# Patient Record
Sex: MALE | Race: WHITE | NOT HISPANIC OR LATINO | Employment: OTHER | URBAN - METROPOLITAN AREA
[De-identification: names, ages, dates, MRNs, and addresses within clinical notes are randomized per-mention and may not be internally consistent; named-entity substitution may affect disease eponyms.]

---

## 2017-01-04 ENCOUNTER — APPOINTMENT (OUTPATIENT)
Dept: PREADMISSION TESTING | Facility: HOSPITAL | Age: 77
End: 2017-01-04

## 2017-01-04 DIAGNOSIS — R00.1 BRADYCARDIA: ICD-10-CM

## 2017-01-04 LAB
ANION GAP SERPL CALCULATED.3IONS-SCNC: 10 MMOL/L (ref 3–11)
BASOPHILS # BLD AUTO: 0.02 10*3/MM3 (ref 0–0.2)
BASOPHILS NFR BLD AUTO: 0.3 % (ref 0–1)
BUN BLD-MCNC: 19 MG/DL (ref 9–23)
BUN/CREAT SERPL: 11.9 (ref 7–25)
CALCIUM SPEC-SCNC: 11.2 MG/DL (ref 8.7–10.4)
CHLORIDE SERPL-SCNC: 102 MMOL/L (ref 99–109)
CO2 SERPL-SCNC: 26 MMOL/L (ref 20–31)
CREAT BLD-MCNC: 1.6 MG/DL (ref 0.6–1.3)
DEPRECATED RDW RBC AUTO: 42.3 FL (ref 37–54)
EOSINOPHIL # BLD AUTO: 0.21 10*3/MM3 (ref 0.1–0.3)
EOSINOPHIL NFR BLD AUTO: 2.9 % (ref 0–3)
ERYTHROCYTE [DISTWIDTH] IN BLOOD BY AUTOMATED COUNT: 13 % (ref 11.3–14.5)
GFR SERPL CREATININE-BSD FRML MDRD: 42 ML/MIN/1.73
GLUCOSE BLD-MCNC: 180 MG/DL (ref 70–100)
HCT VFR BLD AUTO: 41.6 % (ref 38.9–50.9)
HGB BLD-MCNC: 14.7 G/DL (ref 13.1–17.5)
IMM GRANULOCYTES # BLD: 0.02 10*3/MM3 (ref 0–0.03)
IMM GRANULOCYTES NFR BLD: 0.3 % (ref 0–0.6)
INR PPP: 1.04
LYMPHOCYTES # BLD AUTO: 1.58 10*3/MM3 (ref 0.6–4.8)
LYMPHOCYTES NFR BLD AUTO: 21.8 % (ref 24–44)
MCH RBC QN AUTO: 31.7 PG (ref 27–31)
MCHC RBC AUTO-ENTMCNC: 35.3 G/DL (ref 32–36)
MCV RBC AUTO: 89.8 FL (ref 80–99)
MONOCYTES # BLD AUTO: 0.5 10*3/MM3 (ref 0–1)
MONOCYTES NFR BLD AUTO: 6.9 % (ref 0–12)
NEUTROPHILS # BLD AUTO: 4.91 10*3/MM3 (ref 1.5–8.3)
NEUTROPHILS NFR BLD AUTO: 67.8 % (ref 41–71)
PLATELET # BLD AUTO: 277 10*3/MM3 (ref 150–450)
PMV BLD AUTO: 11 FL (ref 6–12)
POTASSIUM BLD-SCNC: 4 MMOL/L (ref 3.5–5.5)
PROTHROMBIN TIME: 11.3 SECONDS (ref 9.6–11.5)
RBC # BLD AUTO: 4.63 10*6/MM3 (ref 4.2–5.76)
SODIUM BLD-SCNC: 138 MMOL/L (ref 132–146)
WBC NRBC COR # BLD: 7.24 10*3/MM3 (ref 3.5–10.8)

## 2017-01-04 PROCEDURE — 80048 BASIC METABOLIC PNL TOTAL CA: CPT | Performed by: PHYSICIAN ASSISTANT

## 2017-01-04 PROCEDURE — 85610 PROTHROMBIN TIME: CPT | Performed by: PHYSICIAN ASSISTANT

## 2017-01-04 PROCEDURE — 85025 COMPLETE CBC W/AUTO DIFF WBC: CPT | Performed by: PHYSICIAN ASSISTANT

## 2017-01-04 PROCEDURE — 36415 COLL VENOUS BLD VENIPUNCTURE: CPT

## 2017-01-04 RX ORDER — NITROGLYCERIN 0.4 MG/1
0.4 TABLET SUBLINGUAL
COMMUNITY
End: 2021-06-23 | Stop reason: SDUPTHER

## 2017-01-04 RX ORDER — MECLIZINE HCL 25MG 25 MG/1
25 TABLET, CHEWABLE ORAL 3 TIMES DAILY PRN
COMMUNITY
End: 2017-10-11

## 2017-01-04 NOTE — DISCHARGE INSTRUCTIONS
The following instructions given during Pre Admission Testing visit:    Do not eat or drink anything after MN except for sips of water with your a.m. Prescription meds unless otherwise instructed by your physician.    Glasses and jewelry may be worn, but dentures must be removed prior to cath/procedure.    Leave any items you consider valuable at home.    Family members may wait in [facility] waiting area during procedure.    Bring all medications in their original containers the day of procedure.    Bring photo ID and insurance cards on the day of procedure.    Need to make arrangements for transportation prior to discharge.    The following handouts were given:     Heart Cath pathway (if applicable)   Cardiac Cath booklet published by [facility]    OR appropriate [facility] procedure booklet    If applicable, pt instructed to bring CPAP mask and tubing the day of procedure. WIPES GIVEN.

## 2017-01-05 PROBLEM — R00.1 SYMPTOMATIC SINUS BRADYCARDIA: Status: ACTIVE | Noted: 2017-01-05

## 2017-01-05 PROBLEM — I45.89 CHRONOTROPIC INCOMPETENCE: Status: ACTIVE | Noted: 2017-01-05

## 2017-01-06 ENCOUNTER — APPOINTMENT (OUTPATIENT)
Dept: GENERAL RADIOLOGY | Facility: HOSPITAL | Age: 77
End: 2017-01-06

## 2017-01-06 PROCEDURE — 71020 HC CHEST PA AND LATERAL: CPT

## 2017-01-16 ENCOUNTER — OFFICE VISIT (OUTPATIENT)
Dept: CARDIOLOGY | Facility: CLINIC | Age: 77
End: 2017-01-16

## 2017-01-16 DIAGNOSIS — R00.1 SYMPTOMATIC BRADYCARDIA: Primary | ICD-10-CM

## 2017-01-16 PROCEDURE — 99024 POSTOP FOLLOW-UP VISIT: CPT | Performed by: INTERNAL MEDICINE

## 2017-01-16 NOTE — PROGRESS NOTES
WOUND CHECK    2017    Javier Lerner, : 1940    WOUND CHECK    B/P:  (Sitting)   164/82  LEFT ARM  (Standing)     Pulse: 79    Patient has fever: [] Temperature if indicated: 97.4    Wound Location: LEFT INFRACLAVICULAR    Dressing Removed [x]        Old Dressing Appearance:  Clean, dry []                 Old, bloody drainage [x]   MINIMAL                          Moist, serous drainage []                Moist, thick yellow/green drainage []       Wound Appearance: Redness []                  Drainage []                  Culture obtained []        Color: N/A     Consistency: N/A     Amount: none         Gloves used, wound cleansed with sterile 4x4 and peroxide [x]       MD notified [] MD orders:   Antibiotic started []  If checked, type   Other:     Appointment for follow-up scheduled for 3 months post procedure [x]    Future Appointments  Date Time Provider Department Center   2017 1:00 PM MD AMANUEL Rodriguez [facility] None   2017 11:45 AM MD AMANUEL Tamayo [facility] None           Afshan Walden MA, 17      MD Signature:______________________________ Completed By/Date:

## 2017-01-25 ENCOUNTER — OFFICE VISIT (OUTPATIENT)
Dept: CARDIOLOGY | Facility: CLINIC | Age: 77
End: 2017-01-25

## 2017-01-25 VITALS
BODY MASS INDEX: 37.1 KG/M2 | WEIGHT: 265 LBS | DIASTOLIC BLOOD PRESSURE: 68 MMHG | SYSTOLIC BLOOD PRESSURE: 120 MMHG | HEART RATE: 75 BPM | HEIGHT: 71 IN

## 2017-01-25 DIAGNOSIS — E11.9 TYPE 2 DIABETES MELLITUS WITHOUT COMPLICATION, WITH LONG-TERM CURRENT USE OF INSULIN (HCC): ICD-10-CM

## 2017-01-25 DIAGNOSIS — I25.10 CORONARY ARTERY DISEASE INVOLVING NATIVE CORONARY ARTERY OF NATIVE HEART WITHOUT ANGINA PECTORIS: Primary | ICD-10-CM

## 2017-01-25 DIAGNOSIS — Z79.4 TYPE 2 DIABETES MELLITUS WITHOUT COMPLICATION, WITH LONG-TERM CURRENT USE OF INSULIN (HCC): ICD-10-CM

## 2017-01-25 DIAGNOSIS — E78.2 MIXED HYPERLIPIDEMIA: ICD-10-CM

## 2017-01-25 PROCEDURE — 99213 OFFICE O/P EST LOW 20 MIN: CPT | Performed by: INTERNAL MEDICINE

## 2017-01-25 NOTE — PROGRESS NOTES
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    Chief Complaint   Patient presents with   • Follow-up     HTN       PROBLEM LIST:  1. Symptomatic bradycardia  A. Carolyn: holter avg rate mid 40's and planned PPM in January  B. Echo 10/16: LVEF >70, Anatomically and functionally normal valves  C. MPS 10/16: normal perfusion with no evidence of ischemia, EF 67%.  D. BTK ppm 1/17- abh lower rate 70  2. Hypertension  3. Chronic fatigue  4. CKD, stage 3, GFR 30-59 1/17 creat 1.7  5. Obesity  6. DMT2  7. OMAIRA with CPAP  8. Tobacco use - cessation 3 years ago       Allergies  Allergies   Allergen Reactions   • Penicillins Other (See Comments)     WELTS ON HANDS AND TURN PURPLE   • Tetracyclines & Related      Blisters on skin       Current Medications    Current Outpatient Prescriptions:   •  amLODIPine (NORVASC) 10 MG tablet, Take 10 mg by mouth Every Morning., Disp: , Rfl:   •  aspirin 81 MG EC tablet, Take 81 mg by mouth Every Night., Disp: , Rfl:   •  doxazosin (CARDURA) 4 MG tablet, Take 4 mg by mouth Every Night., Disp: , Rfl:   •  fenofibrate 160 MG tablet, Take 160 mg by mouth Every Morning., Disp: , Rfl:   •  Insulin Glargine (TOUJEO SOLOSTAR) 300 UNIT/ML solution pen-injector, Inject 55 Units under the skin 2 (Two) Times a Day., Disp: , Rfl:   •  JANUVIA 50 MG tablet, Take 50 mg by mouth Every Morning., Disp: , Rfl:   •  lisinopril (PRINIVIL,ZESTRIL) 30 MG tablet, Take 30 mg by mouth every night., Disp: , Rfl:   •  lisinopril-hydrochlorothiazide (PRINZIDE,ZESTORETIC) 20-12.5 MG per tablet, Take 1 tablet by mouth every morning., Disp: , Rfl:   •  meclizine 25 MG chewable tablet chewable tablet, Chew 25 mg 3 (Three) Times a Day As Needed., Disp: , Rfl:   •  metFORMIN  "(GLUCOPHAGE) 1000 MG tablet, Take 1,000 mg by mouth 2 (Two) Times a Day With Meals., Disp: , Rfl:   •  nitroglycerin (NITROSTAT) 0.4 MG SL tablet, Place 0.4 mg under the tongue Every 5 (Five) Minutes As Needed for chest pain. Take no more than 3 doses in 15 minutes., Disp: , Rfl:   •  simvastatin (ZOCOR) 20 MG tablet, Take 20 mg by mouth Every Night., Disp: , Rfl:       History of Present Illness     Pt denies any chest pain, dyspnea, dyspnea on exertion, orthopnea, PND, palpitations, lower extremity edema.    ROS:  All systems have been reviewed and are negative with the exception of those mentioned in the HPI.    OBJECTIVE:  Vitals:    01/25/17 1316   BP: 120/68   BP Location: Left arm   Patient Position: Sitting   Pulse: 75   Weight: 265 lb (120 kg)   Height: 71\" (180.3 cm)     Physical Exam   Constitutional: He appears well-developed and well-nourished.   Neck: Normal range of motion. Neck supple. No hepatojugular reflux and no JVD present. Carotid bruit is not present. No tracheal deviation present. No thyromegaly present.   Cardiovascular: Normal rate, regular rhythm, S1 normal, S2 normal, intact distal pulses and normal pulses.  PMI is not displaced.  Exam reveals no gallop, no distant heart sounds, no friction rub, no midsystolic click and no opening snap.    No murmur heard.  Pulses:       Radial pulses are 2+ on the right side, and 2+ on the left side.        Dorsalis pedis pulses are 2+ on the right side, and 2+ on the left side.        Posterior tibial pulses are 2+ on the right side, and 2+ on the left side.   Pulmonary/Chest: Effort normal and breath sounds normal. He has no wheezes. He has no rales.   Abdominal: Soft. Bowel sounds are normal. He exhibits no mass. There is no tenderness. There is no guarding.       Diagnostic Data:  Procedures      ASSESSMENT:   Diagnosis Plan   1. Coronary artery disease involving native coronary artery of native heart without angina pectoris     2. Type 2 diabetes " mellitus without complication, with long-term current use of insulin     3. Mixed hyperlipidemia         PLAN:  PLAN:  1. CAD No anginal equivalent and just had a normal stress test 2 months ago. Continue to monitor and risk factor reduction with weight loss with carbohydrate restriction and continued therapy with statin and aspirin.  2. Mildly Elevated pressures on home recordings, however, appropriately controlled pressures in office today. Continue to monitor   3. Hl on statin  4. DM poor dietary compliance  5. OMAIRA on niv    Shala Chaparro MD, thank you for referring Mr. Lerner for evaluation.  I have forwarded my electronically generated recommendations to you for review.  Please do not hesitate to call with any questions.      Prashant King MD, FACC

## 2017-05-16 ENCOUNTER — OFFICE VISIT (OUTPATIENT)
Dept: CARDIOLOGY | Facility: CLINIC | Age: 77
End: 2017-05-16

## 2017-05-16 VITALS
WEIGHT: 259.2 LBS | HEART RATE: 72 BPM | HEIGHT: 71 IN | SYSTOLIC BLOOD PRESSURE: 132 MMHG | BODY MASS INDEX: 36.29 KG/M2 | DIASTOLIC BLOOD PRESSURE: 60 MMHG

## 2017-05-16 DIAGNOSIS — Z95.0 PACEMAKER: ICD-10-CM

## 2017-05-16 PROCEDURE — 93280 PM DEVICE PROGR EVAL DUAL: CPT | Performed by: INTERNAL MEDICINE

## 2017-10-11 ENCOUNTER — OFFICE VISIT (OUTPATIENT)
Dept: SLEEP MEDICINE | Facility: HOSPITAL | Age: 77
End: 2017-10-11

## 2017-10-11 VITALS
HEIGHT: 71 IN | BODY MASS INDEX: 36.68 KG/M2 | DIASTOLIC BLOOD PRESSURE: 70 MMHG | SYSTOLIC BLOOD PRESSURE: 146 MMHG | OXYGEN SATURATION: 97 % | HEART RATE: 70 BPM | WEIGHT: 262 LBS

## 2017-10-11 DIAGNOSIS — G47.33 OBSTRUCTIVE SLEEP APNEA, ADULT: Primary | ICD-10-CM

## 2017-10-11 PROCEDURE — 99213 OFFICE O/P EST LOW 20 MIN: CPT | Performed by: INTERNAL MEDICINE

## 2017-10-11 NOTE — PATIENT INSTRUCTIONS
Sleep Apnea  Sleep apnea is a condition in which breathing pauses or becomes shallow during sleep. Episodes of sleep apnea usually last 10 seconds or longer, and they may occur as many as 20 times an hour. Sleep apnea disrupts your sleep and keeps your body from getting the rest that it needs. This condition can increase your risk of certain health problems, including:  · Heart attack.  · Stroke.  · Obesity.  · Diabetes.  · Heart failure.  · Irregular heartbeat.  There are three kinds of sleep apnea:  · Obstructive sleep apnea. This kind is caused by a blocked or collapsed airway.  · Central sleep apnea. This kind happens when the part of the brain that controls breathing does not send the correct signals to the muscles that control breathing.  · Mixed sleep apnea. This is a combination of obstructive and central sleep apnea.  CAUSES  The most common cause of this condition is a collapsed or blocked airway. An airway can collapse or become blocked if:  · Your throat muscles are abnormally relaxed.  · Your tongue and tonsils are larger than normal.  · You are overweight.  · Your airway is smaller than normal.  RISK FACTORS  This condition is more likely to develop in people who:  · Are overweight.  · Smoke.  · Have a smaller than normal airway.  · Are elderly.  · Are male.  · Drink alcohol.  · Take sedatives or tranquilizers.  · Have a family history of sleep apnea.  SYMPTOMS  Symptoms of this condition include:  · Trouble staying asleep.  · Daytime sleepiness and tiredness.  · Irritability.  · Loud snoring.  · Morning headaches.  · Trouble concentrating.  · Forgetfulness.  · Decreased interest in sex.  · Unexplained sleepiness.  · Mood swings.  · Personality changes.  · Feelings of depression.  · Waking up often during the night to urinate.  · Dry mouth.  · Sore throat.  DIAGNOSIS  This condition may be diagnosed with:  · A medical history.  · A physical exam.  · A series of tests that are done while you are  sleeping (sleep study). These tests are usually done in a sleep lab, but they may also be done at home.  TREATMENT  Treatment for this condition aims to restore normal breathing and to ease symptoms during sleep. It may involve managing health issues that can affect breathing, such as high blood pressure or obesity. Treatment may include:  · Sleeping on your side.  · Using a decongestant if you have nasal congestion.  · Avoiding the use of depressants, including alcohol, sedatives, and narcotics.  · Losing weight if you are overweight.  · Making changes to your diet.  · Quitting smoking.  · Using a device to open your airway while you sleep, such as:    An oral appliance. This is a custom-made mouthpiece that shifts your lower jaw forward.    A continuous positive airway pressure (CPAP) device. This device delivers oxygen to your airway through a mask.    A nasal expiratory positive airway pressure (EPAP) device. This device has valves that you put into each nostril.    A bi-level positive airway pressure (BPAP) device. This device delivers oxygen to your airway through a mask.  · Surgery if other treatments do not work. During surgery, excess tissue is removed to create a wider airway.  It is important to get treatment for sleep apnea. Without treatment, this condition can lead to:  · High blood pressure.  · Coronary artery disease.  · (Men) An inability to achieve or maintain an erection (impotence).  · Reduced thinking abilities.  HOME CARE INSTRUCTIONS  · Make any lifestyle changes that your health care provider recommends.  · Eat a healthy, well-balanced diet.  · Take over-the-counter and prescription medicines only as told by your health care provider.  · Avoid using depressants, including alcohol, sedatives, and narcotics.  · Take steps to lose weight if you are overweight.  · If you were given a device to open your airway while you sleep, use it only as told by your health care provider.  · Do not use any  tobacco products, such as cigarettes, chewing tobacco, and e-cigarettes. If you need help quitting, ask your health care provider.  · Keep all follow-up visits as told by your health care provider. This is important.  SEEK MEDICAL CARE IF:  · The device that you received to open your airway during sleep is uncomfortable or does not seem to be working.  · Your symptoms do not improve.  · Your symptoms get worse.  SEEK IMMEDIATE MEDICAL CARE IF:  · You develop chest pain.  · You develop shortness of breath.  · You develop discomfort in your back, arms, or stomach.  · You have trouble speaking.  · You have weakness on one side of your body.  · You have drooping in your face.  These symptoms may represent a serious problem that is an emergency. Do not wait to see if the symptoms will go away. Get medical help right away. Call your local emergency services (911 in the [place] Do not drive yourself to the hospital.     This information is not intended to replace advice given to you by your health care provider. Make sure you discuss any questions you have with your health care provider.     Document Released: 12/08/2003 Document Revised: 04/10/2017 Document Reviewed: 09/26/2016  [employer] Interactive Patient Education ©2017 [employer]

## 2017-10-11 NOTE — PROGRESS NOTES
Subjective   Javier Lerner is a 76 y.o. male is here today for follow-up.  He is followed here with obstructive sleep apnea.  His primary care physician is Dr. Chaparro.    History of Present Illness  Patient was last seen September 20, 2016.  He has a history obstructive sleep apnea and is been on BiPAP.  He also has history of arthritis hypertension diabetes and obesity.  He denies any problems with his BiPAP machine or his mask.  He is feeling much better with his current mask.  He was having problems with bradycardia and got an pacemaker in January of this year.  He denies any other new complaints.  Past Medical History:   Diagnosis Date   • Arthritis    • Bradycardia    • Chronic fatigue 9/22/2016   • CKD (chronic kidney disease) stage 3, GFR 30-59 ml/min    • Diabetes mellitus     DX. 1997, FSBS 1 X DAY   • Enlarged prostate    • Heartburn    • Hyperlipidemia    • Hypertension    • Obesity, Class II, BMI 35-39.9, with comorbidity    • OMAIRA (obstructive sleep apnea)    • Pancreatitis     HISTORY OF   • Wears glasses        Past Surgical History:   Procedure Laterality Date   • ANKLE SURGERY Right    • APPENDECTOMY     • CARDIAC ELECTROPHYSIOLOGY PROCEDURE N/A 1/5/2017    Procedure: Pacemaker DC new;  Surgeon: Bryson Leon MD;  Location: [facility] INVASIVE LOCATION;  Service:    • CHOLECYSTECTOMY     • COLONOSCOPY     • HERNIA REPAIR      UMBILICAL    • PACEMAKER IMPLANTATION  01/05/2017    PER DR. LEON           Current Outpatient Prescriptions:   •  amLODIPine (NORVASC) 10 MG tablet, Take 10 mg by mouth Every Morning., Disp: , Rfl:   •  aspirin 81 MG EC tablet, Take 81 mg by mouth Every Night., Disp: , Rfl:   •  doxazosin (CARDURA) 4 MG tablet, Take 4 mg by mouth Every Night., Disp: , Rfl:   •  fenofibrate 160 MG tablet, Take 160 mg by mouth Every Morning., Disp: , Rfl:   •  Insulin Glargine (TOUJEO SOLOSTAR) 300 UNIT/ML solution pen-injector, Inject 55 Units under the skin 2 (Two) Times a Day.,  "Disp: , Rfl:   •  JANUVIA 50 MG tablet, Take 50 mg by mouth Every Morning., Disp: , Rfl:   •  lisinopril (PRINIVIL,ZESTRIL) 30 MG tablet, Take 30 mg by mouth every night., Disp: , Rfl:   •  lisinopril-hydrochlorothiazide (PRINZIDE,ZESTORETIC) 20-12.5 MG per tablet, Take 1 tablet by mouth every morning., Disp: , Rfl:   •  metFORMIN (GLUCOPHAGE) 1000 MG tablet, Take 1,000 mg by mouth 2 (Two) Times a Day With Meals., Disp: , Rfl:   •  simvastatin (ZOCOR) 20 MG tablet, Take 20 mg by mouth Every Night., Disp: , Rfl:   •  nitroglycerin (NITROSTAT) 0.4 MG SL tablet, Place 0.4 mg under the tongue Every 5 (Five) Minutes As Needed for chest pain. Take no more than 3 doses in 15 minutes., Disp: , Rfl:     Allergies   Allergen Reactions   • Penicillins Other (See Comments)     WELTS ON HANDS AND TURN PURPLE   • Tetracyclines & Related      Blisters on skin       The following portions of the patient's history were reviewed and updated as appropriate: allergies, current medications and problem list.    Review of Systems   Constitutional: Positive for diaphoresis.   HENT: Positive for congestion, postnasal drip and sinus pressure.    Eyes: Positive for visual disturbance.   Respiratory: Positive for shortness of breath.    Cardiovascular: Negative.    Gastrointestinal: Negative.    Endocrine: Positive for polydipsia and polyuria.   Genitourinary: Negative.    Musculoskeletal: Positive for arthralgias and joint swelling.   Skin: Negative.    Allergic/Immunologic: Positive for environmental allergies.   Neurological: Negative.    Psychiatric/Behavioral: The patient is nervous/anxious.    [place] scores 3/24    Objective     /70  Pulse 70  Ht 71\" (180.3 cm)  Wt 262 lb (119 kg)  SpO2 97%  BMI 36.54 kg/m2    Physical Exam   Constitutional: He is oriented to person, place, and time. He appears well-developed and well-nourished.   He is obese.   HENT:   Head: Normocephalic and atraumatic.   He has Mallampati class II anatomy. "   Eyes: EOM are normal. Pupils are equal, round, and reactive to light.   Neck: Normal range of motion. Neck supple.   Cardiovascular: Normal rate, regular rhythm and normal heart sounds.    Pulmonary/Chest: Effort normal and breath sounds normal.   Abdominal: Soft. Bowel sounds are normal.   Musculoskeletal: Normal range of motion. He exhibits no edema.   Neurological: He is alert and oriented to person, place, and time.   Skin: Skin is warm and dry.   Psychiatric: He has a normal mood and affect. His behavior is normal.    download from his machine shows excellent compliance.  Over the past 6 months he's used it every day.  He using 8 hours 11 minutes per day.  His AHI is normal at 0.6.  He has an inspiratory pressure of 15 expiratory pressure of 10      Assessment/Plan   Javier was seen today for follow-up.    Diagnoses and all orders for this visit:    Obstructive sleep apnea, adult  -     CPAP Therapy      Patient seems doing very well with his BiPAP machine.  He has good control of his respiratory events on his current pressures.  We will continue on these pressures.  We will renew his supplies.  We'll plan to see him back in 1 year.  He is encouraged to lose weight.  He is encouraged to avoid alcohol and sedatives close to bedtime.  He is encouraged practice lateral position sleep.  He is contact us earlier symptoms worsen.           Dash Tony MD [facility]  Sleep Medicine  Pulmonary and Critical Care Medicine      10/11/17  1:52 PM

## 2017-10-25 ENCOUNTER — OFFICE VISIT (OUTPATIENT)
Dept: CARDIOLOGY | Facility: CLINIC | Age: 77
End: 2017-10-25

## 2017-10-25 VITALS
DIASTOLIC BLOOD PRESSURE: 70 MMHG | HEART RATE: 70 BPM | WEIGHT: 265.2 LBS | BODY MASS INDEX: 37.13 KG/M2 | SYSTOLIC BLOOD PRESSURE: 146 MMHG | HEIGHT: 71 IN

## 2017-10-25 DIAGNOSIS — I25.10 CORONARY ARTERY DISEASE INVOLVING NATIVE CORONARY ARTERY OF NATIVE HEART WITHOUT ANGINA PECTORIS: Primary | ICD-10-CM

## 2017-10-25 DIAGNOSIS — Z79.4 TYPE 2 DIABETES MELLITUS WITH HYPERGLYCEMIA, WITH LONG-TERM CURRENT USE OF INSULIN (HCC): ICD-10-CM

## 2017-10-25 DIAGNOSIS — E78.2 MIXED HYPERLIPIDEMIA: ICD-10-CM

## 2017-10-25 DIAGNOSIS — E11.65 TYPE 2 DIABETES MELLITUS WITH HYPERGLYCEMIA, WITH LONG-TERM CURRENT USE OF INSULIN (HCC): ICD-10-CM

## 2017-10-25 DIAGNOSIS — I10 ESSENTIAL HYPERTENSION: ICD-10-CM

## 2017-10-25 PROCEDURE — 99214 OFFICE O/P EST MOD 30 MIN: CPT | Performed by: INTERNAL MEDICINE

## 2017-12-01 ENCOUNTER — OFFICE VISIT (OUTPATIENT)
Dept: CARDIOLOGY | Facility: CLINIC | Age: 77
End: 2017-12-01

## 2017-12-01 VITALS
BODY MASS INDEX: 36.68 KG/M2 | SYSTOLIC BLOOD PRESSURE: 158 MMHG | HEART RATE: 80 BPM | DIASTOLIC BLOOD PRESSURE: 64 MMHG | OXYGEN SATURATION: 97 % | HEIGHT: 71 IN | WEIGHT: 262 LBS

## 2017-12-01 DIAGNOSIS — R00.1 BRADYCARDIA: ICD-10-CM

## 2017-12-01 DIAGNOSIS — R00.1 SEVERE SINUS BRADYCARDIA: Primary | ICD-10-CM

## 2017-12-01 DIAGNOSIS — I45.89 CHRONOTROPIC INCOMPETENCE: ICD-10-CM

## 2017-12-01 PROCEDURE — 99214 OFFICE O/P EST MOD 30 MIN: CPT | Performed by: CLINICAL NURSE SPECIALIST

## 2017-12-01 PROCEDURE — 93288 INTERROG EVL PM/LDLS PM IP: CPT | Performed by: CLINICAL NURSE SPECIALIST

## 2017-12-01 NOTE — PROGRESS NOTES
[facility]  Electrophysiology Office Visit    Javier Lerner  1940  [phone]      12/01/2017    Location:    Shala Chaparro MD  [address] [place] [id number]    Chief Complaint/ Reason For Visit:   Chief Complaint   Patient presents with   • Slow Heart Rate       Problem List:  1. Symptomatic bradycardia   A. Carolyn: holter avg rate mid 40's and planned PPM in January   B. Echocardiogram 10/16: LVEF >70, Anatomically and functionally normal valves   C. MPS 10/16: normal perfusion with no evidence of ischemia, EF 67%.   D. BTK PPM 01/17- lower rate 70  2. Hypertension  3. Chronic fatigue  4. CKD, stage 3, GFR 30-59 1/17 creat 1.7  5. Obesity  6. DMT2  7. OMAIRA with CPAP  8. Tobacco use - cessation 3 years ago     Allergies  Allergies   Allergen Reactions   • Penicillins Other (See Comments)     WELTS ON HANDS AND TURN PURPLE   • Tetracyclines & Related      Blisters on skin       Current Medications    Current Outpatient Prescriptions:   •  amLODIPine (NORVASC) 10 MG tablet, Take 10 mg by mouth Every Morning., Disp: , Rfl:   •  aspirin 81 MG EC tablet, Take 81 mg by mouth Every Night., Disp: , Rfl:   •  doxazosin (CARDURA) 4 MG tablet, Take 4 mg by mouth Every Night., Disp: , Rfl:   •  fenofibrate 160 MG tablet, Take 160 mg by mouth Every Morning., Disp: , Rfl:   •  Insulin Glargine (TOUJEO SOLOSTAR) 300 UNIT/ML solution pen-injector, Inject  under the skin 2 (Two) Times a Day., Disp: , Rfl:   •  insulin lispro (humaLOG) 100 UNIT/ML injection, Inject  under the skin Daily., Disp: , Rfl:   •  lisinopril (PRINIVIL,ZESTRIL) 30 MG tablet, Take 30 mg by mouth every night., Disp: , Rfl:   •  lisinopril-hydrochlorothiazide (PRINZIDE,ZESTORETIC) 20-12.5 MG per tablet, Take 1 tablet by mouth every morning., Disp: , Rfl:   •  metFORMIN (GLUCOPHAGE) 1000 MG tablet, Take 1,000 mg by mouth 2 (Two) Times a Day With Meals., Disp: , Rfl:   •  nitroglycerin  "(NITROSTAT) 0.4 MG SL tablet, Place 0.4 mg under the tongue Every 5 (Five) Minutes As Needed for chest pain. Take no more than 3 doses in 15 minutes., Disp: , Rfl:   •  simvastatin (ZOCOR) 20 MG tablet, Take 20 mg by mouth Every Night., Disp: , Rfl:   •  SITagliptin (JANUVIA) 50 MG tablet, Take 50 mg by mouth Daily., Disp: , Rfl:     History of Present Illness   HPI    Patient presents for follow up of symptomatic sinus bradycardia s/p PPM implant.  Since the last visit, the patient denies any SOB, chest pain, near syncope or syncope. He has occasional palpitations, occasional dizziness and chronic fatigue. He has dizziness primarily upon 1st standing. Denies any hospitalizations, ER visits, bleeding, or TIA/CVA symptoms. Overall feels well.  He is looking forward to his upcoming one-[place] vacation to [place].    ROS:  General:  Positive for fatigue.  Denies weight gain or loss  Cardiovascular:  Positive for dizziness, palpitations.  Denies CP, PND, syncope, near syncope, edema.  Pulmonary:  Denies HERNÁNDEZ, cough, or wheezing  All other review of systems have been completed and are negative    Physical Exam:  Vitals:    12/01/17 1131   Weight: 262 lb (119 kg)   Height: 71\" (180.3 cm)     General: NAD  Neck: no JVD, no carotid bruits, no TM  Heart RRR, NL S1, S2, S4 present, no rubs, murmurs  Lungs: CTA, no wheezes, rhonchi, or rales  Abd: soft, non-tender, NL BS  Ext: No musculoskeletal deformities, 1+ bilateral lower extremity edema, no cyanosis or clubbing  Psych: normal mood and affect    Diagnostic Data:  Procedures  Device interrogation: Biotronik dual-chamber pacemaker with a lower rate interval of 70 bpm and upper tracking rate of 130 bpm.  Pacing in the right atrium is 92% in the right ventricle is 1%.  P waves are 2.9 mV and R waves are 9.0 mV.  Threshold in the right atrium is 1.2 V at 0.4 ms and in the right ventricle is 1.1 V at 0.4 ms.  Impedance in the right atrium is 448 ohms and the right ventricle " is 565 ohms.  Battery voltage is 90% which is an estimated 7 years and 7 months.  There are multiple high rate episodes since last visit including an episode of nonsustained ventricular tachycardia and multiple episodes of a regular atrial arrhythmia which primarily appears to be atrial tachycardia. Episodes last 8-26 seconds.  No changes made this interrogation.    1. Severe sinus bradycardia    2. Chronotropic incompetence    3. Bradycardia        Assessment:   1. Severe sinus bradycardia s/p Implantation of BTK DUAL-CHAMBER PPM January 2017: Device is functioning as programmed.  We're monitoring the patient remotely.  2. Atrial arrhythmia- EGM's mostly appear to demonstrate short bursts A Tach lasting 8-26 seconds.  Patient reports minimal palpitations.  3. HTN- systolic 168/ 78  4. DM II- follows with Endocrinology  5. CKD stage III- GFR 30- 59  6. OMAIRA with Bipap use  7. HLD- on Statin      Plan:   1. Continue current medical regimen. Consider addition of Metoprolol or switching Norvasc to Cardizem if tachycardia worsens. Patient is profoundly fatigued and has s/s of orthostasis, so I have not changed his regimen at this time given his short duration of tachycardia episodes and low burden.  2. ASA 81 mg daily  3. Follow up in 6 months with electrophysiology and Biotronik dual-chamber pacemaker device check    BOB Pedroza, MSN, ANP-C  Adult Nurse Practitioner  Cardiac Electrophysiology  12/1/2017  11:34 AM

## 2018-04-10 ENCOUNTER — CLINICAL SUPPORT NO REQUIREMENTS (OUTPATIENT)
Dept: CARDIOLOGY | Facility: CLINIC | Age: 78
End: 2018-04-10

## 2018-04-10 DIAGNOSIS — R00.1 BRADYCARDIA: ICD-10-CM

## 2018-04-10 PROCEDURE — 93294 REM INTERROG EVL PM/LDLS PM: CPT | Performed by: INTERNAL MEDICINE

## 2018-04-10 PROCEDURE — 93296 REM INTERROG EVL PM/IDS: CPT | Performed by: INTERNAL MEDICINE

## 2018-04-24 NOTE — PROGRESS NOTES
[facility] at [facility]  Office Progress Note  Javier Lerner  1940  [phone]      Visit Date: 4/25/2018     PCP: Shala Chaparro MD  [address] [facility] [address] [address]  [facility] [address]    IDENTIFICATION: A 77 y.o. 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  Motor homes to [facility]    Chief Complaint   Patient presents with   • Chest Pain       PROBLEM LIST:  1. Symptomatic bradycardia  A. Carolyn: holter avg rate mid 40's and planned PPM in January  B. Echo 10/16: LVEF >70, Anatomically and functionally normal valves  C. MPS 10/16: normal perfusion with no evidence of ischemia, EF 67%.  D. BTK ppm 1/17- abh lower rate 70  2. Hypertension  3. Chronic fatigue  4. CKD, stage 3, GFR 30-59 1/17 creat 1.7  5. Obesity  6. DMT2  A1c 9-7.2 2017  7. OMAIRA with CPAP  8. Tobacco use - cessation 3 years ago       Allergies  Allergies   Allergen Reactions   • Penicillins Other (See Comments)     WELTS ON HANDS AND TURN PURPLE   • Tetracyclines & Related      Blisters on skin       Current Medications    Current Outpatient Prescriptions:   •  amLODIPine (NORVASC) 10 MG tablet, Take 10 mg by mouth Every Morning., Disp: , Rfl:   •  aspirin 81 MG EC tablet, Take 81 mg by mouth Every Night., Disp: , Rfl:   •  doxazosin (CARDURA) 4 MG tablet, Take 4 mg by mouth Every Night., Disp: , Rfl:   •  fenofibrate 160 MG tablet, Take 160 mg by mouth Every Morning., Disp: , Rfl:   •  Insulin Glargine (TOUJEO SOLOSTAR) 300 UNIT/ML solution pen-injector, Inject  under the skin 2 (Two) Times a Day., Disp: , Rfl:   •  insulin lispro (humaLOG) 100 UNIT/ML injection, Inject 12 Units under the skin Daily., Disp: , Rfl:   •  linagliptin (TRADJENTA) 5 MG tablet tablet, Take 5 mg by mouth Daily., Disp: , Rfl:   •  lisinopril (PRINIVIL,ZESTRIL) 30 MG tablet, Take 30 mg by mouth every night., Disp: , Rfl:   •  lisinopril-hydrochlorothiazide (PRINZIDE,ZESTORETIC) 20-12.5 MG per tablet, Take 1 tablet by mouth  "every morning., Disp: , Rfl:   •  Magnesium 250 MG tablet, Take  by mouth., Disp: , Rfl:   •  metFORMIN (GLUCOPHAGE) 1000 MG tablet, Take 1,000 mg by mouth 2 (Two) Times a Day With Meals., Disp: , Rfl:   •  nitroglycerin (NITROSTAT) 0.4 MG SL tablet, Place 0.4 mg under the tongue Every 5 (Five) Minutes As Needed for chest pain. Take no more than 3 doses in 15 minutes., Disp: , Rfl:   •  simvastatin (ZOCOR) 20 MG tablet, Take 20 mg by mouth Every Night., Disp: , Rfl:       History of Present Illness     Pt denies n, dyspnea, dyspnea on exertion, orthopnea, PND, palpitations, milld lower extremity edema.  Patient w increased GERD and occasional chest fullness.    ROS:  All systems have been reviewed and are negative with the exception of those mentioned in the HPI.    OBJECTIVE:  Vitals:    04/25/18 1347   BP: 142/76   BP Location: Left arm   Patient Position: Sitting   Pulse: 76   Weight: 119 kg (262 lb)   Height: 180.3 cm (71\")     Physical Exam   Constitutional: He appears well-developed and well-nourished.   Neck: Normal range of motion. Neck supple. No hepatojugular reflux and no JVD present. Carotid bruit is not present. No tracheal deviation present. No thyromegaly present.   Cardiovascular: Normal rate, regular rhythm, S1 normal, S2 normal, intact distal pulses and normal pulses.  PMI is not displaced.  Exam reveals no gallop, no distant heart sounds, no friction rub, no midsystolic click and no opening snap.    No murmur heard.  Pulses:       Radial pulses are 2+ on the right side, and 2+ on the left side.        Dorsalis pedis pulses are 2+ on the right side, and 2+ on the left side.        Posterior tibial pulses are 2+ on the right side, and 2+ on the left side.   Pulmonary/Chest: Effort normal and breath sounds normal. He has no wheezes. He has no rales.   Abdominal: Soft. Bowel sounds are normal. He exhibits distension. He exhibits no mass. There is no tenderness. There is no guarding.   Musculoskeletal: " He exhibits edema.       Diagnostic Data:  Procedures      ASSESSMENT:  No diagnosis found.    PLAN:  1. CAD No anginal equivalent and just had a normal stress test 15 months ago. Continue to monitor and risk factor reduction with weight loss with carbohydrate restriction and continued therapy with statin and aspirin.  Low threshold for ischemic evaluation with any new symptoms  2. HTN Mildly Elevated pressures on home recordings Continue to monitor   3. Hl on statin  4. DM poor dietary compliance-escalation of RX recently  5. OMAIRA on niv  6.  AV block and sinus bradycardia  for his pacer to be checked at follow-up visits    Shala Chaparro MD, thank you for referring Mr. Lerner for evaluation.  I have forwarded my electronically generated recommendations to you for review.  Please do not hesitate to call with any questions.    Scribed for Prashant King MD by Yamilka Williamson PA-C. 4/25/2018  2:35 PM  I, Prashant King MD, personally performed the services described in this documentation as scribed by the above named individual in my presence, and it is both accurate and complete.  4/25/2018  2:37 PM    Prashant King MD, FACC

## 2018-04-25 ENCOUNTER — OFFICE VISIT (OUTPATIENT)
Dept: CARDIOLOGY | Facility: CLINIC | Age: 78
End: 2018-04-25

## 2018-04-25 VITALS
BODY MASS INDEX: 36.68 KG/M2 | DIASTOLIC BLOOD PRESSURE: 76 MMHG | WEIGHT: 262 LBS | SYSTOLIC BLOOD PRESSURE: 142 MMHG | HEART RATE: 76 BPM | HEIGHT: 71 IN

## 2018-04-25 DIAGNOSIS — I25.10 CORONARY ARTERY DISEASE INVOLVING NATIVE CORONARY ARTERY OF NATIVE HEART WITHOUT ANGINA PECTORIS: Primary | ICD-10-CM

## 2018-04-25 PROCEDURE — 99214 OFFICE O/P EST MOD 30 MIN: CPT | Performed by: INTERNAL MEDICINE

## 2018-04-25 RX ORDER — MULTIVITAMIN WITH IRON
1 TABLET ORAL NIGHTLY
COMMUNITY
End: 2020-09-14

## 2018-06-26 NOTE — PROGRESS NOTES
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 Symptomatic bradycardia  A. Carolyn: holter avg rate mid 40's and planned PPM in January  B. Echo 10/16: LVEF >70, Anatomically and functionally normal valves  C. MPS 10/16: normal perfusion with no evidence of ischemia, EF 67%.  D. BTK ppm 1/17- abh lower rate 70  2. Hypertension  3. Chronic fatigue  4. CKD, stage 3, GFR 30-59 1/17 creat 1.7  5. Obesity  6. DMT2  A1c 9-7.2 2017  7. OMAIRA with CPAP  8. Tobacco use - cessation 3 years ago       Allergies  Allergies   Allergen Reactions   • Penicillins Other (See Comments)     WELTS ON HANDS AND TURN PURPLE   • Tetracyclines & Related      Blisters on skin       Current Medications    Current Outpatient Prescriptions:   •  amLODIPine (NORVASC) 10 MG tablet, Take 10 mg by mouth Every Morning., Disp: , Rfl:   •  aspirin 81 MG EC tablet, Take 81 mg by mouth Every Night., Disp: , Rfl:   •  doxazosin (CARDURA) 4 MG tablet, Take 4 mg by mouth Every Night., Disp: , Rfl:   •  fenofibrate 160 MG tablet, Take 160 mg by mouth Every Morning., Disp: , Rfl:   •  Insulin Glargine (TOUJEO SOLOSTAR) 300 UNIT/ML solution pen-injector, Inject 55 Units under the skin 2 (Two) Times a Day., Disp: , Rfl:   •  JANUVIA 50 MG tablet, Take 50 mg by mouth Every Morning., Disp: , Rfl:   •  lisinopril (PRINIVIL,ZESTRIL) 30 MG tablet, Take 30 mg by mouth every night., Disp: , Rfl:   •  lisinopril-hydrochlorothiazide (PRINZIDE,ZESTORETIC) 20-12.5 MG per tablet, Take 1 tablet by mouth every morning., Disp: , Rfl:   •  metFORMIN  Note Text (......Xxx Chief Complaint.): This diagnosis correlates with the "(GLUCOPHAGE) 1000 MG tablet, Take 1,000 mg by mouth 2 (Two) Times a Day With Meals., Disp: , Rfl:   •  nitroglycerin (NITROSTAT) 0.4 MG SL tablet, Place 0.4 mg under the tongue Every 5 (Five) Minutes As Needed for chest pain. Take no more than 3 doses in 15 minutes., Disp: , Rfl:   •  simvastatin (ZOCOR) 20 MG tablet, Take 20 mg by mouth Every Night., Disp: , Rfl:       History of Present Illness     Pt denies any new  chest pain, dyspnea, dyspnea on exertion, orthopnea, PND, palpitations, milld lower extremity edema.  Patient is open to that he has had a poor diet on occasions.  He had a significant amount of watermelon intake through the summer and had A1c greater than 9.  He has cut back his starch intake and states his last A1c was in the 7 range.  He attest to no cardiac symptoms at current his residual lower extremity edema remains mild and he has occasional cramping interestingly correlates to when he takes his wife's omeprazole over-the-counter.    ROS:  All systems have been reviewed and are negative with the exception of those mentioned in the HPI.    OBJECTIVE:  Vitals:    10/25/17 1327   BP: 146/70   BP Location: Right arm   Patient Position: Sitting   Pulse: 70   Weight: 265 lb 3.2 oz (120 kg)   Height: 71\" (180.3 cm)     Physical Exam   Constitutional: He appears well-developed and well-nourished.   Neck: Normal range of motion. Neck supple. No hepatojugular reflux and no JVD present. Carotid bruit is not present. No tracheal deviation present. No thyromegaly present.   Cardiovascular: Normal rate, regular rhythm, S1 normal, S2 normal, intact distal pulses and normal pulses.  PMI is not displaced.  Exam reveals no gallop, no distant heart sounds, no friction rub, no midsystolic click and no opening snap.    No murmur heard.  Pulses:       Radial pulses are 2+ on the right side, and 2+ on the left side.        Dorsalis pedis pulses are 2+ on the right side, and 2+ on the left side.        Posterior " Detail Level: Simple Other (Free Text): used 0.3cc of her remaining juvaderm ultra for oral commissure areas. tibial pulses are 2+ on the right side, and 2+ on the left side.   Pulmonary/Chest: Effort normal and breath sounds normal. He has no wheezes. He has no rales.   Abdominal: Soft. Bowel sounds are normal. He exhibits distension. He exhibits no mass. There is no tenderness. There is no guarding.   Musculoskeletal: He exhibits edema.       Diagnostic Data:  Procedures      ASSESSMENT:   Diagnosis Plan   1. Coronary artery disease involving native coronary artery of native heart without angina pectoris     2. Type 2 diabetes mellitus with hyperglycemia, with long-term current use of insulin     3. Mixed hyperlipidemia     4. Essential hypertension         PLAN:  PLAN:  1. CAD No anginal equivalent and just had a normal stress test 2 months ago. Continue to monitor and risk factor reduction with weight loss with carbohydrate restriction and continued therapy with statin and aspirin.  Low threshold for ischemic evaluation with any new symptoms  2. I pretensionMildly Elevated pressures on home recordings, however, appropriately controlled pressures in office today. Continue to monitor   3. Hl on statin  4. DM poor dietary compliance  5. OMAIRA on niv  6.  AV block and sinus bradycardia we will arrange for his pacer to be checked at follow-up visits    Shala Chaparro MD, thank you for referring Mr. Lerner for evaluation.  I have forwarded my electronically generated recommendations to you for review.  Please do not hesitate to call with any questions.      Prashant King MD, FACC

## 2018-07-09 ENCOUNTER — OFFICE VISIT (OUTPATIENT)
Dept: CARDIOLOGY | Facility: CLINIC | Age: 78
End: 2018-07-09

## 2018-07-09 VITALS
BODY MASS INDEX: 36.54 KG/M2 | HEART RATE: 70 BPM | DIASTOLIC BLOOD PRESSURE: 74 MMHG | WEIGHT: 261 LBS | SYSTOLIC BLOOD PRESSURE: 132 MMHG | HEIGHT: 71 IN

## 2018-07-09 DIAGNOSIS — R00.1 SEVERE SINUS BRADYCARDIA: ICD-10-CM

## 2018-07-09 DIAGNOSIS — G47.33 OSA (OBSTRUCTIVE SLEEP APNEA): ICD-10-CM

## 2018-07-09 DIAGNOSIS — I10 ESSENTIAL HYPERTENSION: Primary | ICD-10-CM

## 2018-07-09 PROCEDURE — 99213 OFFICE O/P EST LOW 20 MIN: CPT | Performed by: NURSE PRACTITIONER

## 2018-07-09 PROCEDURE — 93280 PM DEVICE PROGR EVAL DUAL: CPT | Performed by: NURSE PRACTITIONER

## 2018-07-09 NOTE — PROGRESS NOTES
[facility]  Electrophysiology Office Visit    Javier Lerner  1940  [phone]    12/01/2017    Shala Chaparro MD  [address] [address]    Chief Complaint/ Reason For Visit:   Chief Complaint   Patient presents with   • Coronary Artery Disease       Problem List:  1. Symptomatic bradycardia   A. Carolyn: holter avg rate mid 40's and planned PPM in January   B. Echocardiogram 10/16: LVEF >70, Anatomically and functionally normal valves   C. MPS 10/16: normal perfusion with no evidence of ischemia, EF 67%.   D. BTK PPM 01/17- lower rate 70  2. Hypertension  3. Chronic fatigue  4. CKD, stage 3, GFR 30-59 1/17 creat 1.7  5. Obesity  6. DMT2  7. OMAIRA with CPAP  8. Tobacco use - cessation 3 years ago     Allergies  Allergies   Allergen Reactions   • Penicillins Other (See Comments)     WELTS ON HANDS AND TURN PURPLE   • Tetracyclines & Related      Blisters on skin       Current Medications    Current Outpatient Prescriptions:   •  amLODIPine (NORVASC) 10 MG tablet, Take 5 mg by mouth 2 (Two) Times a Day., Disp: , Rfl:   •  aspirin 81 MG EC tablet, Take 81 mg by mouth Every Night., Disp: , Rfl:   •  doxazosin (CARDURA) 4 MG tablet, Take 4 mg by mouth Every Night., Disp: , Rfl:   •  fenofibrate 160 MG tablet, Take 160 mg by mouth Every Morning., Disp: , Rfl:   •  Insulin Glargine (TOUJEO SOLOSTAR) 300 UNIT/ML solution pen-injector, Inject  under the skin 2 (Two) Times a Day. 62 units in the am, 66 in the pm., Disp: , Rfl:   •  insulin lispro (humaLOG) 100 UNIT/ML injection, Inject 12 Units under the skin Daily., Disp: , Rfl:   •  linagliptin (TRADJENTA) 5 MG tablet tablet, Take 5 mg by mouth Daily., Disp: , Rfl:   •  lisinopril (PRINIVIL,ZESTRIL) 30 MG tablet, Take 5 mg by mouth 2 (Two) Times a Day., Disp: , Rfl:   •  Magnesium 250 MG tablet, Take  by mouth Every Night., Disp: , Rfl:   •  metFORMIN (GLUCOPHAGE) 1000 MG tablet, Take 1,000 mg by  "mouth 2 (Two) Times a Day With Meals., Disp: , Rfl:   •  nitroglycerin (NITROSTAT) 0.4 MG SL tablet, Place 0.4 mg under the tongue Every 5 (Five) Minutes As Needed for chest pain. Take no more than 3 doses in 15 minutes., Disp: , Rfl:   •  simvastatin (ZOCOR) 20 MG tablet, Take 20 mg by mouth Every Night., Disp: , Rfl:     History of Present Illness   HPI: Mr. Lerner is a 76 y/o male with the above noted past medical history who presents for follow up visit for symptomatic sinus bradycardia, s/p PPM implant. Since the last visit, the patient denies any SOB, chest pain, near syncope or syncope. He has occasional palpitations, occasional dizziness and chronic fatigue- all of which remain unchanged. He denies any recent ED visits or hospital stays. Overall doing well. BP well controlled at home.     ROS:  General:  + for fatigue.  Denies weight gain or loss  Cardiovascular:  + for dizziness, palpitations.  Denies CP, PND, syncope, near syncope, edema.  Pulmonary:  Denies HERNÁNDEZ, cough, or wheezing  All other review of systems have been completed and are negative    Physical Exam:  Vitals:    07/09/18 1135   BP: 132/74   BP Location: Right arm   Patient Position: Sitting   Pulse: 70   Weight: 118 kg (261 lb)   Height: 180.3 cm (71\")     General: NAD  Neck: no JVD, no carotid bruits, no TM  Heart RRR, NL S1, S2, S4 present, no rubs, murmurs  Lungs: CTA, no wheezes, rhonchi, or rales  Abd: soft, non-tender, NL BS  Ext: No musculoskeletal deformities, 1+ bilateral lower extremity edema, no cyanosis or clubbing  Psych: normal mood and affect    Diagnostic Data:  Procedures  Device interrogation 7/9/18: RA 93%, RV 1%, normal threshold and impedance, 8 years left on battery, 20 mode switches (longest 54 seconds)     No diagnosis found.    Assessment:   1. Severe sinus bradycardia, s/p implantation of BTK DUAL-CHAMBER PPM January 2017: Device is functioning normally.   2. Atrial arrhythmia- EGM's mostly appear to demonstrate " short bursts A Tach lasting pu to 54 seconds.  Patient reports minimal palpitations.  3. HTN- controlled  4. OMAIRA with Bipap use      Plan:   1. Continue current medical regimen. Consider addition of Metoprolol or switching Norvasc to Cardizem if tachycardia worsens. Patient is profoundly fatigued and has s/s of orthostasis, so we have not changed his regimen in the past given this and short duration of tachycardia. Will continue ASA for now. If longer or appear to be Aflutter/Fib, will initiate full A/C.   2. Encouraged continued use of BiPAP  3. Follow up in 6 months with device check.   4. Continue to monitor BP at [facility].       [facility]  7/9/2018  11:56 AM

## 2018-09-27 ENCOUNTER — CLINICAL SUPPORT NO REQUIREMENTS (OUTPATIENT)
Dept: CARDIOLOGY | Facility: CLINIC | Age: 78
End: 2018-09-27

## 2018-09-27 DIAGNOSIS — R00.1 BRADYCARDIA: ICD-10-CM

## 2018-09-27 DIAGNOSIS — R00.1 SEVERE SINUS BRADYCARDIA: ICD-10-CM

## 2018-09-27 PROCEDURE — 93296 REM INTERROG EVL PM/IDS: CPT | Performed by: INTERNAL MEDICINE

## 2018-09-27 PROCEDURE — 93294 REM INTERROG EVL PM/LDLS PM: CPT | Performed by: INTERNAL MEDICINE

## 2019-01-11 ENCOUNTER — OFFICE VISIT (OUTPATIENT)
Dept: SLEEP MEDICINE | Facility: HOSPITAL | Age: 79
End: 2019-01-11

## 2019-01-11 VITALS
HEART RATE: 88 BPM | HEIGHT: 71 IN | BODY MASS INDEX: 37.38 KG/M2 | DIASTOLIC BLOOD PRESSURE: 68 MMHG | WEIGHT: 267 LBS | OXYGEN SATURATION: 97 % | SYSTOLIC BLOOD PRESSURE: 136 MMHG

## 2019-01-11 DIAGNOSIS — E66.01 CLASS 2 SEVERE OBESITY DUE TO EXCESS CALORIES WITH SERIOUS COMORBIDITY AND BODY MASS INDEX (BMI) OF 37.0 TO 37.9 IN ADULT (HCC): ICD-10-CM

## 2019-01-11 DIAGNOSIS — G47.33 OSA (OBSTRUCTIVE SLEEP APNEA): Primary | ICD-10-CM

## 2019-01-11 PROCEDURE — 99213 OFFICE O/P EST LOW 20 MIN: CPT | Performed by: INTERNAL MEDICINE

## 2019-01-11 NOTE — PROGRESS NOTES
Subjective   Javier Lerner is a 78 y.o. male is here today for follow-up.  He is seen follow-up of obstructive sleep apnea.  His primary care physician is Dr. Chaparro.    History of Present Illness  He states he's been doing fairly well with his machine.  He has gained weight of about 10 pounds.  He had eye surgery and it interfered with his routine.  He is also seeing a dietitian to try to help control his blood sugars in weight.  After his surgery had some problems with mask leak and had to readjust.  He's been was sleeping some in her recliner but still using his machine at times.  Past Medical History:   Diagnosis Date   • Arthritis    • Bradycardia    • Chronic fatigue 9/22/2016   • CKD (chronic kidney disease) stage 3, GFR 30-59 ml/min (CMS/[facility])    • Diabetes mellitus (CMS/[facility])     DX. 1997, FSBS 1 X DAY   • Enlarged prostate    • Heartburn    • Hyperlipidemia    • Hypertension    • Obesity, Class II, BMI 35-39.9, with comorbidity    • OMAIRA (obstructive sleep apnea)    • Pancreatitis     HISTORY OF   • Wears glasses        Past Surgical History:   Procedure Laterality Date   • ANKLE SURGERY Right    • APPENDECTOMY     • CARDIAC ELECTROPHYSIOLOGY PROCEDURE N/A 1/5/2017    Procedure: Pacemaker DC new;  Surgeon: Bryson Leon MD;  Location: [facility] INVASIVE LOCATION;  Service:    • CATARACT EXTRACTION, BILATERAL     • CHOLECYSTECTOMY     • COLONOSCOPY     • HERNIA REPAIR      UMBILICAL    • PACEMAKER IMPLANTATION  01/05/2017    PER DR. LEON           Current Outpatient Medications:   •  amLODIPine (NORVASC) 10 MG tablet, Take 5 mg by mouth 2 (Two) Times a Day., Disp: , Rfl:   •  aspirin 81 MG EC tablet, Take 81 mg by mouth Every Night., Disp: , Rfl:   •  doxazosin (CARDURA) 4 MG tablet, Take 4 mg by mouth Every Night., Disp: , Rfl:   •  fenofibrate 160 MG tablet, Take 160 mg by mouth Every Morning., Disp: , Rfl:   •  Insulin Glargine (TOUJEO SOLOSTAR) 300 UNIT/ML solution pen-injector, Inject   "under the skin 2 (Two) Times a Day. 62 units in the am, 66 in the pm., Disp: , Rfl:   •  insulin lispro (humaLOG) 100 UNIT/ML injection, Inject 12 Units under the skin Daily., Disp: , Rfl:   •  linagliptin (TRADJENTA) 5 MG tablet tablet, Take 5 mg by mouth Daily., Disp: , Rfl:   •  lisinopril (PRINIVIL,ZESTRIL) 30 MG tablet, Take 5 mg by mouth 2 (Two) Times a Day., Disp: , Rfl:   •  Magnesium 250 MG tablet, Take  by mouth Every Night., Disp: , Rfl:   •  metFORMIN (GLUCOPHAGE) 1000 MG tablet, Take 1,000 mg by mouth 2 (Two) Times a Day With Meals., Disp: , Rfl:   •  nitroglycerin (NITROSTAT) 0.4 MG SL tablet, Place 0.4 mg under the tongue Every 5 (Five) Minutes As Needed for chest pain. Take no more than 3 doses in 15 minutes., Disp: , Rfl:   •  simvastatin (ZOCOR) 20 MG tablet, Take 20 mg by mouth Every Night., Disp: , Rfl:     Allergies   Allergen Reactions   • Penicillins Other (See Comments)     WELTS ON HANDS AND TURN PURPLE   • Tetracyclines & Related      Blisters on skin       The following portions of the patient's history were reviewed and updated as appropriate: allergies, current medications and problem list.    Review of Systems   Constitutional: Positive for diaphoresis.   HENT: Positive for congestion and postnasal drip.    Eyes: Positive for visual disturbance.        Had eye surgery   Respiratory: Positive for shortness of breath.    Cardiovascular: Positive for palpitations.   Gastrointestinal: Negative.    Endocrine: Positive for polydipsia and polyuria.   Genitourinary: Negative.    Musculoskeletal: Positive for arthralgias and joint swelling.   Skin: Negative.    Allergic/Immunologic: Negative.    Neurological: Positive for headaches.   Hematological: Negative.    Psychiatric/Behavioral: Positive for dysphoric mood. The patient is nervous/anxious.    [place] score is 3/24    Objective     /68   Pulse 88   Ht 180.3 cm (71\")   Wt 121 kg (267 lb)   SpO2 97%   BMI 37.24 kg/m²     Physical " Exam   Constitutional: He is oriented to person, place, and time. He appears well-developed and well-nourished.   He is obese.   HENT:   Head: Normocephalic and atraumatic.   He has Mallampati class to anatomy.   Eyes: EOM are normal. Pupils are equal, round, and reactive to light.   Neck: Normal range of motion. Neck supple.   Cardiovascular: Normal rate and normal heart sounds.   He has occasional ectopic beats   Pulmonary/Chest: Effort normal and breath sounds normal.   Abdominal: Soft. Bowel sounds are normal.   Musculoskeletal: Normal range of motion. He exhibits edema.   He has trace pedal edema   Neurological: He is alert and oriented to person, place, and time.   Skin: Skin is warm and dry.   Psychiatric: He has a normal mood and affect. His behavior is normal.    Download shows usage 91.1% of the time in the past 6 months.  He's averaging 6 hours 50 minutes per day.  His AHI is 1.3.  He is on an IPAP of 15 EPAP of 10      Assessment/Plan   Javier was seen today for follow-up.    Diagnoses and all orders for this visit:    OMAIRA (obstructive sleep apnea)  -     CPAP Therapy    Class 2 severe obesity due to excess calories with serious comorbidity and body mass index (BMI) of 37.0 to 37.9 in adult (CMS/[facility])    Patient seems be doing very well using his BiPAP machine.  We will continue on his current pressure settings.  We will renew his supplies.  He is to consider getting a different style mask when he gets his new equipment.  He is interested in changing his [employer] company and we will facilitate that.  We'll plan to see him back in 1 year.  He is contact us earlier symptoms worsen.  He is encouraged to lose weight.  He is encouraged to avoid alcohol and sedatives close to bedtime.  He is encouraged practice lateral position sleep.             Dash Tony MD [facility]  Sleep Medicine  Pulmonary and Critical Care Medicine      01/11/19  3:09 PM

## 2019-02-07 ENCOUNTER — OFFICE VISIT (OUTPATIENT)
Dept: CARDIOLOGY | Facility: CLINIC | Age: 79
End: 2019-02-07

## 2019-02-07 VITALS
WEIGHT: 266.2 LBS | HEART RATE: 74 BPM | OXYGEN SATURATION: 95 % | HEIGHT: 71 IN | SYSTOLIC BLOOD PRESSURE: 140 MMHG | DIASTOLIC BLOOD PRESSURE: 62 MMHG | BODY MASS INDEX: 37.27 KG/M2

## 2019-02-07 DIAGNOSIS — R00.1 BRADYCARDIA: Primary | ICD-10-CM

## 2019-02-07 DIAGNOSIS — G47.33 OSA (OBSTRUCTIVE SLEEP APNEA): ICD-10-CM

## 2019-02-07 DIAGNOSIS — I10 ESSENTIAL HYPERTENSION: ICD-10-CM

## 2019-02-07 DIAGNOSIS — I45.89 CHRONOTROPIC INCOMPETENCE: ICD-10-CM

## 2019-02-07 PROCEDURE — 93280 PM DEVICE PROGR EVAL DUAL: CPT | Performed by: NURSE PRACTITIONER

## 2019-02-07 PROCEDURE — 99213 OFFICE O/P EST LOW 20 MIN: CPT | Performed by: NURSE PRACTITIONER

## 2019-02-07 RX ORDER — LISINOPRIL AND HYDROCHLOROTHIAZIDE 20; 12.5 MG/1; MG/1
2 TABLET ORAL DAILY
COMMUNITY
Start: 2018-12-04 | End: 2020-09-14

## 2019-02-07 NOTE — PROGRESS NOTES
[facility] Cardiology Services  Electrophysiology Office Visit    Javier Lerner  1940  [phone]    12/01/2017    Shala Chaparro MD  [address] [address]    Chief Complaint/ Reason For Visit:   Chief Complaint   Patient presents with   • Coronary Artery Disease       Problem List:  1. Symptomatic bradycardia   A. Carolyn: holter avg rate mid 40's and planned PPM in January   B. Echocardiogram 10/16: LVEF >70, Anatomically and functionally normal valves   C. MPS 10/16: normal perfusion with no evidence of ischemia, EF 67%.   D. BTK PPM 01/17- lower rate 70  2. Hypertension  3. Chronic fatigue  4. CKD, stage 3, GFR 30-59 1/17 creat 1.7  5. Obesity  6. DMT2  7. OMAIRA with CPAP  8. Tobacco use - cessation 3 years ago     Allergies  Allergies   Allergen Reactions   • Penicillins Other (See Comments)     WELTS ON HANDS AND TURN PURPLE   • Tetracyclines & Related      Blisters on skin       Current Medications    Current Outpatient Medications:   •  amLODIPine (NORVASC) 10 MG tablet, Take 5 mg by mouth 2 (Two) Times a Day., Disp: , Rfl:   •  aspirin 81 MG EC tablet, Take 81 mg by mouth Every Night., Disp: , Rfl:   •  doxazosin (CARDURA) 4 MG tablet, Take 4 mg by mouth Every Night., Disp: , Rfl:   •  fenofibrate 160 MG tablet, Take 160 mg by mouth Every Morning., Disp: , Rfl:   •  Insulin Glargine (TOUJEO SOLOSTAR) 300 UNIT/ML solution pen-injector, Inject  under the skin into the appropriate area as directed 2 (Two) Times a Day. 62 units in the am, 74 in the pm., Disp: , Rfl:   •  insulin lispro (humaLOG) 100 UNIT/ML injection, Inject 12 Units under the skin Daily., Disp: , Rfl:   •  linagliptin (TRADJENTA) 5 MG tablet tablet, Take 5 mg by mouth Daily., Disp: , Rfl:   •  lisinopril (PRINIVIL,ZESTRIL) 30 MG tablet, Take 30 mg by mouth Daily., Disp: , Rfl:   •  lisinopril-hydrochlorothiazide (PRINZIDE,ZESTORETIC) 20-12.5 MG per tablet, Take 2 tablets by mouth  "Daily., Disp: , Rfl:   •  Magnesium 250 MG tablet, Take  by mouth Every Night., Disp: , Rfl:   •  metFORMIN (GLUCOPHAGE) 1000 MG tablet, Take 1,000 mg by mouth 2 (Two) Times a Day With Meals., Disp: , Rfl:   •  nitroglycerin (NITROSTAT) 0.4 MG SL tablet, Place 0.4 mg under the tongue Every 5 (Five) Minutes As Needed for chest pain. Take no more than 3 doses in 15 minutes., Disp: , Rfl:   •  simvastatin (ZOCOR) 20 MG tablet, Take 20 mg by mouth Every Night., Disp: , Rfl:     History of Present Illness   Coronary Artery Disease     HPI: Mr. Lerner is a 77 y/o male with the above noted past medical history who presents for follow up visit for symptomatic sinus bradycardia, s/p PPM implant. Since the last visit, the patient denies any SOB, chest pain, near syncope or syncope. He denies palpitations and chronic fatigue- all of which remain unchanged. He denies any recent ED visits or hospital stays.  BP well controlled at home. Sugar is poorly controlled in spite of dietary changes.     ROS:  General:  + for fatigue.  Denies weight gain or loss  Cardiovascular:  + for dizziness, palpitations.  Denies CP, PND, syncope, near syncope, edema.  Pulmonary:  Denies HERNÁNDEZ, cough, or wheezing  All other review of systems have been completed and are negative    Physical Exam:  Vitals:    02/07/19 0946   BP: 140/62   BP Location: Left arm   Patient Position: Sitting   Pulse: 74   SpO2: 95%   Weight: 121 kg (266 lb 3.2 oz)   Height: 180.3 cm (71\")     Physical Exam:  General: NAD  Neck: no JVD, no carotid bruits, no TM  Heart RRR, NL S1, S2, S4 present, no rubs, murmurs  Lungs: CTA, no wheezes, rhonchi, or rales  Abd: soft, non-tender, NL BS  Ext: No musculoskeletal deformities, 1+ bilateral lower extremity edema, no cyanosis or clubbing  Psych: normal mood and affect    Diagnostic Data:  Procedures  Device interrogation 2/7/19: RA 91%, 2%, normal threshold and impedance, 6 sec of NSVT (asymptomatic), 8 hours total of afib, longest " of 30 minutes on 1/5.     1. Bradycardia    2. Chronotropic incompetence    3. OMAIRA (obstructive sleep apnea)    4. Essential hypertension        Assessment:   1. Severe sinus bradycardia, s/p DDD PPM implant with normal function today.    2. Atrial arrhythmia- appears to have had several episodes of Afib, longest of 30 minutes, total of 8 hours. CHADSVASC = 4.  3. HTN- controlled  4. OMAIRA with Bipap use  5. CKD. Last creatinine 2017: 1.6    Plan:   1. Start Eliuquis 5 mg bid.   2. Encouraged continued use of BiPAP  3. Follow up in 6 months with device check.   4. Continue to monitor BP at home  5. Continue current meds  6. Add BB at next visit if more Afib with RVR  7. F/u 6 months      Electronically signed by BOB Walsh, 02/07/19, 10:13 AM.

## 2019-02-15 ENCOUNTER — CLINICAL SUPPORT NO REQUIREMENTS (OUTPATIENT)
Dept: CARDIOLOGY | Facility: CLINIC | Age: 79
End: 2019-02-15

## 2019-02-15 DIAGNOSIS — R00.1 BRADYCARDIA: ICD-10-CM

## 2019-02-15 PROCEDURE — 93294 REM INTERROG EVL PM/LDLS PM: CPT | Performed by: INTERNAL MEDICINE

## 2019-02-15 PROCEDURE — 93296 REM INTERROG EVL PM/IDS: CPT | Performed by: INTERNAL MEDICINE

## 2019-02-27 ENCOUNTER — OFFICE VISIT (OUTPATIENT)
Dept: CARDIOLOGY | Facility: CLINIC | Age: 79
End: 2019-02-27

## 2019-02-27 VITALS
WEIGHT: 263 LBS | HEIGHT: 71 IN | BODY MASS INDEX: 36.82 KG/M2 | DIASTOLIC BLOOD PRESSURE: 64 MMHG | SYSTOLIC BLOOD PRESSURE: 144 MMHG | HEART RATE: 73 BPM

## 2019-02-27 DIAGNOSIS — I10 ESSENTIAL HYPERTENSION: ICD-10-CM

## 2019-02-27 DIAGNOSIS — E78.2 MIXED HYPERLIPIDEMIA: ICD-10-CM

## 2019-02-27 DIAGNOSIS — I25.10 CORONARY ARTERY DISEASE INVOLVING NATIVE CORONARY ARTERY OF NATIVE HEART WITHOUT ANGINA PECTORIS: Primary | ICD-10-CM

## 2019-02-27 DIAGNOSIS — I44.30 AV BLOCK: ICD-10-CM

## 2019-02-27 DIAGNOSIS — E11.65 TYPE 2 DIABETES MELLITUS WITH HYPERGLYCEMIA, WITHOUT LONG-TERM CURRENT USE OF INSULIN (HCC): ICD-10-CM

## 2019-02-27 PROCEDURE — 99213 OFFICE O/P EST LOW 20 MIN: CPT | Performed by: INTERNAL MEDICINE

## 2019-02-27 NOTE — PROGRESS NOTES
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  Motor homes to [facility]    Chief Complaint   Patient presents with   • Coronary Artery Disease       PROBLEM LIST:  1. Symptomatic bradycardia  [facility] 40's and planned PPM in January  2. Echo 10/16: LVEF >70, Anatomically and functionally normal valves  3. MPS 10/16: normal perfusion with no evidence of ischemia, EF 67%.  4. BTK ppm 1/17- abh lower rate 70  2. Hypertension  3. Chronic fatigue  4. CKD, stage 3, GFR 30-59 1/17 creat 1.7  5. Obesity  6. DMT2  7. A1c 9-7.2 2017  8. OMAIRA with CPAP  9. Tobacco use - cessation 3 years ago  10. PAF  1. LFPFP4ATSm 4, a/c w Xarelto     Allergies  Allergies   Allergen Reactions   • Penicillins Other (See Comments)     WELTS ON HANDS AND TURN PURPLE   • Tetracyclines & Related      Blisters on skin       Current Medications    Current Outpatient Medications:   •  amLODIPine (NORVASC) 10 MG tablet, Take 5 mg by mouth 2 (Two) Times a Day., Disp: , Rfl:   •  apixaban (ELIQUIS) 5 MG tablet tablet, Take 1 tablet by mouth Every 12 (Twelve) Hours., Disp: 60 tablet, Rfl: 11  •  doxazosin (CARDURA) 4 MG tablet, Take 4 mg by mouth Every Night., Disp: , Rfl:   •  fenofibrate 160 MG tablet, Take 160 mg by mouth Every Morning., Disp: , Rfl:   •  Insulin Glargine (TOUJEO SOLOSTAR) 300 UNIT/ML solution pen-injector, Inject  under the skin into the appropriate area as directed 2 (Two) Times a Day. 62 units in the am, 74 in the pm., Disp: , Rfl:   •  insulin lispro (humaLOG) 100 UNIT/ML injection, Inject 12 Units under the skin Daily., Disp: , Rfl:   •  linagliptin (TRADJENTA) 5 MG tablet tablet, Take 5 mg by mouth Daily., Disp: , Rfl:   •  lisinopril  "(PRINIVIL,ZESTRIL) 30 MG tablet, Take 30 mg by mouth Daily., Disp: , Rfl:   •  lisinopril-hydrochlorothiazide (PRINZIDE,ZESTORETIC) 20-12.5 MG per tablet, Take 2 tablets by mouth Daily., Disp: , Rfl:   •  Magnesium 250 MG tablet, Take  by mouth Every Night., Disp: , Rfl:   •  metFORMIN (GLUCOPHAGE) 1000 MG tablet, Take 1,000 mg by mouth 2 (Two) Times a Day With Meals., Disp: , Rfl:   •  nitroglycerin (NITROSTAT) 0.4 MG SL tablet, Place 0.4 mg under the tongue Every 5 (Five) Minutes As Needed for chest pain. Take no more than 3 doses in 15 minutes., Disp: , Rfl:   •  simvastatin (ZOCOR) 20 MG tablet, Take 20 mg by mouth Every Night., Disp: , Rfl:       History of Present Illness     Pt denies n, dyspnea, dyspnea on exertion, orthopnea, PND, palpitations, milld lower extremity edema.  Emotional stress as his [employer] is financially struggling.  Patient w increased GERD and occasional chest fullness.    ROS:  All systems have been reviewed and are negative with the exception of those mentioned in the HPI.    OBJECTIVE:  Vitals:    02/27/19 1402   BP: 144/64   BP Location: Left arm   Patient Position: Sitting   Pulse: 73   Weight: 119 kg (263 lb)   Height: 180.3 cm (71\")     Physical Exam   Constitutional: He appears well-developed and well-nourished.   Neck: Normal range of motion. Neck supple. No hepatojugular reflux and no JVD present. Carotid bruit is not present. No tracheal deviation present. No thyromegaly present.   Cardiovascular: Normal rate, regular rhythm, S1 normal, S2 normal, intact distal pulses and normal pulses. PMI is not displaced. Exam reveals no gallop, no distant heart sounds, no friction rub, no midsystolic click and no opening snap.   No murmur heard.  Pulses:       Radial pulses are 2+ on the right side, and 2+ on the left side.        Dorsalis pedis pulses are 2+ on the right side, and 2+ on the left side.        Posterior tibial pulses are 2+ on the right side, and 2+ on the left side. "   Pulmonary/Chest: Effort normal and breath sounds normal. He has no wheezes. He has no rales.   Abdominal: Soft. Bowel sounds are normal. He exhibits distension. He exhibits no mass. There is no tenderness. There is no guarding.   Musculoskeletal: He exhibits edema.       Diagnostic Data:  Procedures      ASSESSMENT:   Diagnosis Plan   1. Coronary artery disease involving native coronary artery of native heart without angina pectoris     2. Essential hypertension     3. Mixed hyperlipidemia     4. Type 2 diabetes mellitus with hyperglycemia, without long-term current use of insulin (CMS/[facility])     5. AV block         PLAN:  1. CAD No anginal equivalent and just had a normal stress test 15 months ago. Continue to monitor and risk factor reduction with weight loss with carbohydrate restriction and continued therapy with statin and aspirin.  Low threshold for ischemic evaluation with any new symptoms  2. HTN Mildly Elevated pressures on home recordings Continue to monitor   3. Hl on statin  4. DM poor dietary compliance-escalation of RX recently  5. OMAIRA on niv  6.  AV block and sinus bradycardia  for his pacer to be checked at follow-up visits    Shala Chaparro MD, thank you for referring Mr. Lerner for evaluation.  I have forwarded my electronically generated recommendations to you for review.  Please do not hesitate to call with any questions.    Scribed for Prashant King MD by Yamilka Williamson PA-C. 2/27/2019  3:11 PM   I, Prashant King MD, personally performed the services described in this documentation as scribed by the above named individual in my presence, and it is both accurate and complete.  2/27/2019  3:11 PM    Prashant King MD, [facility]

## 2019-03-01 ENCOUNTER — OFFICE VISIT (OUTPATIENT)
Dept: SLEEP MEDICINE | Facility: HOSPITAL | Age: 79
End: 2019-03-01

## 2019-03-01 VITALS
WEIGHT: 265 LBS | HEIGHT: 71 IN | HEART RATE: 70 BPM | SYSTOLIC BLOOD PRESSURE: 140 MMHG | BODY MASS INDEX: 37.1 KG/M2 | DIASTOLIC BLOOD PRESSURE: 65 MMHG | OXYGEN SATURATION: 97 %

## 2019-03-01 DIAGNOSIS — G47.33 OSA (OBSTRUCTIVE SLEEP APNEA): Primary | ICD-10-CM

## 2019-03-01 PROCEDURE — 99213 OFFICE O/P EST LOW 20 MIN: CPT | Performed by: NURSE PRACTITIONER

## 2019-03-01 NOTE — PROGRESS NOTES
Subjective:   Follow-up      Chief Complaint:   Chief Complaint   Patient presents with   • Follow-up       HPI:    Javier Lerner is a 78 y.o. male here for follow-up of magno.  Patient was last seen 1/11/19 and did require a new machine as he does use BiPAP for obstructive sleep apnea.  Patient continues to do well.  Patient sleeps 7 hours nightly and does feel refreshed upon awakening.  Patient has an [place] score of 5/24.  She has a history of bradycardia, hypertension, chronic kidney disease, severe obesity, and diabetes.  Patient does see Dr. Shala Chaparro for his primary care.  Shell wishes to continue BiPAP as he is doing very well and no complaints today.      Current medications are:   Current Outpatient Medications:   •  amLODIPine (NORVASC) 10 MG tablet, Take 5 mg by mouth 2 (Two) Times a Day., Disp: , Rfl:   •  apixaban (ELIQUIS) 5 MG tablet tablet, Take 1 tablet by mouth Every 12 (Twelve) Hours., Disp: 60 tablet, Rfl: 11  •  doxazosin (CARDURA) 4 MG tablet, Take 4 mg by mouth Every Night., Disp: , Rfl:   •  fenofibrate 160 MG tablet, Take 160 mg by mouth Every Morning., Disp: , Rfl:   •  Insulin Glargine (TOUJEO SOLOSTAR) 300 UNIT/ML solution pen-injector, Inject  under the skin into the appropriate area as directed 2 (Two) Times a Day. 62 units in the am, 74 in the pm., Disp: , Rfl:   •  insulin lispro (humaLOG) 100 UNIT/ML injection, Inject 12 Units under the skin Daily., Disp: , Rfl:   •  linagliptin (TRADJENTA) 5 MG tablet tablet, Take 5 mg by mouth Daily., Disp: , Rfl:   •  lisinopril (PRINIVIL,ZESTRIL) 30 MG tablet, Take 30 mg by mouth Daily., Disp: , Rfl:   •  lisinopril-hydrochlorothiazide (PRINZIDE,ZESTORETIC) 20-12.5 MG per tablet, Take 2 tablets by mouth Daily., Disp: , Rfl:   •  Magnesium 250 MG tablet, Take  by mouth Every Night., Disp: , Rfl:   •  metFORMIN (GLUCOPHAGE) 1000 MG tablet, Take 1,000 mg by mouth 2 (Two) Times a Day With Meals., Disp: , Rfl:   •  nitroglycerin (NITROSTAT)  0.4 MG SL tablet, Place 0.4 mg under the tongue Every 5 (Five) Minutes As Needed for chest pain. Take no more than 3 doses in 15 minutes., Disp: , Rfl:   •  simvastatin (ZOCOR) 20 MG tablet, Take 20 mg by mouth Every Night., Disp: , Rfl: .      The patient's relevant past medical, surgical, family and social history were reviewed and updated in Epic as appropriate.       Review of Systems   Constitutional: Positive for diaphoresis.   HENT: Positive for congestion and postnasal drip.    Eyes: Positive for visual disturbance.   Respiratory: Positive for apnea and shortness of breath.    Cardiovascular: Positive for palpitations.   Endocrine: Positive for polydipsia and polyuria.   Musculoskeletal: Positive for arthralgias and joint swelling.   Neurological: Positive for headaches.   Psychiatric/Behavioral: Positive for dysphoric mood and sleep disturbance. The patient is nervous/anxious.    All other systems reviewed and are negative.        Objective:    Physical Exam   Constitutional: He is oriented to person, place, and time. He appears well-developed and well-nourished.   HENT:   Head: Normocephalic and atraumatic.   Mouth/Throat: Oropharynx is clear and moist.   Mallampati 2 anatomy   Eyes: Conjunctivae are normal.   Neck: Neck supple. No thyromegaly present.   Cardiovascular: Normal rate and regular rhythm.   Pulmonary/Chest: Effort normal and breath sounds normal.   Lymphadenopathy:     He has no cervical adenopathy.   Neurological: He is alert and oriented to person, place, and time.   Skin: Skin is warm and dry.   Psychiatric: He has a normal mood and affect. His behavior is normal. Judgment and thought content normal.   Nursing note and vitals reviewed.  37/37 days of use.  Greater than 4 hour usage 97.3%.  AHI 1.1.  EPAP of 10 IPAP of 15.  Download has been reviewed with patient.      ASSESSMENT/PLAN    Javier was seen today for follow-up.    Diagnoses and all orders for this visit:    OMAIRA (obstructive  sleep apnea)  -     CPAP Therapy            1. Counseled patient regarding multimodal approach with healthy nutrition, healthy sleep, regular physical activity, social activities, counseling, and medications. Encouraged to practice lateral sleep position. Avoid alcohol and sedatives close to bedtime.  2. Refill supplies ×1 year.  Return to clinic one year or sooner symptoms warrant.    I have reviewed the results of my evaluation and impression and discussed my recommendations in detail with the patient.      Signed by  BOB Gomez    March 1, 2019      CC: Shala Chaparro MD          No ref. provider found

## 2019-03-01 NOTE — PATIENT INSTRUCTIONS

## 2019-05-21 ENCOUNTER — CLINICAL SUPPORT NO REQUIREMENTS (OUTPATIENT)
Dept: CARDIOLOGY | Facility: CLINIC | Age: 79
End: 2019-05-21

## 2019-05-21 DIAGNOSIS — R00.1 BRADYCARDIA: ICD-10-CM

## 2019-05-21 PROCEDURE — 93294 REM INTERROG EVL PM/LDLS PM: CPT | Performed by: INTERNAL MEDICINE

## 2019-05-21 PROCEDURE — 93296 REM INTERROG EVL PM/IDS: CPT | Performed by: INTERNAL MEDICINE

## 2019-06-27 ENCOUNTER — TELEPHONE (OUTPATIENT)
Dept: CARDIOLOGY | Facility: CLINIC | Age: 79
End: 2019-06-27

## 2019-08-20 ENCOUNTER — CLINICAL SUPPORT NO REQUIREMENTS (OUTPATIENT)
Dept: CARDIOLOGY | Facility: CLINIC | Age: 79
End: 2019-08-20

## 2019-08-20 DIAGNOSIS — R00.1 BRADYCARDIA: Primary | ICD-10-CM

## 2019-09-03 ENCOUNTER — OFFICE VISIT (OUTPATIENT)
Dept: CARDIOLOGY | Facility: CLINIC | Age: 79
End: 2019-09-03

## 2019-09-03 VITALS
BODY MASS INDEX: 37.15 KG/M2 | WEIGHT: 265.4 LBS | DIASTOLIC BLOOD PRESSURE: 60 MMHG | SYSTOLIC BLOOD PRESSURE: 130 MMHG | HEIGHT: 71 IN | HEART RATE: 70 BPM | OXYGEN SATURATION: 93 %

## 2019-09-03 DIAGNOSIS — R00.1 SEVERE SINUS BRADYCARDIA: Primary | ICD-10-CM

## 2019-09-03 PROCEDURE — 99214 OFFICE O/P EST MOD 30 MIN: CPT | Performed by: PHYSICIAN ASSISTANT

## 2019-09-03 PROCEDURE — 93280 PM DEVICE PROGR EVAL DUAL: CPT | Performed by: PHYSICIAN ASSISTANT

## 2019-09-03 RX ORDER — METOPROLOL SUCCINATE 25 MG/1
25 TABLET, EXTENDED RELEASE ORAL DAILY
Qty: 30 TABLET | Refills: 11 | Status: SHIPPED | OUTPATIENT
Start: 2019-09-03 | End: 2019-09-10 | Stop reason: SDUPTHER

## 2019-09-03 NOTE — PROGRESS NOTES
Javier Lerner  1940  PCP: Shala Chaparro MD    SUBJECTIVE:   Javier Lerner is a 78 y.o. male seen for a follow up visit regarding the following:     Chief Complaint: Follow up for sss, ppm     HPI:    Since last visit the patient's status has been stable. HE has not had any issues with cp, sob, edema, orthopnea, palps, or dizziness. Recently closed his [employer] due to financial issues.     History:   PROBLEM LIST:  1. Symptomatic bradycardia  1. [facility]: holter avg rate mid 40's and planned PPM in January  2. Echo 10/16: LVEF >70, Anatomically and functionally normal valves  3. MPS 10/16: normal perfusion with no evidence of ischemia, EF 67%.  4. BTK ppm 1/17- abh lower rate 70  2. Hypertension  3. Chronic fatigue  4. CKD, stage 3, GFR 30-59 1/17 creat 1.7  5. Obesity  6. DMT2  7. A1c 9-7.2 2017  8. OMAIRA with CPAP  9. Tobacco use - cessation 3 years ago  10. PAF  1. [id number] 4, a/c w Xarelto        Current Outpatient Medications:   •  amLODIPine (NORVASC) 10 MG tablet, Take 5 mg by mouth 2 (Two) Times a Day., Disp: , Rfl:   •  apixaban (ELIQUIS) 5 MG tablet tablet, Take 1 tablet by mouth Every 12 (Twelve) Hours., Disp: 180 tablet, Rfl: 3  •  doxazosin (CARDURA) 4 MG tablet, Take 4 mg by mouth Every Night., Disp: , Rfl:   •  fenofibrate 160 MG tablet, Take 160 mg by mouth Every Morning., Disp: , Rfl:   •  Insulin Glargine (TOUJEO SOLOSTAR) 300 UNIT/ML solution pen-injector, Inject  under the skin into the appropriate area as directed 2 (Two) Times a Day. 62 units in the am, 74 in the pm., Disp: , Rfl:   •  insulin lispro (humaLOG) 100 UNIT/ML injection, Inject 14 Units under the skin into the appropriate area as directed Daily., Disp: , Rfl:   •  linagliptin (TRADJENTA) 5 MG tablet tablet, Take 5 mg by mouth Daily., Disp: , Rfl:   •  lisinopril (PRINIVIL,ZESTRIL) 30 MG tablet, Take 30 mg by mouth Daily., Disp: , Rfl:   •  lisinopril-hydrochlorothiazide (PRINZIDE,ZESTORETIC) 20-12.5 MG per  tablet, Take 2 tablets by mouth Daily., Disp: , Rfl:   •  Magnesium 250 MG tablet, Take 1 tablet by mouth Every Night., Disp: , Rfl:   •  nitroglycerin (NITROSTAT) 0.4 MG SL tablet, Place 0.4 mg under the tongue Every 5 (Five) Minutes As Needed for chest pain. Take no more than 3 doses in 15 minutes., Disp: , Rfl:   •  simvastatin (ZOCOR) 20 MG tablet, Take 20 mg by mouth Every Night., Disp: , Rfl:   •  metoprolol succinate XL (TOPROL-XL) 25 MG 24 hr tablet, Take 1 tablet by mouth Daily., Disp: 30 tablet, Rfl: 11    Past Medical History, Past Surgical History, Family history, Social History, and Medications were all reviewed with the patient today and updated as necessary.       Patient Active Problem List   Diagnosis   • OMAIRA (obstructive sleep apnea)   • Diabetes mellitus with insulin therapy (CMS/[facility])   • Hypertension   • CKD (chronic kidney disease) stage 3, GFR 30-59 ml/min (CMS/[facility])   • Bradycardia   • Dizziness   • Chronic fatigue   • Class 2 severe obesity due to excess calories with serious comorbidity and body mass index (BMI) of 37.0 to 37.9 in adult (CMS/[facility])   • Chronotropic incompetence   • Severe sinus bradycardia     Allergies   Allergen Reactions   • Penicillins Other (See Comments)     WELTS ON HANDS AND TURN PURPLE   • Tetracyclines & Related      Blisters on skin     Past Medical History:   Diagnosis Date   • Arthritis    • Bradycardia    • Chronic fatigue 9/22/2016   • CKD (chronic kidney disease) stage 3, GFR 30-59 ml/min (CMS/HCC)    • Diabetes mellitus (CMS/[facility])     DX. 1997, FSBS 1 X DAY   • Enlarged prostate    • Heartburn    • Hyperlipidemia    • Hypertension    • Obesity, Class II, BMI 35-39.9, with comorbidity    • OMAIRA (obstructive sleep apnea)    • Pancreatitis     HISTORY OF   • Wears glasses      Past Surgical History:   Procedure Laterality Date   • ANKLE SURGERY Right    • APPENDECTOMY     • CARDIAC ELECTROPHYSIOLOGY PROCEDURE N/A 1/5/2017    Procedure: Pacemaker DC new;  Surgeon:  "Bryson Leon MD;  Location: [facility] INVASIVE LOCATION;  Service:    • CATARACT EXTRACTION, BILATERAL     • CHOLECYSTECTOMY     • COLONOSCOPY     • HERNIA REPAIR      UMBILICAL    • PACEMAKER IMPLANTATION  2017    PER DR. LEON     Family History   Problem Relation Age of Onset   • Heart disease Mother    • Diabetes Father    • Hypertension Father    • Heart disease Father    • Sleep apnea Father      Social History     Tobacco Use   • Smoking status: Former Smoker     Packs/day: 0.50     Years: 60.00     Pack years: 30.00     Types: Cigarettes, Pipe     Last attempt to quit: 2014     Years since quittin.6   • Smokeless tobacco: Never Used   Substance Use Topics   • Alcohol use: No         PHYSICAL EXAM:    /60 (BP Location: Right arm, Patient Position: Sitting)   Pulse 70   Ht 180.3 cm (71\")   Wt 120 kg (265 lb 6.4 oz)   SpO2 93%   BMI 37.02 kg/m²        Wt Readings from Last 5 Encounters:   19 120 kg (265 lb 6.4 oz)   19 120 kg (265 lb)   19 119 kg (263 lb)   19 121 kg (266 lb 3.2 oz)   19 121 kg (267 lb)       BP Readings from Last 5 Encounters:   19 130/60   19 140/65   19 144/64   19 140/62   19 136/68       General-Well Nourished, Well developed  Eyes - PERRLA  Neck- supple, No mass  CV- regular rate and rhythm, no MRG, No edema  Lung- clear bilaterally  Abd- soft, +BS  Musc/skel - Norm strength and range of motion  Skin- warm and dry  Neuro - Alert & Oriented x 3, appropriate mood.        Medical problems and test results were reviewed with the patient today.     No results found for this or any previous visit (from the past 672 hour(s)).      EKG: (EKG has been independently visualized by me and summarized below)    Procedures     ASSESSMENT and PLAN    1. Severe sinus bradycardia, s/p DDD PPM implant with normal function today.    2. Atrial Fibrillation-discovered on device interrogation and started on Eliqius at " last visit. 13 mode switches <1 min of afib. Continue Eliquis 5mg BID.   3. HTN- controlled  4. NSVT- 11 beat run detected on device interrogation. Normal stress and echo in 2016 with no anginal symptoms. Will start Toprol 25mg daily. Monitor.         Return in about 6 months (around 3/3/2020).      Ashley Friedman PA-C   Cardiology/Electrophysiology  9/3/2019  3:09 PM

## 2019-09-10 RX ORDER — METOPROLOL SUCCINATE 25 MG/1
25 TABLET, EXTENDED RELEASE ORAL DAILY
Qty: 90 TABLET | Refills: 3 | Status: SHIPPED | OUTPATIENT
Start: 2019-09-10 | End: 2020-09-08

## 2019-11-19 ENCOUNTER — CLINICAL SUPPORT NO REQUIREMENTS (OUTPATIENT)
Dept: CARDIOLOGY | Facility: CLINIC | Age: 79
End: 2019-11-19

## 2019-11-19 DIAGNOSIS — R00.1 BRADYCARDIA: ICD-10-CM

## 2019-11-19 PROCEDURE — 93294 REM INTERROG EVL PM/LDLS PM: CPT | Performed by: INTERNAL MEDICINE

## 2019-11-19 PROCEDURE — 93296 REM INTERROG EVL PM/IDS: CPT | Performed by: INTERNAL MEDICINE

## 2019-12-01 ENCOUNTER — CLINICAL SUPPORT NO REQUIREMENTS (OUTPATIENT)
Dept: CARDIOLOGY | Facility: CLINIC | Age: 79
End: 2019-12-01

## 2019-12-01 DIAGNOSIS — R00.1 SEVERE SINUS BRADYCARDIA: Primary | ICD-10-CM

## 2019-12-11 ENCOUNTER — OFFICE VISIT (OUTPATIENT)
Dept: CARDIOLOGY | Facility: CLINIC | Age: 79
End: 2019-12-11

## 2019-12-11 VITALS
DIASTOLIC BLOOD PRESSURE: 68 MMHG | BODY MASS INDEX: 37.38 KG/M2 | HEART RATE: 80 BPM | SYSTOLIC BLOOD PRESSURE: 130 MMHG | HEIGHT: 71 IN | WEIGHT: 267 LBS

## 2019-12-11 DIAGNOSIS — Z79.4 TYPE 2 DIABETES MELLITUS WITH HYPERGLYCEMIA, WITH LONG-TERM CURRENT USE OF INSULIN (HCC): ICD-10-CM

## 2019-12-11 DIAGNOSIS — E11.65 TYPE 2 DIABETES MELLITUS WITH HYPERGLYCEMIA, WITH LONG-TERM CURRENT USE OF INSULIN (HCC): ICD-10-CM

## 2019-12-11 DIAGNOSIS — I10 ESSENTIAL HYPERTENSION: ICD-10-CM

## 2019-12-11 DIAGNOSIS — I49.5 SSS (SICK SINUS SYNDROME) (HCC): Primary | ICD-10-CM

## 2019-12-11 DIAGNOSIS — E78.2 MIXED HYPERLIPIDEMIA: ICD-10-CM

## 2019-12-11 PROCEDURE — 99214 OFFICE O/P EST MOD 30 MIN: CPT | Performed by: INTERNAL MEDICINE

## 2019-12-11 PROCEDURE — 93280 PM DEVICE PROGR EVAL DUAL: CPT | Performed by: INTERNAL MEDICINE

## 2019-12-11 NOTE — PROGRESS NOTES
[facility] at [facility]  Office Progress Note  Javier Lerner  1940      Visit Date: 12/11/19    PCP: Shala Chaparro MD  [address]  [facility] [address]    IDENTIFICATION: A 78 y.o. 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  Motor homes to [facility]    PROBLEM LIST:  1. CAD  MPS 10/16: normal perfusion with no evidence of ischemia, EF 67%.  2. Symptomatic bradycardia  1. Hesselson: holter avg rate mid 40's and planned PPM in January  2. Echo 10/16: LVEF >70, Anatomically and functionally normal valves  3. BTK ppm 1/17- abh lower rate 70  3. HTN  4. HLD  1. 7/18/2018   HDL 30 LDL 46  5. Chronic fatigue  6. CKD, stage 3, GFR 30-59 1/17 creat 1.7  7. Obesity  8. DMT2  9. A1c 9-7.2 2017  10. OMAIRA with CPAP  11. Tobacco use - cessation 3 years ago  12. PAF  1. [id number] 4, a/c w Xarelto     Chief Complaint   Patient presents with   • Coronary Artery Disease       Allergies  Allergies   Allergen Reactions   • Penicillins Other (See Comments)     WELTS ON HANDS AND TURN PURPLE   • Tetracyclines & Related      Blisters on skin       Current Medications    Current Outpatient Medications:   •  amLODIPine (NORVASC) 10 MG tablet, Take 5 mg by mouth 2 (Two) Times a Day., Disp: , Rfl:   •  apixaban (ELIQUIS) 5 MG tablet tablet, Take 1 tablet by mouth Every 12 (Twelve) Hours., Disp: 180 tablet, Rfl: 3  •  doxazosin (CARDURA) 4 MG tablet, Take 4 mg by mouth Every Night., Disp: , Rfl:   •  fenofibrate 160 MG tablet, Take 160 mg by mouth Every Morning., Disp: , Rfl:   •  Insulin Glargine (TOUJEO SOLOSTAR) 300 UNIT/ML solution pen-injector, Inject  under the skin into the appropriate area as directed 2 (Two) Times a Day. 70 units in the am, 90 in the pm., Disp: , Rfl:   •  insulin lispro (humaLOG) 100 UNIT/ML injection, Inject 14 Units under the skin into the appropriate area as directed Daily., Disp: , Rfl:   •  linagliptin (TRADJENTA) 5 MG tablet tablet,  "Take 5 mg by mouth Daily., Disp: , Rfl:   •  lisinopril (PRINIVIL,ZESTRIL) 30 MG tablet, Take 30 mg by mouth Daily., Disp: , Rfl:   •  lisinopril-hydrochlorothiazide (PRINZIDE,ZESTORETIC) 20-12.5 MG per tablet, Take 2 tablets by mouth Daily., Disp: , Rfl:   •  Magnesium 250 MG tablet, Take 1 tablet by mouth Every Night., Disp: , Rfl:   •  metoprolol succinate XL (TOPROL-XL) 25 MG 24 hr tablet, Take 1 tablet by mouth Daily., Disp: 90 tablet, Rfl: 3  •  nitroglycerin (NITROSTAT) 0.4 MG SL tablet, Place 0.4 mg under the tongue Every 5 (Five) Minutes As Needed for chest pain. Take no more than 3 doses in 15 minutes., Disp: , Rfl:   •  Semaglutide (OZEMPIC, 1 MG/DOSE, SC), Inject 0.5 mg under the skin into the appropriate area as directed 1 (One) Time Per Week., Disp: , Rfl:   •  simvastatin (ZOCOR) 20 MG tablet, Take 20 mg by mouth Every Night., Disp: , Rfl:       History of Present Illness   Javier Lerner is a 78 y.o. year old male here for follow up.  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  He has had no cardiac complaints however.  He is attempting to modify his diet [place] to this is limited to control his A1c.  He was recently transitioned to alternative diabetic agent for which she is tolerant of but he is unclear as to whether this will be affordable          OBJECTIVE:  Vitals:    12/11/19 1436   BP: 130/68   BP Location: Right arm   Patient Position: Sitting   Pulse: 80   Weight: 121 kg (267 lb)   Height: 180.3 cm (71\")     Physical Exam   Constitutional: He appears well-developed and well-nourished.   Neck: Normal range of motion. Neck supple. No hepatojugular reflux and no JVD present. Carotid bruit is not present. No tracheal deviation present. No thyromegaly present.   Cardiovascular: Normal rate, regular rhythm, S1 normal, S2 normal, intact distal pulses and normal pulses. PMI is not displaced. Exam reveals no gallop, no " distant heart sounds, no friction rub, no midsystolic click and no opening snap.   Murmur heard.  Pulses:       Radial pulses are 2+ on the right side, and 2+ on the left side.        Dorsalis pedis pulses are 2+ on the right side, and 2+ on the left side.        Posterior tibial pulses are 2+ on the right side, and 2+ on the left side.   Pulmonary/Chest: Effort normal and breath sounds normal. He has no wheezes. He has no rales.   Abdominal: Soft. Bowel sounds are normal. He exhibits no mass. There is no tenderness. There is no guarding.   Musculoskeletal: He exhibits edema.       Diagnostic Data:  Procedures  Pacer check  Acceptable threshold impedances  No mode switch  75% generator    ASSESSMENT:   Diagnosis Plan   1. SSS (sick sinus syndrome) (CMS/[facility])     2. Essential hypertension     3. Mixed hyperlipidemia     4. Type 2 diabetes mellitus with hyperglycemia, with long-term current use of insulin (CMS/[facility])         PLAN:  Sick sinus syndrome acceptable pacemaker function in the office today    Paroxysmal atrial fibrillation anticoagulated    Hypertension controlled doxazosin lisinopril    Dyslipidemia statin therapy    Diabetes with adjustment of medications for A1c tomorrow historically 7.2-9 range have counseled him regarding dietary need for carbohydrate restriction     follow-up 6 months    Shala Chaparro MD, thank you for referring Mr. Lerner for evaluation.  I have forwarded my electronically generated recommendations to you for review.  Please do not hesitate to call with any questions.      Prashant King MD, [facility]

## 2020-02-28 RX ORDER — APIXABAN 5 MG/1
TABLET, FILM COATED ORAL
Qty: 180 TABLET | Refills: 3 | Status: SHIPPED | OUTPATIENT
Start: 2020-02-28 | End: 2021-02-22

## 2020-06-17 ENCOUNTER — OFFICE VISIT (OUTPATIENT)
Dept: SLEEP MEDICINE | Facility: HOSPITAL | Age: 80
End: 2020-06-17

## 2020-06-17 VITALS
WEIGHT: 275 LBS | SYSTOLIC BLOOD PRESSURE: 156 MMHG | DIASTOLIC BLOOD PRESSURE: 76 MMHG | OXYGEN SATURATION: 97 % | HEIGHT: 71 IN | BODY MASS INDEX: 38.5 KG/M2 | HEART RATE: 72 BPM

## 2020-06-17 DIAGNOSIS — G47.33 OSA (OBSTRUCTIVE SLEEP APNEA): Primary | ICD-10-CM

## 2020-06-17 PROCEDURE — 99212 OFFICE O/P EST SF 10 MIN: CPT | Performed by: NURSE PRACTITIONER

## 2020-06-17 NOTE — PROGRESS NOTES
Chief Complaint:   Chief Complaint   Patient presents with   • Follow-up       HPI:    Javier Lerner is a 79 y.o. male here for follow-up of  Sleep apnea. Pt states he is doing well with BiPAP therapy.   Patient is sleeping 8 hours nightly and does feel refreshed upon awakening.  Patient will awaken about 1 time a night to use the restroom.  Patient has an [place] score of 3/24.  Patient states he is doing very well with BiPAP.  Patient has no concerns about skin irritation from the mask, no issue putting mask on or off, no air leak, no dry mucosa complaints, no difficulty breathing/shortness of breath while wearing BiPAP.  Patient does wish to continue therapy.      Current medications are:   Current Outpatient Medications:   •  amLODIPine (NORVASC) 10 MG tablet, Take 5 mg by mouth 2 (Two) Times a Day., Disp: , Rfl:   •  doxazosin (CARDURA) 4 MG tablet, Take 4 mg by mouth Every Night., Disp: , Rfl:   •  ELIQUIS 5 MG tablet tablet, TAKE 1 TABLET EVERY 12 HOURS, Disp: 180 tablet, Rfl: 3  •  fenofibrate 160 MG tablet, Take 160 mg by mouth Every Morning., Disp: , Rfl:   •  Insulin Glargine (TOUJEO SOLOSTAR) 300 UNIT/ML solution pen-injector, Inject  under the skin into the appropriate area as directed 2 (Two) Times a Day. 70 units in the am, 90 in the pm., Disp: , Rfl:   •  insulin lispro (humaLOG) 100 UNIT/ML injection, Inject 14 Units under the skin into the appropriate area as directed Daily., Disp: , Rfl:   •  linagliptin (TRADJENTA) 5 MG tablet tablet, Take 5 mg by mouth Daily., Disp: , Rfl:   •  lisinopril (PRINIVIL,ZESTRIL) 30 MG tablet, Take 30 mg by mouth Daily., Disp: , Rfl:   •  lisinopril-hydrochlorothiazide (PRINZIDE,ZESTORETIC) 20-12.5 MG per tablet, Take 2 tablets by mouth Daily., Disp: , Rfl:   •  Magnesium 250 MG tablet, Take 1 tablet by mouth Every Night., Disp: , Rfl:   •  metoprolol succinate XL (TOPROL-XL) 25 MG 24 hr tablet, Take 1 tablet by mouth Daily., Disp: 90 tablet, Rfl: 3  •   nitroglycerin (NITROSTAT) 0.4 MG SL tablet, Place 0.4 mg under the tongue Every 5 (Five) Minutes As Needed for chest pain. Take no more than 3 doses in 15 minutes., Disp: , Rfl:   •  Semaglutide (OZEMPIC, 1 MG/DOSE, SC), Inject 0.5 mg under the skin into the appropriate area as directed 1 (One) Time Per Week., Disp: , Rfl:   •  simvastatin (ZOCOR) 20 MG tablet, Take 20 mg by mouth Every Night., Disp: , Rfl: .      The patient's relevant past medical, surgical, family and social history were reviewed and updated in Epic as appropriate.       Review of Systems   Constitutional: Positive for diaphoresis.   HENT: Positive for postnasal drip.    Respiratory: Positive for apnea and shortness of breath.    Cardiovascular: Positive for palpitations.   Endocrine: Positive for polydipsia and polyuria.   Musculoskeletal: Positive for arthralgias, gait problem and joint swelling.   Neurological: Positive for headaches.   Psychiatric/Behavioral: Positive for dysphoric mood and sleep disturbance. The patient is nervous/anxious.    All other systems reviewed and are negative.        Objective:    Physical Exam   Constitutional: He is oriented to person, place, and time. He appears well-developed and well-nourished.   HENT:   Head: Normocephalic and atraumatic.   Mouth/Throat: Oropharynx is clear and moist.   Class 2 airway   Eyes: Conjunctivae are normal.   Neck: Neck supple.   Cardiovascular: Normal rate and regular rhythm.   Pulmonary/Chest: Effort normal and breath sounds normal.   Neurological: He is alert and oriented to person, place, and time.   Skin: Skin is warm and dry.   Psychiatric: He has a normal mood and affect. His behavior is normal. Judgment and thought content normal.       90/90 days of use, > 4 hour use 100%, ahi-1.8, epap 10, ipap 15  ASSESSMENT/PLAN    Javier was seen today for follow-up.    Diagnoses and all orders for this visit:    OMAIRA (obstructive sleep apnea)  -     CPAP Therapy            1. Counseled  patient regarding multimodal approach with healthy nutrition, healthy sleep, regular physical activity, social activities, counseling, and medications. Encouraged to practice  Lateral sleep position. Avoid alcohol and sedatives close to bedtime.  2. Fill supplies x1 year.  Return to clinic 1 year or sooner as symptoms warrant.    I have reviewed the results of my evaluation and impression and discussed my recommendations in detail with the patient.      Signed by  Adela Posey, APRN    June 17, 2020      CC: Shala Chaparro MD          No ref. provider found

## 2020-06-18 ENCOUNTER — OFFICE VISIT (OUTPATIENT)
Dept: CARDIOLOGY | Facility: CLINIC | Age: 80
End: 2020-06-18

## 2020-06-18 VITALS
WEIGHT: 273 LBS | HEART RATE: 85 BPM | OXYGEN SATURATION: 96 % | SYSTOLIC BLOOD PRESSURE: 142 MMHG | DIASTOLIC BLOOD PRESSURE: 74 MMHG | BODY MASS INDEX: 38.22 KG/M2 | HEIGHT: 71 IN | TEMPERATURE: 98.6 F

## 2020-06-18 DIAGNOSIS — E78.2 MIXED HYPERLIPIDEMIA: ICD-10-CM

## 2020-06-18 DIAGNOSIS — E11.65 TYPE 2 DIABETES MELLITUS WITH HYPERGLYCEMIA, WITHOUT LONG-TERM CURRENT USE OF INSULIN (HCC): ICD-10-CM

## 2020-06-18 DIAGNOSIS — I10 ESSENTIAL HYPERTENSION: ICD-10-CM

## 2020-06-18 DIAGNOSIS — R06.09 DOE (DYSPNEA ON EXERTION): Primary | ICD-10-CM

## 2020-06-18 DIAGNOSIS — N18.30 CKD (CHRONIC KIDNEY DISEASE) STAGE 3, GFR 30-59 ML/MIN (HCC): ICD-10-CM

## 2020-06-18 PROCEDURE — 99214 OFFICE O/P EST MOD 30 MIN: CPT | Performed by: INTERNAL MEDICINE

## 2020-06-18 NOTE — PROGRESS NOTES
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  [employer]otor homes to [facility]    PROBLEM LIST:  1. CAD  1. MPS 10/16: normal perfusion with no evidence of ischemia, EF 67%.  2. Symptomatic bradycardia  1. Hesselson: holter avg rate mid 40's and planned PPM in January  2. Echo 10/16: LVEF >70, Anatomically and functionally normal valves  3. BTK ppm 1/17- abh lower rate 70  3. HTN  4. HLD  1. 7/18/2018   HDL 30 LDL 46  2. 12/12/2019   HDL 29.8 LDL 46  5. Chronic fatigue  6. CKD, stage 3, GFR 30-59   1. 1/17 creat 1.7  2. 12/19 CR 1.68  7. Obesity  8. DMT2  9. A1c 9-7.2 2017  10. OMAIRA with CPAP  11. Tobacco use - cessation 3 years ago  12. PAF  1. [id number] 4, a/c w Xarelto       Chief Complaint   Patient presents with   • Coronary Artery Disease       Allergies  Allergies   Allergen Reactions   • Penicillins Other (See Comments)     WELTS ON HANDS AND TURN PURPLE   • Tetracyclines & Related      Blisters on skin       Current Medications    Current Outpatient Medications:   •  amLODIPine (NORVASC) 10 MG tablet, Take 5 mg by mouth 2 (Two) Times a Day., Disp: , Rfl:   •  doxazosin (CARDURA) 4 MG tablet, Take 4 mg by mouth Every Night., Disp: , Rfl:   •  ELIQUIS 5 MG tablet tablet, TAKE 1 TABLET EVERY 12 HOURS, Disp: 180 tablet, Rfl: 3  •  fenofibrate 160 MG tablet, Take 160 mg by mouth Every Morning., Disp: , Rfl:   •  Insulin Glargine (TOUJEO SOLOSTAR) 300 UNIT/ML solution pen-injector, Inject  under the skin into the appropriate area as directed 2 (Two) Times a Day. 78 units in the am, 90 in the pm., Disp: , Rfl:   •  insulin lispro (humaLOG) 100 UNIT/ML injection, Inject 22 Units under the skin into the  "appropriate area as directed 2 (two) times a day. 22 units in the morning and 28 units in the evening, Disp: , Rfl:   •  lisinopril (PRINIVIL,ZESTRIL) 30 MG tablet, Take 30 mg by mouth Daily., Disp: , Rfl:   •  lisinopril-hydrochlorothiazide (PRINZIDE,ZESTORETIC) 20-12.5 MG per tablet, Take 2 tablets by mouth Daily., Disp: , Rfl:   •  Magnesium 250 MG tablet, Take 1 tablet by mouth Every Night., Disp: , Rfl:   •  metoprolol succinate XL (TOPROL-XL) 25 MG 24 hr tablet, Take 1 tablet by mouth Daily., Disp: 90 tablet, Rfl: 3  •  nitroglycerin (NITROSTAT) 0.4 MG SL tablet, Place 0.4 mg under the tongue Every 5 (Five) Minutes As Needed for chest pain. Take no more than 3 doses in 15 minutes., Disp: , Rfl:   •  Semaglutide (OZEMPIC, 1 MG/DOSE, SC), Inject 1 mg under the skin into the appropriate area as directed 1 (One) Time Per Week., Disp: , Rfl:   •  simvastatin (ZOCOR) 20 MG tablet, Take 20 mg by mouth Every Night., Disp: , Rfl:       History of Present Illness   Javier Lerner is a 79 y.o. year old male here for follow up.  He has become more sedentary over the last year and with the COVID pandemic.  He notes no provocative chest symptoms specifically shortness of breath chest tightness.  He has continued his transition of his diabetic medication but no change in diet.          OBJECTIVE:  Vitals:    06/18/20 0941   BP: 142/74   BP Location: Left arm   Patient Position: Sitting   Pulse: 85   Temp: 98.6 °F (37 °C)   SpO2: 96%   Weight: 124 kg (273 lb)   Height: 180.3 cm (71\")     Physical Exam   Constitutional: He appears well-developed and well-nourished.   Neck: Normal range of motion. Neck supple. No hepatojugular reflux and no JVD present. Carotid bruit is not present. No tracheal deviation present. No thyromegaly present.   Cardiovascular: Normal rate, regular rhythm, S1 normal, S2 normal, intact distal pulses and normal pulses. PMI is not displaced. Exam reveals no gallop, no distant heart sounds, no " friction rub, no midsystolic click and no opening snap.   No murmur heard.  Pulses:       Radial pulses are 2+ on the right side, and 2+ on the left side.        Dorsalis pedis pulses are 2+ on the right side, and 2+ on the left side.        Posterior tibial pulses are 2+ on the right side, and 2+ on the left side.   Pulmonary/Chest: Effort normal and breath sounds normal. He has no wheezes. He has no rales.   Abdominal: Soft. Bowel sounds are normal. He exhibits no mass. There is no tenderness. There is no guarding.   Protuberant obese abdomen   Musculoskeletal: He exhibits edema.       Diagnostic Data:  Procedures      ASSESSMENT:   Diagnosis Plan   1. HERNÁNDEZ (dyspnea on exertion)     2. Type 2 diabetes mellitus with hyperglycemia, without long-term current use of insulin (CMS/[facility])     3. Mixed hyperlipidemia     4. Essential hypertension     5. CKD (chronic kidney disease) stage 3, GFR 30-59 ml/min (CMS/[facility])         PLAN:  Dyspnea multifactorial historically with diastolic dysfunction no recent ischemic evaluation.  Low threshold for noninvasive ischemic evaluation with any crescendo symptoms.  Patient would be high risk for contrast-induced nephropathy with any iodine based imaging.    Diabetes on oral and injectable agents continuing to transition as A1c not ideal.  Re-counseled need to comply with a low carbohydrate intake and intermittent fasting    Dyslipidemia on statin and fibrate therapy    Hypertension controlled lisinopril metoprolol amlodipine    CKD stage III followed [facility]    Shala Chaparro MD, thank you for referring Mr. Lerner for evaluation.  I have forwarded my electronically generated recommendations to you for review.  Please do not hesitate to call with any questions.      Prashant King MD, [facility]

## 2020-09-08 RX ORDER — METOPROLOL SUCCINATE 25 MG/1
TABLET, EXTENDED RELEASE ORAL
Qty: 90 TABLET | Refills: 3 | Status: SHIPPED | OUTPATIENT
Start: 2020-09-08 | End: 2020-12-07 | Stop reason: SDUPTHER

## 2020-09-14 ENCOUNTER — OFFICE VISIT (OUTPATIENT)
Dept: CARDIOLOGY | Facility: CLINIC | Age: 80
End: 2020-09-14

## 2020-09-14 VITALS
DIASTOLIC BLOOD PRESSURE: 72 MMHG | SYSTOLIC BLOOD PRESSURE: 122 MMHG | HEIGHT: 71 IN | OXYGEN SATURATION: 95 % | HEART RATE: 70 BPM | WEIGHT: 274.8 LBS | BODY MASS INDEX: 38.47 KG/M2

## 2020-09-14 DIAGNOSIS — R06.02 SOB (SHORTNESS OF BREATH): ICD-10-CM

## 2020-09-14 DIAGNOSIS — I10 ESSENTIAL HYPERTENSION: ICD-10-CM

## 2020-09-14 DIAGNOSIS — I49.5 SSS (SICK SINUS SYNDROME) (HCC): ICD-10-CM

## 2020-09-14 DIAGNOSIS — R00.1 SEVERE SINUS BRADYCARDIA: Primary | ICD-10-CM

## 2020-09-14 DIAGNOSIS — G47.33 OSA (OBSTRUCTIVE SLEEP APNEA): ICD-10-CM

## 2020-09-14 DIAGNOSIS — E11.65 TYPE 2 DIABETES MELLITUS WITH HYPERGLYCEMIA, WITHOUT LONG-TERM CURRENT USE OF INSULIN (HCC): ICD-10-CM

## 2020-09-14 PROCEDURE — 93280 PM DEVICE PROGR EVAL DUAL: CPT | Performed by: INTERNAL MEDICINE

## 2020-09-14 PROCEDURE — 99214 OFFICE O/P EST MOD 30 MIN: CPT | Performed by: INTERNAL MEDICINE

## 2020-09-14 RX ORDER — FUROSEMIDE 40 MG/1
40 TABLET ORAL DAILY
COMMUNITY

## 2020-09-14 NOTE — PROGRESS NOTES
Cardiac Electrophysiology Outpatient Follow Up Note            [facility] at [facility]    Follow Up Office Visit      Javier Lerner  [id number]  09/14/2020  [unfilled]  [unfilled]    Primary Care Physician: Shala Chaparro MD    Referred By: No ref. provider found    Subjective     Chief Complaint:   Chief Complaint   Patient presents with   • SSS   • HERNÁNDEZ       History of Present Illness:   Mr. Javier Lerner is a 79 y.o. male who presents to my electrophysiology clinic for follow up of sinus node dysfunction.  He feels fatigued with exertion still.  His heart rate is dramatically improved and he feels markedly better since before receiving the pacemaker but he still has some fatigue.  He wonders if this may be because of a blocked artery.  This happened with his neighbor and he is concerned.    No chest pain per se district shortness of breath with exertion which may be anginal equivalent..      Review of Systems:   Constitutional: No fevers or chills, no recent weight gain or weight loss or fatigue  Eyes: No visual loss, blurred vision, double vision, yellow sclerae.  ENT: No headaches, hearing loss, vertigo, congestion or sore throat.   Cardiovascular: Per HPI  Respiratory: No cough or wheezing, no sputum production, no hematemesis   Gastrointestinal: No abdominal pain, no nausea, vomiting, constipation, diarrhea, melena.   Genitourinary: No dysuria, hematuria or increased frequency.  Musculoskeletal:  No gait disturbance, weakness or joint pain or stiffness  Integumentary: No rashes, urticaria, ulcers or sores.   Neurological: No headache, dizziness, syncope, paralysis, ataxia, no prior CVA/TIA  Psychiatric: No anxiety, or depression  Endocrine: No diaphoresis, cold or heat intolerance. No polyuria or polydipsia.   Hematologic/Lymphatic: No anemia, abnormal bruising or bleeding. No history of DVT/PE.      Past Medical History:   Past Medical History:    Diagnosis Date   • Arthritis    • Bradycardia    • Chronic fatigue 2016   • CKD (chronic kidney disease) stage 3, GFR 30-59 ml/min (CMS/[facility])    • Diabetes mellitus (CMS/[facility])     DX. , FSBS 1 X DAY   • Enlarged prostate    • Heartburn    • Hyperlipidemia    • Hypertension    • Obesity, Class II, BMI 35-39.9, with comorbidity    • OMAIRA (obstructive sleep apnea)    • Pancreatitis     HISTORY OF   • Wears glasses        Past Surgical History:   Past Surgical History:   Procedure Laterality Date   • ANKLE SURGERY Right    • APPENDECTOMY     • CARDIAC ELECTROPHYSIOLOGY PROCEDURE N/A 2017    Procedure: Pacemaker DC new;  Surgeon: Bryson Leon MD;  Location: [facility] INVASIVE LOCATION;  Service:    • CATARACT EXTRACTION, BILATERAL     • CHOLECYSTECTOMY     • COLONOSCOPY     • HERNIA REPAIR      UMBILICAL    • PACEMAKER IMPLANTATION  2017    PER DR. LEON       Family History:   Family History   Problem Relation Age of Onset   • Heart disease Mother    • Diabetes Father    • Hypertension Father    • Heart disease Father    • Sleep apnea Father        Social History:   Social History     Socioeconomic History   • Marital status:      Spouse name: Not on file   • Number of children: Not on file   • Years of education: Not on file   • Highest education level: Not on file   Tobacco Use   • Smoking status: Former Smoker     Packs/day: 0.50     Years: 60.00     Pack years: 30.00     Types: Cigarettes, Pipe     Quit date: 2014     Years since quittin.6   • Smokeless tobacco: Never Used   Substance and Sexual Activity   • Alcohol use: No   • Drug use: No   • Sexual activity: Defer   Social History Narrative    Pt consumes 2 cups of coffee per day        Medications:     Current Outpatient Medications:   •  amLODIPine (NORVASC) 10 MG tablet, Take 5 mg by mouth 2 (Two) Times a Day., Disp: , Rfl:   •  ELIQUIS 5 MG tablet tablet, TAKE 1 TABLET EVERY 12 HOURS, Disp: 180 tablet, Rfl: 3  •   "fenofibrate 160 MG tablet, Take 160 mg by mouth Every Morning., Disp: , Rfl:   •  furosemide (LASIX) 20 MG tablet, Take 20 mg by mouth 2 (Two) Times a Day., Disp: , Rfl:   •  Insulin Glargine (TOUJEO SOLOSTAR) 300 UNIT/ML solution pen-injector, Inject  under the skin into the appropriate area as directed 2 (Two) Times a Day. 78 units in the am, 90 in the pm., Disp: , Rfl:   •  insulin lispro (humaLOG) 100 UNIT/ML injection, Inject 22 Units under the skin into the appropriate area as directed 2 (two) times a day. 22 units in the morning and 28 units in the evening, Disp: , Rfl:   •  lisinopril (PRINIVIL,ZESTRIL) 30 MG tablet, Take 30 mg by mouth Daily., Disp: , Rfl:   •  metoprolol succinate XL (TOPROL-XL) 25 MG 24 hr tablet, TAKE 1 TABLET DAILY, Disp: 90 tablet, Rfl: 3  •  nitroglycerin (NITROSTAT) 0.4 MG SL tablet, Place 0.4 mg under the tongue Every 5 (Five) Minutes As Needed for chest pain. Take no more than 3 doses in 15 minutes., Disp: , Rfl:   •  Semaglutide (OZEMPIC, 1 MG/DOSE, SC), Inject 1 mg under the skin into the appropriate area as directed 1 (One) Time Per Week., Disp: , Rfl:   •  simvastatin (ZOCOR) 20 MG tablet, Take 20 mg by mouth Every Night., Disp: , Rfl:   •  TURMERIC PO, Take 1,000 mg by mouth Daily., Disp: , Rfl:     Allergies:   Allergies   Allergen Reactions   • Penicillins Other (See Comments)     WELTS ON HANDS AND TURN PURPLE   • Tetracyclines & Related      Blisters on skin       Objective     Physical Exam:  Vital Signs:   Vitals:    09/14/20 1501   BP: 122/72   BP Location: Right arm   Patient Position: Sitting   Pulse: 70   SpO2: 95%   Weight: 125 kg (274 lb 12.8 oz)   Height: 180.3 cm (71\")     GEN: Well nourished, well-developed, no acute distress  HEENT: Normocephalic, atraumatic, moist mucous membranes  NECK: Supple, no JVD, no thyromegaly, no lymphadenopathy  CARD: Regular rate and rhythm, normal S1 & S2 are present.  No murmur, gallop or rubs are appreciated.  LUNGS: Clear to " auscultation bilateraly, normal respiratory effort  ABDOMEN: Soft, nontender, normal bowel sounds  EXTREMITIES: No gross deformities, no clubbing, cyanosis.  Edema none  SKIN: Warm, dry  NEURO: No focal deficits, alert and oriented x 3  PSYCHIATRIC: Normal affect and mood, appropriate use of semantics and logic.        Lab Results   Component Value Date    GLUCOSE 180 (H) 01/04/2017    CALCIUM 11.2 (H) 01/04/2017     01/04/2017    K 4.0 01/04/2017    CO2 26.0 01/04/2017     01/04/2017    BUN 19 01/04/2017    CREATININE 1.60 (H) 01/04/2017    EGFRIFNONA 42 (L) 01/04/2017    BCR 11.9 01/04/2017    ANIONGAP 10.0 01/04/2017     Lab Results   Component Value Date    WBC 7.24 01/04/2017    HGB 14.7 01/04/2017    HCT 41.6 01/04/2017    MCV 89.8 01/04/2017     01/04/2017     Lab Results   Component Value Date    INR 1.04 01/04/2017    PROTIME 11.3 01/04/2017     Lab Results   Component Value Date    TSH 1.811 09/20/2016       Cardiac Testing:     I personally viewed and interpreted the patient's EKG/Telemetry/lab data    Procedures    Tobacco Cessation: N/A  Obstructive Sleep Apnea Screening: N/A    Assessment & Plan      Pleasant 79-year-old gentleman sinus no dysfunction dual-chamber permanent pacemaker implanted manufactured by [employer] with good improvement in functional capacity.  He still some shortness of breath.  Is worth investigating and need if this is an anginal equivalent.  We will order an nuclear myocardial perfusion study.    Far from this he is adequate pacing sensing safety margins.  His pacemaker is probably program.  We will see him back in a year.    Javier was seen today for sss and gutiérrez.    Diagnoses and all orders for this visit:    Severe sinus bradycardia    SSS (sick sinus syndrome) (CMS/[facility])            Follow Up:       Thank you for allowing me to participate in the care of your patient. Please to not hesitate to contact me with additional questions or concerns.        Ish VACA  DO Melodie, FACC, RS  Cardiac Electrophysiologist

## 2020-09-18 ENCOUNTER — APPOINTMENT (OUTPATIENT)
Dept: PREADMISSION TESTING | Facility: HOSPITAL | Age: 80
End: 2020-09-18

## 2020-09-18 PROCEDURE — C9803 HOPD COVID-19 SPEC COLLECT: HCPCS

## 2020-09-18 PROCEDURE — U0004 COV-19 TEST NON-CDC HGH THRU: HCPCS

## 2020-09-19 LAB — SARS-COV-2 RNA NOSE QL NAA+PROBE: NOT DETECTED

## 2020-09-21 ENCOUNTER — HOSPITAL ENCOUNTER (OUTPATIENT)
Dept: CARDIOLOGY | Facility: HOSPITAL | Age: 80
Discharge: HOME | End: 2020-09-21
Admitting: INTERNAL MEDICINE

## 2020-09-21 VITALS — HEIGHT: 71 IN | BODY MASS INDEX: 38.36 KG/M2 | WEIGHT: 274 LBS

## 2020-09-21 DIAGNOSIS — R06.02 SOB (SHORTNESS OF BREATH): ICD-10-CM

## 2020-09-21 PROCEDURE — 93018 CV STRESS TEST I&R ONLY: CPT | Performed by: INTERNAL MEDICINE

## 2020-09-21 PROCEDURE — 78452 HT MUSCLE IMAGE SPECT MULT: CPT | Performed by: INTERNAL MEDICINE

## 2020-09-21 PROCEDURE — 78452 HT MUSCLE IMAGE SPECT MULT: CPT

## 2020-09-21 PROCEDURE — 93017 CV STRESS TEST TRACING ONLY: CPT

## 2020-09-21 PROCEDURE — A9500 TC99M SESTAMIBI: HCPCS | Performed by: INTERNAL MEDICINE

## 2020-09-21 PROCEDURE — 25010000002 REGADENOSON 0.4 MG/5ML SOLUTION: Performed by: INTERNAL MEDICINE

## 2020-09-21 PROCEDURE — 0 TECHNETIUM SESTAMIBI: Performed by: INTERNAL MEDICINE

## 2020-09-21 RX ADMIN — TECHNETIUM TC 99M SESTAMIBI 1 DOSE: 1 INJECTION INTRAVENOUS at 08:30

## 2020-09-21 RX ADMIN — TECHNETIUM TC 99M SESTAMIBI 1 DOSE: 1 INJECTION INTRAVENOUS at 10:23

## 2020-09-21 RX ADMIN — REGADENOSON 0.4 MG: 0.08 INJECTION, SOLUTION INTRAVENOUS at 10:19

## 2020-09-22 ENCOUNTER — TELEPHONE (OUTPATIENT)
Dept: CARDIOLOGY | Facility: CLINIC | Age: 80
End: 2020-09-22

## 2020-09-22 LAB
BH CV NUCLEAR PRIOR STUDY: 1
BH CV STRESS BP STAGE 2: NORMAL
BH CV STRESS BP STAGE 4: NORMAL
BH CV STRESS COMMENTS STAGE 1: NORMAL
BH CV STRESS DOSE REGADENOSON STAGE 1: 0.4
BH CV STRESS DURATION MIN STAGE 1: 1
BH CV STRESS DURATION MIN STAGE 2: 1
BH CV STRESS DURATION MIN STAGE 3: 1
BH CV STRESS DURATION MIN STAGE 4: 1
BH CV STRESS DURATION SEC STAGE 1: 0
BH CV STRESS DURATION SEC STAGE 2: 0
BH CV STRESS DURATION SEC STAGE 3: 0
BH CV STRESS DURATION SEC STAGE 4: 0
BH CV STRESS HR STAGE 1: 82
BH CV STRESS HR STAGE 2: 90
BH CV STRESS HR STAGE 3: 92
BH CV STRESS HR STAGE 4: 83
BH CV STRESS PROTOCOL 1: NORMAL
BH CV STRESS RECOVERY BP: NORMAL MMHG
BH CV STRESS RECOVERY HR: 81 BPM
BH CV STRESS STAGE 1: 1
BH CV STRESS STAGE 2: 2
BH CV STRESS STAGE 3: 3
BH CV STRESS STAGE 4: 4
LV EF NUC BP: 60 %
MAXIMAL PREDICTED HEART RATE: 141 BPM
PERCENT MAX PREDICTED HR: 65.25 %
STRESS BASELINE BP: NORMAL MMHG
STRESS BASELINE HR: 77 BPM
STRESS O2 SAT REST: 97 %
STRESS PERCENT HR: 77 %
STRESS POST PEAK BP: NORMAL MMHG
STRESS POST PEAK HR: 92 BPM
STRESS TARGET HR: 120 BPM

## 2020-09-22 NOTE — TELEPHONE ENCOUNTER
Ish Sewell, Jessica Chavez, KADEEM             Can you call and tell Mr. Lerner that his stress is normal please?      Patient notified.

## 2020-09-23 ENCOUNTER — TELEPHONE (OUTPATIENT)
Dept: CARDIOLOGY | Facility: CLINIC | Age: 80
End: 2020-09-23

## 2020-09-23 NOTE — TELEPHONE ENCOUNTER
Spoke with patient regarding most recent stress test per  it was low risk and within normal limits. Pt verbalized understanding

## 2020-09-29 ENCOUNTER — TELEPHONE (OUTPATIENT)
Dept: CARDIOLOGY | Facility: CLINIC | Age: 80
End: 2020-09-29

## 2020-09-29 NOTE — TELEPHONE ENCOUNTER
Ingrid at Dr. Gatica's office is requesting clearance for the patient to have a tooth extraction with local anesthesia on Friday. They would like her to hold Eliquis today through Friday. I this ok with you?                (P)[phone]  (F)[phone]

## 2020-09-30 NOTE — TELEPHONE ENCOUNTER
Ingrid notified and aware. She said that she would put a note in the chart and make Dr. Gatica aware.

## 2020-11-16 ENCOUNTER — OFFICE VISIT (OUTPATIENT)
Dept: FAMILY MEDICINE CLINIC | Facility: CLINIC | Age: 80
End: 2020-11-16

## 2020-11-16 VITALS
TEMPERATURE: 97.9 F | BODY MASS INDEX: 39.2 KG/M2 | WEIGHT: 280 LBS | DIASTOLIC BLOOD PRESSURE: 58 MMHG | SYSTOLIC BLOOD PRESSURE: 162 MMHG | HEART RATE: 86 BPM | OXYGEN SATURATION: 98 % | HEIGHT: 71 IN

## 2020-11-16 DIAGNOSIS — N18.30 STAGE 3 CHRONIC KIDNEY DISEASE, UNSPECIFIED WHETHER STAGE 3A OR 3B CKD (HCC): ICD-10-CM

## 2020-11-16 DIAGNOSIS — E11.9 DIABETES MELLITUS WITH INSULIN THERAPY (HCC): ICD-10-CM

## 2020-11-16 DIAGNOSIS — I10 ESSENTIAL HYPERTENSION: ICD-10-CM

## 2020-11-16 DIAGNOSIS — J20.9 ACUTE BRONCHITIS, UNSPECIFIED ORGANISM: Primary | ICD-10-CM

## 2020-11-16 DIAGNOSIS — G47.33 OSA (OBSTRUCTIVE SLEEP APNEA): ICD-10-CM

## 2020-11-16 DIAGNOSIS — Z79.4 DIABETES MELLITUS WITH INSULIN THERAPY (HCC): ICD-10-CM

## 2020-11-16 DIAGNOSIS — R05.9 COUGH: ICD-10-CM

## 2020-11-16 PROCEDURE — 99204 OFFICE O/P NEW MOD 45 MIN: CPT | Performed by: FAMILY MEDICINE

## 2020-11-16 RX ORDER — INSULIN HUMAN 500 [IU]/ML
INJECTION, SOLUTION SUBCUTANEOUS
COMMUNITY
End: 2022-10-27 | Stop reason: SDUPTHER

## 2020-11-16 RX ORDER — PROMETHAZINE HYDROCHLORIDE AND CODEINE PHOSPHATE 6.25; 1 MG/5ML; MG/5ML
5 SYRUP ORAL EVERY 4 HOURS PRN
Qty: 240 ML | Refills: 1 | Status: SHIPPED | OUTPATIENT
Start: 2020-11-16 | End: 2021-09-20

## 2020-11-16 RX ORDER — FLURBIPROFEN SODIUM 0.3 MG/ML
SOLUTION/ DROPS OPHTHALMIC
COMMUNITY
Start: 2020-08-16 | End: 2022-11-23

## 2020-11-16 RX ORDER — BLOOD SUGAR DIAGNOSTIC
STRIP MISCELLANEOUS
COMMUNITY
Start: 2020-09-21

## 2020-11-16 RX ORDER — LEVOFLOXACIN 500 MG/1
500 TABLET, FILM COATED ORAL DAILY
Qty: 10 TABLET | Refills: 0 | Status: SHIPPED | OUTPATIENT
Start: 2020-11-16 | End: 2021-11-23

## 2020-11-19 NOTE — PROGRESS NOTES
Subjective   Javier Lerner is a 79 y.o. male    Chief Complaint    Establish primary care  Coronary artery disease  Chronic kidney disease  Cardiac arrhythmia with pacemaker  Diabetes mellitus  Obstructive sleep apnea    History of Present Illness  Patient presents today as a new patient to establish primary care.  He has multiple specialists who assist with his care as he has multiple medical problems including type 2 diabetes mellitus on insulin, coronary artery disease, cardiac arrhythmia, chronic kidney disease, and obesity.  He sees multiple medical specialists.  He tells me that he sees most of them on a regular basis.  We will not intervene with their care and hopefully complement them.  He is complaining today primarily of a persistent cough that has been productive that has not cleared with a course of antibiotics and antitussive agents.  He finished his antibiotics yesterday but does not feel that he is any better.  His cough is at times productive and often clear but also at times yellow to green color.    The following portions of the patient's history were reviewed and updated as appropriate: allergies, current medications, past social history and problem list    Review of Systems   Constitutional: Negative.  Negative for appetite change, chills, diaphoresis, fatigue, fever and unexpected weight change.   HENT: Negative.    Eyes: Negative.  Negative for visual disturbance.   Respiratory: Positive for cough, chest tightness, shortness of breath and wheezing.    Cardiovascular: Positive for leg swelling. Negative for chest pain and palpitations.   Gastrointestinal: Negative.  Negative for abdominal pain, diarrhea, nausea and vomiting.   Endocrine: Negative.  Negative for polydipsia, polyphagia and polyuria.   Genitourinary: Negative.  Negative for dysuria, frequency and urgency.   Musculoskeletal: Negative.  Negative for arthralgias, back pain and myalgias.   Skin: Negative.  Negative for color  change and rash.   Allergic/Immunologic: Negative.    Neurological: Negative.  Negative for dizziness, weakness, light-headedness, numbness and headaches.   Hematological: Negative.  Negative for adenopathy. Does not bruise/bleed easily.   Psychiatric/Behavioral: Negative.    All other systems reviewed and are negative.      Objective     Vitals:    11/16/20 1307   BP: 162/58   Pulse: 86   Temp: 97.9 °F (36.6 °C)   SpO2: 98%       Physical Exam  Vitals signs and nursing note reviewed.   Constitutional:       Appearance: He is well-developed. He is obese. He is not diaphoretic.   HENT:      Head: Normocephalic and atraumatic.      Right Ear: Tympanic membrane and ear canal normal.      Left Ear: Tympanic membrane and ear canal normal.      Nose: Nose normal. No congestion.      Mouth/Throat:      Mouth: Mucous membranes are moist.      Pharynx: Oropharynx is clear.   Eyes:      Conjunctiva/sclera: Conjunctivae normal.      Pupils: Pupils are equal, round, and reactive to light.   Neck:      Musculoskeletal: Neck supple.      Thyroid: No thyromegaly.      Vascular: No JVD.   Cardiovascular:      Rate and Rhythm: Normal rate and regular rhythm.      Pulses: Normal pulses.      Heart sounds: Normal heart sounds. No murmur.   Pulmonary:      Effort: Pulmonary effort is normal. No respiratory distress.      Breath sounds: Wheezing and rhonchi present. No rales.   Abdominal:      General: Bowel sounds are normal.      Palpations: Abdomen is soft.      Tenderness: There is no abdominal tenderness.   Musculoskeletal:      Right lower leg: Edema present.      Left lower leg: Edema present.   Lymphadenopathy:      Cervical: No cervical adenopathy.   Skin:     General: Skin is warm and dry.      Findings: No rash.   Neurological:      General: No focal deficit present.      Mental Status: He is alert and oriented to person, place, and time.      Sensory: No sensory deficit.   Psychiatric:         Mood and Affect: Mood normal.          Behavior: Behavior normal.         Assessment/Plan   Problems Addressed this Visit     None      Visit Diagnoses     Cough    -  Primary    Relevant Medications    promethazine-codeine (PHENERGAN with CODEINE) 6.25-10 MG/5ML syrup    Acute bronchitis, unspecified organism        Relevant Medications    promethazine-codeine (PHENERGAN with CODEINE) 6.25-10 MG/5ML syrup      Diagnoses       Codes Comments    Cough    -  Primary ICD-10-CM: R05  ICD-9-CM: 786.2     Acute bronchitis, unspecified organism     ICD-10-CM: J20.9  ICD-9-CM: 466.0

## 2020-12-07 NOTE — TELEPHONE ENCOUNTER
Caller: Lerner Javier MONIK    Relationship: Self    Best call back number: [phone]    Medication needed:   Requested Prescriptions     Pending Prescriptions Disp Refills   • simvastatin (ZOCOR) 20 MG tablet        Sig: Take 1 tablet by mouth Every Night.   • lisinopril (PRINIVIL,ZESTRIL) 30 MG tablet        Sig: Take 1 tablet by mouth Daily.   • metoprolol succinate XL (TOPROL-XL) 25 MG 24 hr tablet 90 tablet 3     Sig: Take 1 tablet by mouth Daily.   • fenofibrate 160 MG tablet        Sig: Take 1 tablet by mouth Every Morning.   • amLODIPine (NORVASC) 10 MG tablet        Sig: Take 0.5 tablets by mouth 2 (Two) Times a Day.       When do you need the refill by: 12/7/20    Does the patient have less than a 3 day supply:  [] Yes  [x] No    What is the patient's preferred pharmacy: [facility] - [address] [phone] [facility] [phone]

## 2020-12-16 RX ORDER — METOPROLOL SUCCINATE 25 MG/1
25 TABLET, EXTENDED RELEASE ORAL DAILY
Qty: 90 TABLET | Refills: 3 | Status: SHIPPED | OUTPATIENT
Start: 2020-12-16 | End: 2022-01-12 | Stop reason: SDUPTHER

## 2020-12-16 RX ORDER — FENOFIBRATE 160 MG/1
160 TABLET ORAL EVERY MORNING
Qty: 90 TABLET | Refills: 3 | Status: SHIPPED | OUTPATIENT
Start: 2020-12-16 | End: 2022-01-18 | Stop reason: SDUPTHER

## 2020-12-16 RX ORDER — AMLODIPINE BESYLATE 10 MG/1
5 TABLET ORAL 2 TIMES DAILY
Qty: 90 TABLET | Refills: 3 | Status: SHIPPED | OUTPATIENT
Start: 2020-12-16 | End: 2021-11-23 | Stop reason: ALTCHOICE

## 2020-12-16 RX ORDER — LISINOPRIL 30 MG/1
30 TABLET ORAL DAILY
Qty: 90 TABLET | Refills: 3 | Status: SHIPPED | OUTPATIENT
Start: 2020-12-16 | End: 2021-11-23 | Stop reason: ALTCHOICE

## 2020-12-16 RX ORDER — SIMVASTATIN 20 MG
20 TABLET ORAL NIGHTLY
Qty: 90 TABLET | Refills: 3 | Status: SHIPPED | OUTPATIENT
Start: 2020-12-16 | End: 2021-12-01

## 2021-02-22 RX ORDER — APIXABAN 5 MG/1
TABLET, FILM COATED ORAL
Qty: 180 TABLET | Refills: 3 | Status: SHIPPED | OUTPATIENT
Start: 2021-02-22 | End: 2022-02-17

## 2021-02-25 PROCEDURE — 93294 REM INTERROG EVL PM/LDLS PM: CPT | Performed by: INTERNAL MEDICINE

## 2021-02-25 PROCEDURE — 93296 REM INTERROG EVL PM/IDS: CPT | Performed by: INTERNAL MEDICINE

## 2021-05-18 ENCOUNTER — TELEPHONE (OUTPATIENT)
Dept: SLEEP MEDICINE | Facility: HOSPITAL | Age: 81
End: 2021-05-18

## 2021-05-19 ENCOUNTER — OFFICE VISIT (OUTPATIENT)
Dept: SLEEP MEDICINE | Facility: HOSPITAL | Age: 81
End: 2021-05-19

## 2021-05-19 VITALS
WEIGHT: 298.4 LBS | SYSTOLIC BLOOD PRESSURE: 160 MMHG | OXYGEN SATURATION: 96 % | DIASTOLIC BLOOD PRESSURE: 72 MMHG | HEART RATE: 77 BPM | HEIGHT: 71 IN | BODY MASS INDEX: 41.77 KG/M2

## 2021-05-19 DIAGNOSIS — G47.33 OSA (OBSTRUCTIVE SLEEP APNEA): Primary | ICD-10-CM

## 2021-05-19 PROCEDURE — 99213 OFFICE O/P EST LOW 20 MIN: CPT | Performed by: NURSE PRACTITIONER

## 2021-05-19 RX ORDER — MELATONIN
1000 DAILY
COMMUNITY
Start: 2021-05-16

## 2021-05-19 NOTE — PROGRESS NOTES
Chief Complaint:   Chief Complaint   Patient presents with   • Follow-up       HPI:    Javier Lerner is a 80 y.o. male here for follow-up of sleep apnea.  Patient was last seen 6/17/2020.  Patient has a machine that is 5+ years old and does not feel the pressure is as strong as it was in the beginning.  Patient is having difficulty with his DME of weight care and does wish to change to another provider.    Patient has a history of severe sinus bradycardia, hypertension, obesity, diabetes, CKD, dizziness and sleep apnea.  Patient does sleep 8 hours and feels rested upon awakening.  He will get up x1 in the night.  He usually goes to sleep very quickly and less having difficulty with his mask.  He would like to be refitted with a mask now that he will be starting with new DME.        Current medications are:   Current Outpatient Medications:   •  amLODIPine (NORVASC) 10 MG tablet, Take 0.5 tablets by mouth 2 (Two) Times a Day., Disp: 90 tablet, Rfl: 3  •  B-D UF III MINI PEN NEEDLES 31G X 5 MM misc, , Disp: , Rfl:   •  Eliquis 5 MG tablet tablet, TAKE 1 TABLET EVERY 12 HOURS, Disp: 180 tablet, Rfl: 3  •  fenofibrate 160 MG tablet, Take 1 tablet by mouth Every Morning., Disp: 90 tablet, Rfl: 3  •  furosemide (LASIX) 20 MG tablet, Take 20 mg by mouth 2 (Two) Times a Day., Disp: , Rfl:   •  Insulin Regular Human, Conc, (HumuLIN R U-500 KwikPen) 500 UNIT/ML solution pen-injector CONCENTRATED injection, Humulin R U-500 (Conc) Insulin Kwikpen 500 unit/mL (3 mL) subcutaneous  Inject 80 units before breakfast, lunch, and dinner E11.65, Disp: , Rfl:   •  lisinopril (PRINIVIL,ZESTRIL) 30 MG tablet, Take 1 tablet by mouth Daily., Disp: 90 tablet, Rfl: 3  •  metoprolol succinate XL (TOPROL-XL) 25 MG 24 hr tablet, Take 1 tablet by mouth Daily., Disp: 90 tablet, Rfl: 3  •  nitroglycerin (NITROSTAT) 0.4 MG SL tablet, Place 0.4 mg under the tongue Every 5 (Five) Minutes As Needed for chest pain. Take no more than 3 doses in  15 minutes., Disp: , Rfl:   •  OneTouch Verio test strip, , Disp: , Rfl:   •  promethazine-codeine (PHENERGAN with CODEINE) 6.25-10 MG/5ML syrup, Take 5 mL by mouth Every 4 (Four) Hours As Needed for Cough., Disp: 240 mL, Rfl: 1  •  simvastatin (ZOCOR) 20 MG tablet, Take 1 tablet by mouth Every Night., Disp: 90 tablet, Rfl: 3  •  TURMERIC PO, Take 1,000 mg by mouth Daily., Disp: , Rfl:   •  cholecalciferol (VITAMIN D3) 25 MCG (1000 UT) tablet, , Disp: , Rfl:   •  levoFLOXacin (LEVAQUIN) 500 MG tablet, Take 1 tablet by mouth Daily., Disp: 10 tablet, Rfl: 0.      The patient's relevant past medical, surgical, family and social history were reviewed and updated in Epic as appropriate.       Review of Systems   Constitutional: Positive for diaphoresis.   HENT: Positive for postnasal drip.    Eyes: Positive for visual disturbance.   Respiratory: Positive for apnea and shortness of breath.    Cardiovascular: Positive for chest pain and palpitations.   Endocrine: Positive for cold intolerance and heat intolerance.   Musculoskeletal: Positive for arthralgias, gait problem and joint swelling.   Neurological: Positive for headaches.   Psychiatric/Behavioral: Positive for dysphoric mood and sleep disturbance. The patient is nervous/anxious.    All other systems reviewed and are negative.        Objective:    Physical Exam  Constitutional:       Appearance: Normal appearance. He is obese.   HENT:      Head: Normocephalic and atraumatic.      Mouth/Throat:      Mouth: Mucous membranes are moist.      Pharynx: Oropharynx is clear.      Comments: Mallampati 2 anatomy  Eyes:      Conjunctiva/sclera: Conjunctivae normal.   Pulmonary:      Effort: Pulmonary effort is normal.      Breath sounds: Normal breath sounds.   Skin:     General: Skin is warm and dry.      Coloration: Skin is not pale.   Neurological:      Mental Status: He is alert and oriented to person, place, and time.   Psychiatric:         Mood and Affect: Mood normal.          Behavior: Behavior normal.         Thought Content: Thought content normal.         Judgment: Judgment normal.       30/30 days of use  Greater than 4-hour use 100%  EPAP 15  IPAP 15  AHI of 3.2    ASSESSMENT/PLAN    Diagnoses and all orders for this visit:    1. OMAIRA (obstructive sleep apnea) (Primary)  -     CPAP Therapy            1. Counseled patient regarding multimodal approach with healthy nutrition, healthy sleep, regular physical activity, social activities, counseling, and medications. Encouraged to practice lateral sleep position. Avoid alcohol and sedatives close to bedtime.  2.   We will switch patient to aero care and write an order for new machine with no setting changes I will see him back in 31 to 90 days.  I have reviewed the results of my evaluation and impression and discussed my recommendations in detail with the patient.      Signed by  Adela Posey, BOB    May 19, 2021      CC: Javier Kirkpatrick MD          No ref. provider found

## 2021-05-24 ENCOUNTER — TELEPHONE (OUTPATIENT)
Dept: SLEEP MEDICINE | Facility: HOSPITAL | Age: 81
End: 2021-05-24

## 2021-05-25 DIAGNOSIS — G47.33 OSA (OBSTRUCTIVE SLEEP APNEA): Primary | ICD-10-CM

## 2021-05-25 NOTE — TELEPHONE ENCOUNTER
I have discussed this with Mayco and he states these are the correct settings and apnea is controlled

## 2021-05-26 ENCOUNTER — TELEPHONE (OUTPATIENT)
Dept: SLEEP MEDICINE | Facility: HOSPITAL | Age: 81
End: 2021-05-26

## 2021-05-26 NOTE — TELEPHONE ENCOUNTER
Spoke with Emily, the patient has no diagnostic study since 2008.  If he wants a new machine, it will require a new diagnostic study.      If we want to conduct as a split-study (if insurance approves) then we could try and possibly fail CPAP all in one night.  Since he has been compliant this whole time, he could requalify with HST, however he would have to be given an auto CPAP first to try.   Since he is on BiPAP with the IPAP and EPAP pressures the same, he is essentially on CPAP now anyway.      So in summary, if he wants a new machine, it will require a new study.  If the new machine was just wanted so that he could have something new but the old one is still working, then it's his choice on whether he wants to undergo the additional testing for a new machine.

## 2021-05-26 NOTE — TELEPHONE ENCOUNTER
Did this patient want a new machine just to have a new machine, or was it because the old one is going bad?  The reason I bring this up is that he is going to require a new diagnostic study in order to get a new machine.  Furthermore, in order to qualify for a new BiPAP, he would have to have a CPAP trial (which could possibly be done on the same night as his diagnostic in-lab sleep study) to prove that CPAP is ineffective or failed (if either apply).  So, if he needs new equipment you might as well order the sleep study

## 2021-05-26 NOTE — TELEPHONE ENCOUNTER
Can you look at mayda that I asked about yesterday and call and explain to mario xavier why we left like this ?

## 2021-05-27 DIAGNOSIS — G47.33 OSA (OBSTRUCTIVE SLEEP APNEA): Primary | ICD-10-CM

## 2021-05-27 PROCEDURE — 93294 REM INTERROG EVL PM/LDLS PM: CPT | Performed by: INTERNAL MEDICINE

## 2021-05-27 PROCEDURE — 93296 REM INTERROG EVL PM/IDS: CPT | Performed by: INTERNAL MEDICINE

## 2021-05-27 NOTE — TELEPHONE ENCOUNTER
After talking with Mayco since pt needs a new machine he has to have a new study, they will adjust pressures that night as needed

## 2021-06-09 ENCOUNTER — APPOINTMENT (OUTPATIENT)
Dept: PREADMISSION TESTING | Facility: HOSPITAL | Age: 81
End: 2021-06-09

## 2021-06-09 PROCEDURE — U0005 INFEC AGEN DETEC AMPLI PROBE: HCPCS

## 2021-06-09 PROCEDURE — C9803 HOPD COVID-19 SPEC COLLECT: HCPCS

## 2021-06-09 PROCEDURE — U0004 COV-19 TEST NON-CDC HGH THRU: HCPCS

## 2021-06-10 LAB — SARS-COV-2 RNA PNL SPEC NAA+PROBE: NOT DETECTED

## 2021-06-12 ENCOUNTER — HOSPITAL ENCOUNTER (OUTPATIENT)
Dept: SLEEP MEDICINE | Facility: HOSPITAL | Age: 81
Discharge: HOME | End: 2021-06-12
Admitting: NURSE PRACTITIONER

## 2021-06-12 VITALS — HEIGHT: 71 IN | BODY MASS INDEX: 42.22 KG/M2 | WEIGHT: 301.59 LBS | OXYGEN SATURATION: 96 % | HEART RATE: 85 BPM

## 2021-06-12 DIAGNOSIS — G47.33 OSA (OBSTRUCTIVE SLEEP APNEA): ICD-10-CM

## 2021-06-12 PROCEDURE — 95810 POLYSOM 6/> YRS 4/> PARAM: CPT | Performed by: INTERNAL MEDICINE

## 2021-06-12 PROCEDURE — 95810 POLYSOM 6/> YRS 4/> PARAM: CPT

## 2021-06-17 ENCOUNTER — TELEPHONE (OUTPATIENT)
Dept: SLEEP MEDICINE | Facility: HOSPITAL | Age: 81
End: 2021-06-17

## 2021-06-17 DIAGNOSIS — G47.33 OSA (OBSTRUCTIVE SLEEP APNEA): Primary | ICD-10-CM

## 2021-06-17 NOTE — TELEPHONE ENCOUNTER
ELYSIA SYLVESTER    PER YFN, PATIENT said that he uses a Bipap and the order is for a Cpap.  Does this need to be updated?

## 2021-06-18 DIAGNOSIS — G47.33 OSA (OBSTRUCTIVE SLEEP APNEA): Primary | ICD-10-CM

## 2021-06-23 ENCOUNTER — OFFICE VISIT (OUTPATIENT)
Dept: CARDIOLOGY | Facility: CLINIC | Age: 81
End: 2021-06-23

## 2021-06-23 VITALS
HEART RATE: 77 BPM | BODY MASS INDEX: 39.2 KG/M2 | OXYGEN SATURATION: 97 % | SYSTOLIC BLOOD PRESSURE: 150 MMHG | WEIGHT: 280 LBS | HEIGHT: 71 IN | DIASTOLIC BLOOD PRESSURE: 70 MMHG

## 2021-06-23 DIAGNOSIS — E11.65 TYPE 2 DIABETES MELLITUS WITH HYPERGLYCEMIA, WITHOUT LONG-TERM CURRENT USE OF INSULIN (HCC): ICD-10-CM

## 2021-06-23 DIAGNOSIS — E78.2 MIXED HYPERLIPIDEMIA: ICD-10-CM

## 2021-06-23 DIAGNOSIS — I10 ESSENTIAL HYPERTENSION: ICD-10-CM

## 2021-06-23 DIAGNOSIS — R06.09 DOE (DYSPNEA ON EXERTION): Primary | ICD-10-CM

## 2021-06-23 PROBLEM — E11.9 TYPE 2 DIABETES MELLITUS: Status: ACTIVE | Noted: 2019-06-25

## 2021-06-23 PROCEDURE — 99214 OFFICE O/P EST MOD 30 MIN: CPT | Performed by: INTERNAL MEDICINE

## 2021-06-23 RX ORDER — NITROGLYCERIN 0.4 MG/1
0.4 TABLET SUBLINGUAL
Qty: 100 TABLET | Refills: 1 | Status: SHIPPED | OUTPATIENT
Start: 2021-06-23

## 2021-06-23 NOTE — PROGRESS NOTES
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  Motor homes to [facility]    PROBLEM LIST:  1. CAD  1. MPS 10/16: normal perfusion with no evidence of ischemia, EF 67%.  2. 9/20 Lexiscan wnl w diaphragm attn EF 60%  2. Symptomatic bradycardia  1. Hesselson: holter avg rate mid 40's and planned PPM in January  2. Echo 10/16: LVEF >70, Anatomically and functionally normal valves  3. BTK ppm 1/17- abh lower rate 70  3. HTN  4. HLD  1. 7/18/2018   HDL 30 LDL 46  2. 12/12/2019   HDL 29.8 LDL 46  5. Chronic fatigue  6. CKD, stage 3, GFR 30-59   1. 1/17 creat 1.7  2. 12/19 CR 1.68  7. Obesity  8. DMT2  9. A1c 9-7.2 2017  10. OMAIRA with CPAP  11. Tobacco use - cessation 3 years ago  12. PAF  1. [id number] 4, a/c w Xarelto       Chief Complaint   Patient presents with   • Follow-up       Allergies  Allergies   Allergen Reactions   • Penicillins Other (See Comments)     WELTS ON HANDS AND TURN PURPLE   • Tetracyclines & Related      Blisters on skin       Current Medications    Current Outpatient Medications:   •  amLODIPine (NORVASC) 10 MG tablet, Take 0.5 tablets by mouth 2 (Two) Times a Day., Disp: 90 tablet, Rfl: 3  •  B-D UF III MINI PEN NEEDLES 31G X 5 MM misc, , Disp: , Rfl:   •  cholecalciferol (VITAMIN D3) 25 MCG (1000 UT) tablet, , Disp: , Rfl:   •  Eliquis 5 MG tablet tablet, TAKE 1 TABLET EVERY 12 HOURS, Disp: 180 tablet, Rfl: 3  •  Furosemide (LASIX PO), Take 20 mg of amoxicillin by mouth As Needed (take as needed when 2lb weight gain in 24 hrs)., Disp: , Rfl:   •  Insulin Regular Human, Conc, (HumuLIN R U-500 KwikPen) 500 UNIT/ML solution pen-injector CONCENTRATED injection, Humulin R U-500  "(Conc) Insulin Kwikpen 500 unit/mL (3 mL) subcutaneous  Inject 80 units before breakfast, lunch, and dinner E11.65, Disp: , Rfl:   •  lisinopril (PRINIVIL,ZESTRIL) 30 MG tablet, Take 1 tablet by mouth Daily., Disp: 90 tablet, Rfl: 3  •  metoprolol succinate XL (TOPROL-XL) 25 MG 24 hr tablet, Take 1 tablet by mouth Daily., Disp: 90 tablet, Rfl: 3  •  nitroglycerin (NITROSTAT) 0.4 MG SL tablet, Place 0.4 mg under the tongue Every 5 (Five) Minutes As Needed for chest pain. Take no more than 3 doses in 15 minutes., Disp: , Rfl:   •  OneTouch Verio test strip, , Disp: , Rfl:   •  simvastatin (ZOCOR) 20 MG tablet, Take 1 tablet by mouth Every Night., Disp: 90 tablet, Rfl: 3  •  fenofibrate 160 MG tablet, Take 1 tablet by mouth Every Morning., Disp: 90 tablet, Rfl: 3  •  furosemide (LASIX) 20 MG tablet, Take 20 mg by mouth 2 (Two) Times a Day., Disp: , Rfl:   •  levoFLOXacin (LEVAQUIN) 500 MG tablet, Take 1 tablet by mouth Daily., Disp: 10 tablet, Rfl: 0  •  promethazine-codeine (PHENERGAN with CODEINE) 6.25-10 MG/5ML syrup, Take 5 mL by mouth Every 4 (Four) Hours As Needed for Cough., Disp: 240 mL, Rfl: 1      History of Present Illness   Javier Lerner is a 80 y.o. year old male here for follow up.  He is largely been staying at home.  He is attempting intermittent fasting and is doing well with such.  He notes no provocative chest symptoms        OBJECTIVE:  Vitals:    06/23/21 0945   BP: 150/70   Pulse: 77   SpO2: 97%   Weight: 127 kg (280 lb)   Height: 180.3 cm (71\")     Physical Exam  Constitutional:       Appearance: He is well-developed.   Neck:      Thyroid: No thyromegaly.      Vascular: No carotid bruit, hepatojugular reflux or JVD.      Trachea: No tracheal deviation.   Cardiovascular:      Rate and Rhythm: Normal rate and regular rhythm.      Chest Wall: PMI is not displaced.      Pulses: Normal pulses and intact distal pulses. No midsystolic click and no opening snap.           Radial pulses are 2+ on the " right side and 2+ on the left side.        Dorsalis pedis pulses are 2+ on the right side and 2+ on the left side.        Posterior tibial pulses are 2+ on the right side and 2+ on the left side.      Heart sounds: S1 normal and S2 normal. Heart sounds not distant. Murmur heard.   No friction rub. No gallop.    Pulmonary:      Effort: Pulmonary effort is normal.      Breath sounds: Normal breath sounds. No wheezing or rales.   Abdominal:      General: Bowel sounds are normal.      Palpations: Abdomen is soft. There is no mass.      Tenderness: There is no abdominal tenderness. There is no guarding.      Comments: Protuberant obese abdomen   Musculoskeletal:      Cervical back: Normal range of motion and neck supple.         Diagnostic Data:  Procedures      ASSESSMENT:   Diagnosis Plan   1. HERNÁNDEZ (dyspnea on exertion)     2. Type 2 diabetes mellitus with hyperglycemia, without long-term current use of insulin (CMS/[facility])     3. Mixed hyperlipidemia     4. Essential hypertension         PLAN:  Dyspnea multifactorial historically with diastolic dysfunction no recent ischemic evaluation.  Low threshold for noninvasive ischemic evaluation with any crescendo symptoms.  Patient would be high risk for contrast-induced nephropathy with any iodine based imaging.    Diabetes on oral and injectable agents continuing to transition as A1c not ideal.  Re-counseled need to comply with a low carbohydrate intake and intermittent fasting    Dyslipidemia on statin and fibrate therapy    Hypertension controlled lisinopril metoprolol amlodipine    CKD stage III followed per [facility]    Javier Kirkpatrick MD, thank you for referring Mr. Lerner for evaluation.  I have forwarded my electronically generated recommendations to you for review.  Please do not hesitate to call with any questions.      Prashant King MD, [facility]

## 2021-08-03 ENCOUNTER — OFFICE VISIT (OUTPATIENT)
Dept: SLEEP MEDICINE | Facility: HOSPITAL | Age: 81
End: 2021-08-03

## 2021-08-03 VITALS
DIASTOLIC BLOOD PRESSURE: 72 MMHG | BODY MASS INDEX: 42.52 KG/M2 | OXYGEN SATURATION: 95 % | SYSTOLIC BLOOD PRESSURE: 135 MMHG | RESPIRATION RATE: 18 BRPM | HEART RATE: 84 BPM | TEMPERATURE: 97.7 F | WEIGHT: 297 LBS | HEIGHT: 70 IN

## 2021-08-03 DIAGNOSIS — G47.34 NOCTURNAL HYPOXEMIA: ICD-10-CM

## 2021-08-03 DIAGNOSIS — G47.33 OSA (OBSTRUCTIVE SLEEP APNEA): Primary | ICD-10-CM

## 2021-08-03 PROCEDURE — 99213 OFFICE O/P EST LOW 20 MIN: CPT | Performed by: NURSE PRACTITIONER

## 2021-08-03 NOTE — PROGRESS NOTES
Chief Complaint:   Chief Complaint   Patient presents with   • Follow-up       HPI:    Javier Lerner is a 80 y.o. male here for follow-up of sleep apnea.  Patient was last seen 5/19/2021 and did need a new machine.  He did need a sleep study to qualify and this was done 6/12/2021 showed severe obstructive sleep apnea as well as nocturnal hypoxemia.  Patient states he is doing well with his new machine and does feel he is getting the air that he needs.  Patient is sleeping 8 hours nightly and feels rested upon awakening.  He goes to sleep quickly and will get up x1 to use the restroom.  Patient has an Sharon score 7/24.  Patient has no concerns or complaints regarding CPAP and wishes to continue therapy.        Current medications are:   Current Outpatient Medications:   •  amLODIPine (NORVASC) 10 MG tablet, Take 0.5 tablets by mouth 2 (Two) Times a Day., Disp: 90 tablet, Rfl: 3  •  B-D UF III MINI PEN NEEDLES 31G X 5 MM misc, , Disp: , Rfl:   •  cholecalciferol (VITAMIN D3) 25 MCG (1000 UT) tablet, , Disp: , Rfl:   •  Eliquis 5 MG tablet tablet, TAKE 1 TABLET EVERY 12 HOURS, Disp: 180 tablet, Rfl: 3  •  fenofibrate 160 MG tablet, Take 1 tablet by mouth Every Morning., Disp: 90 tablet, Rfl: 3  •  furosemide (LASIX) 20 MG tablet, Take 20 mg by mouth 2 (Two) Times a Day., Disp: , Rfl:   •  glucose blood test strip, OneTouch Verio test strips  USE TO TEST BLOOD GLUCOSE 3 TIMES DAILY e11.65, Disp: , Rfl:   •  Insulin Regular Human, Conc, (HumuLIN R U-500 KwikPen) 500 UNIT/ML solution pen-injector CONCENTRATED injection, Humulin R U-500 (Conc) Insulin Kwikpen 500 unit/mL (3 mL) subcutaneous  Inject 80 units before breakfast, lunch, and dinner E11.65, Disp: , Rfl:   •  lisinopril (PRINIVIL,ZESTRIL) 30 MG tablet, Take 1 tablet by mouth Daily., Disp: 90 tablet, Rfl: 3  •  metoprolol succinate XL (TOPROL-XL) 25 MG 24 hr tablet, Take 1 tablet by mouth Daily., Disp: 90 tablet, Rfl: 3  •  nitroglycerin (NITROSTAT) 0.4  MG SL tablet, Place 1 tablet under the tongue Every 5 (Five) Minutes As Needed for Chest Pain. Take no more than 3 doses in 15 minutes., Disp: 100 tablet, Rfl: 1  •  OneTouch Verio test strip, , Disp: , Rfl:   •  simvastatin (ZOCOR) 20 MG tablet, Take 1 tablet by mouth Every Night., Disp: 90 tablet, Rfl: 3  •  Furosemide (LASIX PO), Take 20 mg of amoxicillin by mouth As Needed (take as needed when 2lb weight gain in 24 hrs)., Disp: , Rfl:   •  levoFLOXacin (LEVAQUIN) 500 MG tablet, Take 1 tablet by mouth Daily., Disp: 10 tablet, Rfl: 0  •  promethazine-codeine (PHENERGAN with CODEINE) 6.25-10 MG/5ML syrup, Take 5 mL by mouth Every 4 (Four) Hours As Needed for Cough., Disp: 240 mL, Rfl: 1.      The patient's relevant past medical, surgical, family and social history were reviewed and updated in Epic as appropriate.       Review of Systems   Constitutional: Positive for diaphoresis.   HENT: Positive for hearing loss and postnasal drip.    Eyes: Positive for visual disturbance.   Respiratory: Positive for apnea and shortness of breath.    Cardiovascular: Positive for chest pain and palpitations.   Endocrine: Positive for cold intolerance and heat intolerance.   Musculoskeletal: Positive for arthralgias, gait problem and joint swelling.   Neurological: Positive for headaches.   Psychiatric/Behavioral: Positive for dysphoric mood and sleep disturbance. The patient is nervous/anxious.    All other systems reviewed and are negative.        Objective:    Physical Exam  Constitutional:       Appearance: Normal appearance. He is obese.   HENT:      Head: Normocephalic and atraumatic.      Mouth/Throat:      Mouth: Mucous membranes are moist.      Pharynx: Oropharynx is clear.      Comments: Mallampati 2 anatomy  Pulmonary:      Effort: Pulmonary effort is normal. No respiratory distress.      Breath sounds: Normal breath sounds.   Skin:     General: Skin is warm and dry.   Neurological:      Mental Status: He is alert and  oriented to person, place, and time.   Psychiatric:         Mood and Affect: Mood normal.         Behavior: Behavior normal.         Thought Content: Thought content normal.         Judgment: Judgment normal.     30/30 days of use  Greater than 4-hour use 100%  95th percentile pressure 16.7  Settings 8-18  AHI 5.8      ASSESSMENT/PLAN    Diagnoses and all orders for this visit:    1. OMAIRA (obstructive sleep apnea) (Primary)  -     Overnight Sleep Oximetry Study; Future    2. Nocturnal hypoxemia  -     Overnight Sleep Oximetry Study; Future            1. Counseled patient regarding multimodal approach with healthy nutrition, healthy sleep, regular physical activity, social activities, counseling, and medications. Encouraged to practice lateral sleep position. Avoid alcohol and sedatives close to bedtime.  2. Refill supplies x1 year.  Patient to have overnight oximetry while wearing CPAP and he will be called with these results.  I will see patient back in 1 year    I have reviewed the results of my evaluation and impression and discussed my recommendations in detail with the patient.      Signed by  BOB Gomez    August 3, 2021      CC: Javier Kirkpatrick MD          No ref. provider found

## 2021-08-10 DIAGNOSIS — G47.34 NOCTURNAL HYPOXEMIA: ICD-10-CM

## 2021-08-10 DIAGNOSIS — G47.33 OSA (OBSTRUCTIVE SLEEP APNEA): ICD-10-CM

## 2021-08-16 ENCOUNTER — TELEPHONE (OUTPATIENT)
Dept: SLEEP MEDICINE | Facility: HOSPITAL | Age: 81
End: 2021-08-16

## 2021-08-26 PROCEDURE — 93296 REM INTERROG EVL PM/IDS: CPT | Performed by: INTERNAL MEDICINE

## 2021-08-26 PROCEDURE — 93294 REM INTERROG EVL PM/LDLS PM: CPT | Performed by: INTERNAL MEDICINE

## 2021-09-20 ENCOUNTER — OFFICE VISIT (OUTPATIENT)
Dept: CARDIOLOGY | Facility: CLINIC | Age: 81
End: 2021-09-20

## 2021-09-20 VITALS
BODY MASS INDEX: 43.23 KG/M2 | SYSTOLIC BLOOD PRESSURE: 152 MMHG | WEIGHT: 302 LBS | OXYGEN SATURATION: 95 % | HEART RATE: 60 BPM | HEIGHT: 70 IN | DIASTOLIC BLOOD PRESSURE: 62 MMHG

## 2021-09-20 DIAGNOSIS — I10 ESSENTIAL HYPERTENSION: ICD-10-CM

## 2021-09-20 DIAGNOSIS — R00.1 SEVERE SINUS BRADYCARDIA: Primary | ICD-10-CM

## 2021-09-20 DIAGNOSIS — G47.33 OSA (OBSTRUCTIVE SLEEP APNEA): ICD-10-CM

## 2021-09-20 DIAGNOSIS — I45.89 CHRONOTROPIC INCOMPETENCE: ICD-10-CM

## 2021-09-20 PROCEDURE — 93280 PM DEVICE PROGR EVAL DUAL: CPT | Performed by: INTERNAL MEDICINE

## 2021-09-20 PROCEDURE — 99213 OFFICE O/P EST LOW 20 MIN: CPT | Performed by: INTERNAL MEDICINE

## 2021-09-20 NOTE — PROGRESS NOTES
Cardiac Electrophysiology Outpatient Follow Up Note            [facility]    Follow Up Office Visit      Javier Lerner  [id number]  09/20/2021  [unfilled]  [unfilled]    Primary Care Physician: Javier Kirkpatrick MD    Referred By: No ref. provider found    Subjective     Chief Complaint:   Diagnoses and all orders for this visit:    1. Severe sinus bradycardia (Primary)    2. Chronotropic incompetence    3. OMAIRA (obstructive sleep apnea)    4. Essential hypertension      Chief Complaint   Patient presents with   • Severe sinus bradycardia       History of Present Illness:   Javier Lerner is a 80 y.o. male who presents to my electrophysiology clinic for follow up of above complaints.  No chest pain nausea vomit fevers chills syncope presyncope.  No new issues or complaints.      Review of Systems:   Constitutional: No fevers or chills, no recent weight gain or weight loss or fatigue  Eyes: No visual loss, blurred vision, double vision, yellow sclerae.  ENT: No headaches, hearing loss, vertigo, congestion or sore throat.   Cardiovascular: Per HPI  Respiratory: No cough or wheezing, no sputum production, no hematemesis   Gastrointestinal: No abdominal pain, no nausea, vomiting, constipation, diarrhea, melena.   Genitourinary: No dysuria, hematuria or increased frequency.  Musculoskeletal:  No gait disturbance, weakness or joint pain or stiffness  Integumentary: No rashes, urticaria, ulcers or sores.   Neurological: No headache, dizziness, syncope, paralysis, ataxia, no prior CVA/TIA  Psychiatric: No anxiety, or depression  Endocrine: No diaphoresis, cold or heat intolerance. No polyuria or polydipsia.   Hematologic/Lymphatic: No anemia, abnormal bruising or bleeding. No history of DVT/PE.      Past Medical History:   Past Medical History:   Diagnosis Date   • Arthritis    • Bradycardia    • Cataract    • Cholelithiasis    • Chronic fatigue  2016   • CKD (chronic kidney disease) stage 3, GFR 30-59 ml/min (CMS/[facility]ly [facility])    • Colon polyp    • Diabetes mellitus (CMS/HCC)     DX. , FSBS 1 X DAY   • Enlarged prostate    • GERD (gastroesophageal reflux disease)    • Heartburn    • HL (hearing loss)    • Hyperlipidemia    • Hypertension    • Liver disease    • Myocardial infarction (CMS/[facility]ly [facility])    • Obesity, Class II, BMI 35-39.9, with comorbidity    • OMAIRA (obstructive sleep apnea)    • Pacemaker    • Pancreatitis     HISTORY OF   • Wears glasses        Past Surgical History:   Past Surgical History:   Procedure Laterality Date   • ANKLE SURGERY Right    • APPENDECTOMY     • CARDIAC ELECTROPHYSIOLOGY PROCEDURE N/A 2017    Procedure: Pacemaker DC new;  Surgeon: Bryson Leon MD;  Location: [facility] INVASIVE LOCATION;  Service:    • CATARACT EXTRACTION, BILATERAL     • CHOLECYSTECTOMY     • COLONOSCOPY     • HERNIA REPAIR      UMBILICAL    • PACEMAKER IMPLANTATION  2017    PER DR. LEON       Family History:   Family History   Problem Relation Age of Onset   • Heart disease Mother    • Diabetes Father    • Hypertension Father    • Heart disease Father    • Sleep apnea Father        Social History:   Social History     Socioeconomic History   • Marital status:      Spouse name: Not on file   • Number of children: Not on file   • Years of education: Not on file   • Highest education level: Not on file   Tobacco Use   • Smoking status: Former Smoker     Packs/day: 0.50     Years: 60.00     Pack years: 30.00     Types: Cigarettes, Pipe     Quit date: 2014     Years since quittin.7   • Smokeless tobacco: Never Used   Vaping Use   • Vaping Use: Never used   Substance and Sexual Activity   • Alcohol use: No   • Drug use: No   • Sexual activity: Defer       Medications:     Current Outpatient Medications:   •  amLODIPine (NORVASC) 10 MG tablet, Take 0.5 tablets by mouth 2 (Two) Times a Day., Disp: 90 tablet, Rfl: 3  •  B-D UF III MINI  "PEN NEEDLES 31G X 5 MM misc, , Disp: , Rfl:   •  cholecalciferol (VITAMIN D3) 25 MCG (1000 UT) tablet, , Disp: , Rfl:   •  Eliquis 5 MG tablet tablet, TAKE 1 TABLET EVERY 12 HOURS, Disp: 180 tablet, Rfl: 3  •  fenofibrate 160 MG tablet, Take 1 tablet by mouth Every Morning., Disp: 90 tablet, Rfl: 3  •  Furosemide (LASIX PO), Take 20 mg of amoxicillin by mouth As Needed (take as needed when 2lb weight gain in 24 hrs)., Disp: , Rfl:   •  glucose blood test strip, OneTouch Verio test strips  USE TO TEST BLOOD GLUCOSE 3 TIMES DAILY e11.65, Disp: , Rfl:   •  Insulin Regular Human, Conc, (HumuLIN R U-500 KwikPen) 500 UNIT/ML solution pen-injector CONCENTRATED injection, Humulin R U-500 (Conc) Insulin Kwikpen 500 unit/mL (3 mL) subcutaneous  Inject 80 units before breakfast, lunch, and dinner E11.65, Disp: , Rfl:   •  lisinopril (PRINIVIL,ZESTRIL) 30 MG tablet, Take 1 tablet by mouth Daily., Disp: 90 tablet, Rfl: 3  •  metoprolol succinate XL (TOPROL-XL) 25 MG 24 hr tablet, Take 1 tablet by mouth Daily., Disp: 90 tablet, Rfl: 3  •  nitroglycerin (NITROSTAT) 0.4 MG SL tablet, Place 1 tablet under the tongue Every 5 (Five) Minutes As Needed for Chest Pain. Take no more than 3 doses in 15 minutes., Disp: 100 tablet, Rfl: 1  •  OneTouch Verio test strip, , Disp: , Rfl:   •  simvastatin (ZOCOR) 20 MG tablet, Take 1 tablet by mouth Every Night., Disp: 90 tablet, Rfl: 3  •  furosemide (LASIX) 20 MG tablet, Take 20 mg by mouth 2 (Two) Times a Day., Disp: , Rfl:   •  levoFLOXacin (LEVAQUIN) 500 MG tablet, Take 1 tablet by mouth Daily., Disp: 10 tablet, Rfl: 0    Allergies:   Allergies   Allergen Reactions   • Penicillins Other (See Comments)     WELTS ON HANDS AND TURN PURPLE   • Tetracyclines & Related      Blisters on skin       Objective   Vital Signs:   Vitals:    09/20/21 1436   BP: 152/62   BP Location: Left arm   Patient Position: Sitting   Pulse: 60   SpO2: 95%   Weight: (!) 137 kg (302 lb)   Height: 177.8 cm (70\") " "      PHYSICAL EXAM  General appearance: Awake, alert, cooperative  Head: Normocephalic, without obvious abnormality, atraumatic  Eyes: Conjunctivae/corneas clear, EOMs intact  Neck: no adenopathy, no carotid bruit, no JVD and thyroid: not enlarged  Lungs: clear to auscultation bilaterally and no rhonchi or crackles\", ' symmetric  Heart: regular rate and rhythm, S1, S2 normal, no murmur, click, rub or gallop  Abdomen: Soft, non-tender, bowel sounds normal,  no organomegaly  Extremities: extremities normal, atraumatic, no cyanosis or edema  Skin: Skin color, turgor normal, no rashes or lesions  Neurologic: Grossly normal     Lab Results   Component Value Date    GLUCOSE 180 (H) 01/04/2017    CALCIUM 11.2 (H) 01/04/2017     01/04/2017    K 4.0 01/04/2017    CO2 26.0 01/04/2017     01/04/2017    BUN 19 01/04/2017    CREATININE 1.60 (H) 01/04/2017    EGFRIFNONA 42 (L) 01/04/2017    BCR 11.9 01/04/2017    ANIONGAP 10.0 01/04/2017     Lab Results   Component Value Date    WBC 7.24 01/04/2017    HGB 14.7 01/04/2017    HCT 41.6 01/04/2017    MCV 89.8 01/04/2017     01/04/2017     Lab Results   Component Value Date    INR 1.04 01/04/2017    PROTIME 11.3 01/04/2017     Lab Results   Component Value Date    TSH 1.811 09/20/2016       Cardiac Testing:     I personally viewed and interpreted the patient's EKG/Telemetry/lab data    Procedures    Tobacco Cessation: N/A  Obstructive Sleep Apnea Screening: Completed    Assessment & Plan    Diagnoses and all orders for this visit:    1. Severe sinus bradycardia (Primary)    2. Chronotropic incompetence    3. OMAIRA (obstructive sleep apnea)    4. Essential hypertension         Diagnosis Plan   1. Severe sinus bradycardia   permanent pacemaker in situ.  Normal device function appropriate pacing sensing impedance values.  Chronotropic competency has been restored.      This patient's Cardiac Implanted Electronic Device was manually interrogated and reprogrammed during " the patient encounter today.  Iterative programming changes were manually made to determine the sensing threshold, pacing threshold, lead impedance as well as underlying cardiac rhythm.  These programming changes were not limited to but included some or all of the following when appropriate: pacing mode, programmed AV delays, blanking periods, and refractory periods.  Data obtained as a result of these manual programing changes informed the patient's CIED permanent programming.   2. Chronotropic incompetence   as above.   3. OMAIRA (obstructive sleep apnea)   well-controlled.   4. Essential hypertension   Per primary care physician.     Body mass index is 43.33 kg/m².    I spent 21 minutes in consultation with this patient which included more than 65% of this time in direct face-to-face counseling, physical examination and discussion of my assessment and findings and shared decision making with the patient.  The remainder of the time not spent face to face was performing one, some or all of the following actions:  preparing to see this patient ( eg. Review of tests),  ordering medications, tests or procedures ), care coordination, discussion of the plan with other healthcare providers, documenting clinical information in Epic well as independently interpreting results and communicating results to patient, family and or caregiver.  All time noted occurred on the date of service.    Follow Up:       Thank you for allowing me to participate in the care of your patient. Please to not hesitate to contact me with additional questions or concerns.      Ish Sewell DO, FACC, RS  Cardiac Electrophysiologist  [facility] / [facility]

## 2021-11-23 ENCOUNTER — OFFICE VISIT (OUTPATIENT)
Dept: FAMILY MEDICINE CLINIC | Facility: CLINIC | Age: 81
End: 2021-11-23

## 2021-11-23 VITALS
RESPIRATION RATE: 22 BRPM | WEIGHT: 301 LBS | HEART RATE: 111 BPM | TEMPERATURE: 97.3 F | BODY MASS INDEX: 43.09 KG/M2 | DIASTOLIC BLOOD PRESSURE: 82 MMHG | HEIGHT: 70 IN | SYSTOLIC BLOOD PRESSURE: 180 MMHG | OXYGEN SATURATION: 96 %

## 2021-11-23 DIAGNOSIS — N41.0 ACUTE PROSTATITIS: ICD-10-CM

## 2021-11-23 DIAGNOSIS — J20.9 ACUTE BRONCHITIS, UNSPECIFIED ORGANISM: ICD-10-CM

## 2021-11-23 DIAGNOSIS — Z79.4 DIABETES MELLITUS WITH INSULIN THERAPY (HCC): Primary | ICD-10-CM

## 2021-11-23 DIAGNOSIS — E11.9 DIABETES MELLITUS WITH INSULIN THERAPY (HCC): Primary | ICD-10-CM

## 2021-11-23 LAB
EXPIRATION DATE: ABNORMAL
HBA1C MFR BLD: 7.3 %
Lab: ABNORMAL

## 2021-11-23 PROCEDURE — 83036 HEMOGLOBIN GLYCOSYLATED A1C: CPT | Performed by: FAMILY MEDICINE

## 2021-11-23 PROCEDURE — 99214 OFFICE O/P EST MOD 30 MIN: CPT | Performed by: FAMILY MEDICINE

## 2021-11-23 RX ORDER — PROMETHAZINE HYDROCHLORIDE AND CODEINE PHOSPHATE 6.25; 1 MG/5ML; MG/5ML
5 SYRUP ORAL EVERY 6 HOURS PRN
Qty: 240 ML | Refills: 0 | Status: SHIPPED | OUTPATIENT
Start: 2021-11-23 | End: 2022-01-12

## 2021-11-23 RX ORDER — ALFUZOSIN HYDROCHLORIDE 10 MG/1
10 TABLET, EXTENDED RELEASE ORAL DAILY
COMMUNITY

## 2021-11-23 RX ORDER — LEVOFLOXACIN 500 MG/1
500 TABLET, FILM COATED ORAL DAILY
Qty: 10 TABLET | Refills: 0 | Status: SHIPPED | OUTPATIENT
Start: 2021-11-23 | End: 2022-01-12

## 2021-11-23 RX ORDER — HYDRALAZINE HYDROCHLORIDE 50 MG/1
50 TABLET, FILM COATED ORAL 2 TIMES DAILY
COMMUNITY

## 2021-11-23 NOTE — PROGRESS NOTES
Subjective   Javier Lerner is a 80 y.o. male    Chief Complaint    Diabetes mellitus    History of Present Illness  The patient reports that his glucose has been fluctuating a great deal with highs above 300 mg/dL and low as far as down as 30 mg/dL. He has been seeing BOB Moreno at the [facility], but has never seen an endocrinologist over there. The patient said he went to [place] a couple of weeks ago and his blood sugar went down, and when it came back up in the afternoon it was 318 mg/dL. At 3:00 AM it was 30 to 50 mg/dL. The patient said he was told that he had not had any protein and was told him to eat some meat and eat some green vegetables immediately. In 4 hours his blood sugar was 146 mg/dL.    He reports he is urinating like a race horse. The patient went to Dr. Wood last week and told him he was urinating a lot. He was given Uroxatrol to help empty his bladder. The patient believes he has a prostate infection.     The patient said he was given a 10-day course of Levaquin for his bronchial issues on 11/16/2020 which knocked it out.     The following portions of the patient's history were reviewed and updated as appropriate: allergies, current medications, past social history and problem list    Review of Systems   Constitutional: Negative for appetite change, chills, diaphoresis, fatigue, fever and unexpected weight change.   HENT: Negative.    Eyes: Negative for visual disturbance.   Respiratory: Positive for cough, chest tightness and wheezing. Negative for shortness of breath.    Cardiovascular: Negative for chest pain, palpitations and leg swelling.   Gastrointestinal: Positive for abdominal pain. Negative for diarrhea, nausea and vomiting.   Endocrine: Negative for polydipsia, polyphagia and polyuria.   Genitourinary: Positive for difficulty urinating, dysuria, frequency and urgency. Negative for hematuria.   Skin: Negative for color change.   Neurological: Negative for  dizziness, weakness, light-headedness and numbness.       Objective     Vitals:    11/23/21 1447   BP: 180/82   Pulse: 111   Resp: 22   Temp: 97.3 °F (36.3 °C)   SpO2: 96%       Physical Exam  Vitals and nursing note reviewed.   Constitutional:       General: He is not in acute distress.     Appearance: He is well-developed. He is not diaphoretic.   HENT:      Head: Normocephalic and atraumatic.      Nose: Nose normal.   Neck:      Thyroid: No thyromegaly.      Vascular: No JVD.   Cardiovascular:      Rate and Rhythm: Normal rate and regular rhythm.      Heart sounds: Normal heart sounds. No murmur heard.      Pulmonary:      Effort: Pulmonary effort is normal. No respiratory distress.      Breath sounds: No stridor. Wheezing present.   Abdominal:      General: Bowel sounds are normal. There is no distension.      Palpations: Abdomen is soft.      Tenderness: There is no abdominal tenderness. There is no guarding or rebound.   Musculoskeletal:      Cervical back: Neck supple.   Lymphadenopathy:      Cervical: No cervical adenopathy.   Skin:     General: Skin is warm and dry.   Neurological:      Sensory: No sensory deficit.     RESULTS:   His A1c today is 7.3 percent.    Assessment/Plan   Problems Addressed this Visit        Endocrine and Metabolic    Diabetes mellitus with insulin therapy (HCC) - Primary    Relevant Orders    POC Glycosylated Hemoglobin (Hb A1C) (Completed)      Other Visit Diagnoses     Acute bronchitis, unspecified organism        Relevant Medications    promethazine-codeine (PHENERGAN with CODEINE) 6.25-10 MG/5ML syrup    levoFLOXacin (LEVAQUIN) 500 MG tablet    Acute prostatitis          Diagnoses       Codes Comments    Diabetes mellitus with insulin therapy (HCC)    -  Primary ICD-10-CM: E11.9, Z79.4  ICD-9-CM: 250.00, V58.67     Acute bronchitis, unspecified organism     ICD-10-CM: J20.9  ICD-9-CM: 466.0     Acute prostatitis     ICD-10-CM: N41.0  ICD-9-CM: 601.0         I spent 30 minutes  in patient care: Reviewing records prior to the visit, examining the patient, entering orders and documentation    Part of this note may be an electronic transcription/translation of spoken language to printed text using the Dragon Dictation System.         Transcribed from ambient dictation for YG Kirkpatrick MD by Melissa Patel.  11/23/21   15:53 EST    Patient verbalized consent to the visit recording.  I have personally performed the services described in this document as transcribed by the above individual, and it is both accurate and complete.  YG Kirkpatrick MD  11/24/2021  09:06 EST

## 2021-11-25 PROCEDURE — 93296 REM INTERROG EVL PM/IDS: CPT | Performed by: INTERNAL MEDICINE

## 2021-11-25 PROCEDURE — 93294 REM INTERROG EVL PM/LDLS PM: CPT | Performed by: INTERNAL MEDICINE

## 2021-12-01 RX ORDER — SIMVASTATIN 20 MG
TABLET ORAL
Qty: 90 TABLET | Refills: 3 | Status: SHIPPED | OUTPATIENT
Start: 2021-12-01 | End: 2022-11-28

## 2021-12-09 ENCOUNTER — TELEPHONE (OUTPATIENT)
Dept: FAMILY MEDICINE CLINIC | Facility: CLINIC | Age: 81
End: 2021-12-09

## 2021-12-09 NOTE — TELEPHONE ENCOUNTER
Patient never requested to change his diabetes caregiver.  If he wants I can make a referral to one of our endocrinologists.

## 2021-12-09 NOTE — TELEPHONE ENCOUNTER
Caller: Javier Lerner    Relationship: Self    Best call back number: [phone] (H)    What is the best time to reach you: ANYTIME    Who are you requesting to speak with (clinical staff, provider,  specific staff member):   DR. JOEL    Do you know the name of the person who called:   PATIENT    What was the call regarding:   PATIENT IS REQUESTING A CALL BACK TO DISCUSS HIS BLOOD SUGAR AND MEDICAL MANAGEMENT TO AIDE HIM WITH DIABETIC CARE.   PATIENT STATES THAT HE DOES NOT WANT [facility] TO MANAGE HIS CARE FOR DIABETIC CARE, HE WANTS TO CONFIRM THAT DR. JOEL WILL BE WILLING TO TAKE CARE OF HIS DIABETIC MEDICAL CARE.     Do you require a callback: YES, PLEASE      PATIENT HAS BEEN ADVISED TO PLEASE ALLOW 48 HOURS FOR OUR CLINICAL TEAM WILL FOLLOW UP ON THIS REQUEST.

## 2021-12-14 ENCOUNTER — TELEPHONE (OUTPATIENT)
Dept: CARDIOLOGY | Facility: CLINIC | Age: 81
End: 2021-12-14

## 2021-12-14 NOTE — TELEPHONE ENCOUNTER
Mr Henderson called with concerns of the his [employer] home monitor isn't reading.  Seems that maybe it isn't holding a charge.  Gave him # to [employer] to troubleshoot.

## 2021-12-27 ENCOUNTER — TELEPHONE (OUTPATIENT)
Dept: FAMILY MEDICINE CLINIC | Facility: CLINIC | Age: 81
End: 2021-12-27

## 2021-12-27 NOTE — TELEPHONE ENCOUNTER
Caller: Javier Lerner    Relationship to patient: Self    Best call back number: [phone]    Patient is needing: PATIENT WOULD NEED DIRECTION ON HIS SUGAR LEVELS AND MAKING SURE HE IS DOING EVERYTHING CORRECTLY AND ALSO DISCUSS SOME OPTIONS OF A FREE STYLE TOM OR SOME KIND OF WAY OF CHECKING HIS BLOOD SUGAR MORE ACCURATELY    PLEASE ADVISE

## 2022-01-12 ENCOUNTER — OFFICE VISIT (OUTPATIENT)
Dept: FAMILY MEDICINE CLINIC | Facility: CLINIC | Age: 82
End: 2022-01-12

## 2022-01-12 VITALS
SYSTOLIC BLOOD PRESSURE: 158 MMHG | RESPIRATION RATE: 24 BRPM | WEIGHT: 305 LBS | HEIGHT: 70 IN | DIASTOLIC BLOOD PRESSURE: 72 MMHG | OXYGEN SATURATION: 98 % | BODY MASS INDEX: 43.67 KG/M2 | TEMPERATURE: 98 F | HEART RATE: 75 BPM

## 2022-01-12 DIAGNOSIS — E11.9 DIABETES MELLITUS WITH INSULIN THERAPY: Primary | ICD-10-CM

## 2022-01-12 DIAGNOSIS — R35.1 BENIGN PROSTATIC HYPERPLASIA WITH NOCTURIA: ICD-10-CM

## 2022-01-12 DIAGNOSIS — Z79.4 DIABETES MELLITUS WITH INSULIN THERAPY: Primary | ICD-10-CM

## 2022-01-12 DIAGNOSIS — J20.9 ACUTE BRONCHITIS, UNSPECIFIED ORGANISM: ICD-10-CM

## 2022-01-12 DIAGNOSIS — I10 ESSENTIAL HYPERTENSION: ICD-10-CM

## 2022-01-12 DIAGNOSIS — N40.1 BENIGN PROSTATIC HYPERPLASIA WITH NOCTURIA: ICD-10-CM

## 2022-01-12 LAB
EXPIRATION DATE: ABNORMAL
HBA1C MFR BLD: 7.7 %
Lab: ABNORMAL

## 2022-01-12 PROCEDURE — 83036 HEMOGLOBIN GLYCOSYLATED A1C: CPT | Performed by: FAMILY MEDICINE

## 2022-01-12 PROCEDURE — 99214 OFFICE O/P EST MOD 30 MIN: CPT | Performed by: FAMILY MEDICINE

## 2022-01-12 RX ORDER — METOPROLOL SUCCINATE 25 MG/1
25 TABLET, EXTENDED RELEASE ORAL 2 TIMES DAILY
Qty: 60 TABLET | Refills: 0 | Status: SHIPPED | OUTPATIENT
Start: 2022-01-12 | End: 2022-01-12 | Stop reason: SDUPTHER

## 2022-01-12 RX ORDER — FLASH GLUCOSE SCANNING READER
1 EACH MISCELLANEOUS AS NEEDED
Qty: 1 EACH | Refills: 24 | Status: SHIPPED | OUTPATIENT
Start: 2022-01-12 | End: 2022-01-18 | Stop reason: SDUPTHER

## 2022-01-12 RX ORDER — FLASH GLUCOSE SENSOR
1 KIT MISCELLANEOUS AS NEEDED
Qty: 1 EACH | Refills: 24 | Status: SHIPPED | OUTPATIENT
Start: 2022-01-12 | End: 2022-01-18 | Stop reason: SDUPTHER

## 2022-01-12 RX ORDER — METOPROLOL SUCCINATE 25 MG/1
25 TABLET, EXTENDED RELEASE ORAL 2 TIMES DAILY
Qty: 180 TABLET | Refills: 3 | Status: SHIPPED | OUTPATIENT
Start: 2022-01-12 | End: 2022-05-11 | Stop reason: DRUGHIGH

## 2022-01-12 RX ORDER — LEVOFLOXACIN 500 MG/1
500 TABLET, FILM COATED ORAL DAILY
Qty: 14 TABLET | Refills: 0 | Status: SHIPPED | OUTPATIENT
Start: 2022-01-12 | End: 2022-05-09

## 2022-01-12 NOTE — PROGRESS NOTES
Subjective   Javier Lerner is a 81 y.o. male    Chief Complaint    Diabetes mellitus  Obesity  Skin lesions  Bronchitis  BPH    History of Present Illness  The patient presents today concerned about poorly-controlled diabetes mellitus. He is accompanied by an adult female. The patient sees a nurse practitioner in endocrinology at [facility] on a relatively regular basis. He is on high dose Humulin insulin at meal times. The female states his blood glucose bounces around from high to low. The patient would like his diabetes to be followed here. He asked about the automatic sugar monitors that stick on the arm.    The patient reports that he has been having injections in his knees. He states that his right knee pain has improved. The patient said he has 4 more injections to go in one knee and 5 more in the opposite knee.  He has them about 1 year apart.    The patient reports that he had a respiratory infection, and 10 to 11 days after taking Levaquin he started feeling a lot better. He feels that he still has something there, but he cannot prove it because it is clear today. He denies any coughing or wheezing.     The patient reports Dr. Wodo had given him Uroxatral for his prostate. He states that he is urinating at least every 2 hours and 2 times during the night.     He reports that he is almost out of the metoprolol.    The patient has skin tags he would like removed.    The following portions of the patient's history were reviewed and updated as appropriate: allergies, current medications, past social history and problem list    Review of Systems   Constitutional: Negative for appetite change, chills, diaphoresis, fatigue, fever and unexpected weight change.   HENT: Negative.    Eyes: Negative for visual disturbance.   Respiratory: Positive for wheezing. Negative for cough, chest tightness and shortness of breath.    Cardiovascular: Negative for chest pain, palpitations and leg swelling.    Gastrointestinal: Positive for abdominal pain. Negative for diarrhea, nausea and vomiting.   Endocrine: Negative for polydipsia, polyphagia and polyuria.   Genitourinary: Positive for difficulty urinating, dysuria, frequency and urgency. Negative for hematuria.   Musculoskeletal: Positive for arthralgias, back pain, gait problem, joint swelling and myalgias.   Skin: Negative for color change and rash.   Allergic/Immunologic: Negative for immunocompromised state.   Neurological: Negative for dizziness, syncope, weakness, light-headedness, numbness and headaches.   Hematological: Negative for adenopathy. Does not bruise/bleed easily.   Psychiatric/Behavioral: Negative for dysphoric mood. The patient is not nervous/anxious.        Objective     Vitals:    01/12/22 1529   BP: 158/72   Pulse: 75   Resp: 24   Temp: 98 °F (36.7 °C)   SpO2: 98%       Physical Exam  Vitals and nursing note reviewed.   Constitutional:       General: He is not in acute distress.     Appearance: He is well-developed. He is obese. He is not ill-appearing, toxic-appearing or diaphoretic.   HENT:      Head: Normocephalic and atraumatic.   Eyes:      General: No scleral icterus.     Conjunctiva/sclera: Conjunctivae normal.      Pupils: Pupils are equal, round, and reactive to light.   Neck:      Thyroid: No thyromegaly.      Vascular: No carotid bruit or JVD.   Cardiovascular:      Rate and Rhythm: Normal rate and regular rhythm.      Heart sounds: Normal heart sounds. No murmur heard.      Pulmonary:      Effort: Pulmonary effort is normal. No respiratory distress.      Breath sounds: Rhonchi present.   Abdominal:      General: There is no distension.      Palpations: Abdomen is soft. There is no mass.      Tenderness: There is no abdominal tenderness. There is no guarding or rebound.   Musculoskeletal:         General: No swelling.      Cervical back: Neck supple.   Lymphadenopathy:      Cervical: No cervical adenopathy.   Skin:     General:  Skin is warm and dry.      Coloration: Skin is not pale.   Neurological:      Mental Status: He is alert.      Sensory: No sensory deficit.     RESULTS:   The patient had an A1c in 11/2021 that was 7.3 percent and today his A1c is 7.7 percent.    Assessment/Plan   Problems Addressed this Visit        Endocrine and Metabolic    Diabetes mellitus with insulin therapy (HCC) - Primary    Relevant Orders    POC Glycosylated Hemoglobin (Hb A1C) (Completed)      Other Visit Diagnoses     Acute bronchitis, unspecified organism        Relevant Medications    levoFLOXacin (LEVAQUIN) 500 MG tablet    Benign prostatic hyperplasia with nocturia        Essential hypertension        Relevant Medications    metoprolol succinate XL (TOPROL-XL) 25 MG 24 hr tablet      Diagnoses       Codes Comments    Diabetes mellitus with insulin therapy (HCC)    -  Primary ICD-10-CM: E11.9, Z79.4  ICD-9-CM: 250.00, V58.67     Acute bronchitis, unspecified organism     ICD-10-CM: J20.9  ICD-9-CM: 466.0     Benign prostatic hyperplasia with nocturia     ICD-10-CM: N40.1, R35.1  ICD-9-CM: 600.01, 788.43     Essential hypertension     ICD-10-CM: I10  ICD-9-CM: 401.9         I spent 35 minutes in patient care: Reviewing records prior to the visit, examining the patient, entering orders and documentation    Part of this note may be an electronic transcription/translation of spoken language to printed text using the Dragon Dictation System.           Transcribed from ambient dictation for YG Kirkpatrick MD by Melissa Patel.  01/12/22   16:57 EST    Patient verbalized consent to the visit recording.

## 2022-01-18 ENCOUNTER — TELEPHONE (OUTPATIENT)
Dept: FAMILY MEDICINE CLINIC | Facility: CLINIC | Age: 82
End: 2022-01-18

## 2022-01-18 RX ORDER — FLASH GLUCOSE SENSOR
1 KIT MISCELLANEOUS AS NEEDED
Qty: 1 EACH | Refills: 24 | Status: SHIPPED | OUTPATIENT
Start: 2022-01-18 | End: 2022-01-20 | Stop reason: SDUPTHER

## 2022-01-18 RX ORDER — FLASH GLUCOSE SCANNING READER
1 EACH MISCELLANEOUS AS NEEDED
Qty: 1 EACH | Refills: 24 | Status: SHIPPED | OUTPATIENT
Start: 2022-01-18 | End: 2022-01-20 | Stop reason: SDUPTHER

## 2022-01-18 RX ORDER — FENOFIBRATE 160 MG/1
160 TABLET ORAL EVERY MORNING
Qty: 90 TABLET | Refills: 3 | Status: SHIPPED | OUTPATIENT
Start: 2022-01-18 | End: 2023-01-13

## 2022-01-18 NOTE — TELEPHONE ENCOUNTER
Spoke with pt, forwarded Free style Hernan to [employer], also sent Fenofibrate to [facility], per his request.

## 2022-01-18 NOTE — TELEPHONE ENCOUNTER
THE PATIENT STATES THAT DOCTOR CINDY SPRAGUE WAS CALLING IN A BLOOD GLUCOSE MONITOR FOR HIM THE PATIENT STATES THAT HE [facility] BUT THEY DO NOT PROVIDE MEDICAL DEVICES THE PATIENT CALLED MEDICARE AND THEY TOLD HIM THAT [facility] [address] DOES PROVIDE THE MEDICAL DEVICES THE PATIENT STATES THAT HE WOULD LIKE TO GET THE MONITOR FROM [facility] THE PHONE NUMBER [phone] THE PATIENT STATES THAT HE WOULD LIKE TO GET A BLOOD GLAUCOUS MONITOR THAT USES SENSORS AND NOT TEST STRIPS HE DOES NOT WANT TO HAVE TO PRICK HIS FINGERS CONSTANTLY PLEASE CALL PATIENT TO DISCUSS       PATIENT  PHONE NUMBER [phone]

## 2022-01-19 ENCOUNTER — TELEPHONE (OUTPATIENT)
Dept: FAMILY MEDICINE CLINIC | Facility: CLINIC | Age: 82
End: 2022-01-19

## 2022-01-19 NOTE — TELEPHONE ENCOUNTER
Caller: Javier Lerner    Relationship: Self    Best call back number: [phone]    What medication are you requesting: GLUCOSE MONITOR, RUBIO    What are your current symptoms: DIABETES    How long have you been experiencing symptoms:     Have you had these symptoms before:    [] Yes  [] No    Have you been treated for these symptoms before:   [] Yes  [] No    If a prescription is needed, what is your preferred pharmacy and phone number: [id number] - [place], [place] - [address]  - [phone]  - [phone] FX     Additional notes:  MEDICARE IS REQUIRING A DIAGNOSIS BECAUSE JAVIER TESTS 4 TIMES A DAY.

## 2022-01-20 RX ORDER — FLASH GLUCOSE SCANNING READER
1 EACH MISCELLANEOUS AS NEEDED
Qty: 1 EACH | Refills: 24 | Status: SHIPPED | OUTPATIENT
Start: 2022-01-20

## 2022-01-20 RX ORDER — FLASH GLUCOSE SENSOR
1 KIT MISCELLANEOUS AS NEEDED
Qty: 1 EACH | Refills: 24 | Status: SHIPPED | OUTPATIENT
Start: 2022-01-20

## 2022-01-20 RX ORDER — FENOFIBRATE 160 MG/1
TABLET ORAL
Qty: 90 TABLET | Refills: 3 | OUTPATIENT
Start: 2022-01-20

## 2022-01-28 ENCOUNTER — PROCEDURE VISIT (OUTPATIENT)
Dept: FAMILY MEDICINE CLINIC | Facility: CLINIC | Age: 82
End: 2022-01-28

## 2022-01-28 VITALS
OXYGEN SATURATION: 94 % | HEIGHT: 70 IN | WEIGHT: 305 LBS | HEART RATE: 78 BPM | BODY MASS INDEX: 43.67 KG/M2 | DIASTOLIC BLOOD PRESSURE: 68 MMHG | TEMPERATURE: 97.1 F | SYSTOLIC BLOOD PRESSURE: 152 MMHG

## 2022-01-28 DIAGNOSIS — L82.1 SEBORRHEIC KERATOSES: ICD-10-CM

## 2022-01-28 DIAGNOSIS — L57.0 ACTINIC KERATOSES: Primary | ICD-10-CM

## 2022-01-28 PROCEDURE — 17000 DESTRUCT PREMALG LESION: CPT | Performed by: FAMILY MEDICINE

## 2022-01-28 PROCEDURE — 17003 DESTRUCT PREMALG LES 2-14: CPT | Performed by: FAMILY MEDICINE

## 2022-01-28 PROCEDURE — 11305 SHAVE SKIN LESION 0.5 CM/<: CPT | Performed by: FAMILY MEDICINE

## 2022-01-28 NOTE — PROGRESS NOTES
Subjective   Javier Lerner is a 81 y.o. male    Chief Complaint    Multiple skin lesions he desires to have removed      History of Present Illness  The patient has multiple skin lesions about his head and neck that he desires to have removed. He has several around his ears that are bothering him with his hearing aid use. The others have continued to increase in size and are rubbing on his clothing causing irritation.    The following portions of the patient's history were reviewed and updated as appropriate: allergies, current medications, past social history and problem list    Review of Systems   Constitutional: Negative for chills and fever.   Respiratory: Negative for cough and shortness of breath.    Cardiovascular: Negative for chest pain and palpitations.   Skin:        Multiple lesions as described     Objective     Vitals:    01/28/22 1456   BP: 152/68   Pulse: 78   Temp: 97.1 °F (36.2 °C)   SpO2: 94%       Physical Exam  Skin:     Comments: Skin: The patient has 7 raised keratotic-appearing lesions on his left and right upper chest and neck areas.     He also has 7 keratotic lesions in the post auricular area and left and right neck areas.    PROCEDURE:   These are all prepped with Betadine, local anesthesia with 1percent Xylocaine with epinephrine, shave removal with a #15 blade scalpel. Hemostasis with the hyfrecator. The areas were cleaned. Antibiotic ointment was applied and a Band-Aid dressing. He has a large 3/4-inch seborrheic keratosis on the left chest that is treated with liquid nitrogen.     He also has multiple actinic keratoses behind his left ear, 4 of them, and behind the right ear, 3 of them. They are all treated with liquid nitrogen. Patient tolerated the procedure without any adverse effect. He is given wound care instructions verbally. I also discussed this with his wife after the procedure. Certainly, the large seborrheic keratosis on his left chest may need a second treatment  with cryotherapy.    Assessment/Plan   Problems Addressed this Visit     None      Visit Diagnoses     Actinic keratoses    -  Primary    Seborrheic keratoses          Diagnoses       Codes Comments    Actinic keratoses    -  Primary ICD-10-CM: L57.0  ICD-9-CM: 702.0     Seborrheic keratoses     ICD-10-CM: L82.1  ICD-9-CM: 702.19         Wound care instructions given    I spent 40 minutes in patient care: Reviewing records prior to the visit, examining the patient, entering orders and documentation    Part of this note may be an electronic transcription/translation of spoken language to printed text using the Dragon Dictation System.           Transcribed from ambient dictation for YG Kirkpatrick MD by Melissa Patel.  01/28/22   16:46 EST    Patient verbalized consent to the visit recording.  I have personally performed the services described in this document as transcribed by the above individual, and it is both accurate and complete.  YG Kirkpatrick MD  1/30/2022  11:47 EST

## 2022-02-17 RX ORDER — APIXABAN 5 MG/1
TABLET, FILM COATED ORAL
Qty: 180 TABLET | Refills: 3 | Status: SHIPPED | OUTPATIENT
Start: 2022-02-17

## 2022-02-24 PROCEDURE — 93294 REM INTERROG EVL PM/LDLS PM: CPT | Performed by: INTERNAL MEDICINE

## 2022-02-24 PROCEDURE — 93296 REM INTERROG EVL PM/IDS: CPT | Performed by: INTERNAL MEDICINE

## 2022-05-06 ENCOUNTER — TELEPHONE (OUTPATIENT)
Dept: FAMILY MEDICINE CLINIC | Facility: CLINIC | Age: 82
End: 2022-05-06

## 2022-05-09 ENCOUNTER — OFFICE VISIT (OUTPATIENT)
Dept: FAMILY MEDICINE CLINIC | Facility: CLINIC | Age: 82
End: 2022-05-09

## 2022-05-09 ENCOUNTER — LAB (OUTPATIENT)
Dept: LAB | Facility: HOSPITAL | Age: 82
End: 2022-05-09

## 2022-05-09 VITALS
WEIGHT: 312 LBS | SYSTOLIC BLOOD PRESSURE: 162 MMHG | HEART RATE: 71 BPM | RESPIRATION RATE: 24 BRPM | HEIGHT: 70 IN | BODY MASS INDEX: 44.67 KG/M2 | TEMPERATURE: 97.1 F | OXYGEN SATURATION: 97 % | DIASTOLIC BLOOD PRESSURE: 60 MMHG

## 2022-05-09 DIAGNOSIS — Z79.4 DIABETES MELLITUS WITH INSULIN THERAPY: ICD-10-CM

## 2022-05-09 DIAGNOSIS — E11.9 DIABETES MELLITUS WITH INSULIN THERAPY: Primary | ICD-10-CM

## 2022-05-09 DIAGNOSIS — Z48.02 VISIT FOR SUTURE REMOVAL: ICD-10-CM

## 2022-05-09 DIAGNOSIS — Z79.4 DIABETES MELLITUS WITH INSULIN THERAPY: Primary | ICD-10-CM

## 2022-05-09 DIAGNOSIS — E11.9 DIABETES MELLITUS WITH INSULIN THERAPY: ICD-10-CM

## 2022-05-09 LAB — HBA1C MFR BLD: 8 % (ref 4.8–5.6)

## 2022-05-09 PROCEDURE — 36415 COLL VENOUS BLD VENIPUNCTURE: CPT

## 2022-05-09 PROCEDURE — 99212 OFFICE O/P EST SF 10 MIN: CPT | Performed by: PHYSICIAN ASSISTANT

## 2022-05-09 PROCEDURE — 83036 HEMOGLOBIN GLYCOSYLATED A1C: CPT

## 2022-05-09 NOTE — PROGRESS NOTES
Subjective   Javier Lerner is a 81 y.o. male  Suture / Staple Removal (Lt elbow after a fall 4/23) and Diabetes (Brought log with blood sugar/units of insulin administered.)      History of Present Illness     The patient comes in for suture removal of left elbow from a fall that occurred on 04/23/2021. He also follows up of diabetes.    The patient states that he is doing well. He states that he has a scab on his left elbow that he thought was hiding some of the sutures. The patient states that he was getting off the stool and the next thing he knew his buttocks was on the ground on the concrete. He states that he had a continuous stitch placed on his left elbow. The patient denies any bleeding.    The patient states that he has quite a bit of information regarding his diabetes. He states that his blood sugar is averaging around 180. He states that his last A1c was 7.7 in 01/2022. The patient states that it was strange because he had gotten back from [place] and he had a 35 one morning and a motel out there. The patient states that he was at [facility] with Christel Walden for a long time. He states that when he was at the [facility], he was told that he has not had any red meat today and he needed to eat red meat and green vegetables and he would be alright. The patient states that he asked Dr Arroyo  if he would take him as a diabetic patient. He states that he has started keeping a log of his blood sugar. The patient states that as his blood sugar went up and down, he started shooting less and more. He states that he went from 300 units a day with Christel to now only 200 units. He states that yesterday he had 200 units of insulin.    The patient states that he is coughing quite a bit. He states that he is not bringing up nothing. The patient states that it feels like he has pleurisy.      The following portions of the patient's history were reviewed and updated as appropriate: allergies,  current medications, past social history and problem list    Review of Systems   Constitutional: Negative for appetite change, diaphoresis, fatigue and unexpected weight change.   Eyes: Negative for visual disturbance.   Respiratory: Negative for cough, chest tightness and shortness of breath.    Cardiovascular: Negative for chest pain, palpitations and leg swelling.   Gastrointestinal: Negative for diarrhea, nausea and vomiting.   Endocrine: Negative for polydipsia, polyphagia and polyuria.   Skin: Negative for color change and rash.   Neurological: Negative for dizziness, syncope, weakness, light-headedness, numbness and headaches.       Objective     Vitals:    05/09/22 1306   BP: 162/60   Pulse: 71   Resp: 24   Temp: 97.1 °F (36.2 °C)   SpO2: 97%       Physical Exam  Vitals and nursing note reviewed.   Constitutional:       General: He is not in acute distress.     Appearance: Normal appearance. He is well-developed. He is not ill-appearing, toxic-appearing or diaphoretic.   Neck:      Thyroid: No thyromegaly.      Vascular: No carotid bruit or JVD.   Cardiovascular:      Rate and Rhythm: Normal rate and regular rhythm.      Pulses: Normal pulses.      Heart sounds: Normal heart sounds. No murmur heard.  Pulmonary:      Effort: Pulmonary effort is normal. No respiratory distress.      Breath sounds: Normal breath sounds.   Abdominal:      Palpations: Abdomen is soft. There is no mass.      Tenderness: There is no abdominal tenderness.   Musculoskeletal:      Cervical back: Neck supple.   Lymphadenopathy:      Cervical: No cervical adenopathy.   Skin:     General: Skin is warm and dry.      Comments: Sutures removed on the left elbow without difficulty. Patient tolerated removal well.     Neurological:      Mental Status: He is alert.      Sensory: No sensory deficit.         Assessment/Plan     Diagnoses and all orders for this visit:    1. Diabetes mellitus with insulin therapy (HCC) (Primary)  -     Hemoglobin  A1c; Future        -     Continue insulin regime.   2. Visit for suture removal        -    Sutures removed without difficulty.     I spent 15 minutes in patient care: Reviewing records prior to the visit, examining the patient, entering orders and documentation    Part of this note may be an electronic transcription/translation of spoken language to printed text using the Dragon Dictation System.      Transcribed from ambient dictation for EVELIO Coughlin by Juan J Burton.  05/09/22   15:20 EDT    Patient verbalized consent to the visit recording.

## 2022-05-11 ENCOUNTER — TELEPHONE (OUTPATIENT)
Dept: FAMILY MEDICINE CLINIC | Facility: CLINIC | Age: 82
End: 2022-05-11

## 2022-05-11 NOTE — TELEPHONE ENCOUNTER
Caller: VINCENT    Relationship:     REPRESENTATIVE FROM [employer]    Best call back number:     [phone]    What medications are you currently taking:      N/A    When did you start taking these medications:     N/A    Which medication are you concerned about:     metoprolol succinate XL (TOPROL-XL) 25 MG 24 hr tablet    Who prescribed you this medication:     DR SHAHID    What are your concerns:     CALLER STATED PATIENT WOULD LIKE TO REPLACE THE MEDICATION LISTED ABOVE WITH METOPROLOL TARTRATE    IF DR SHAHID APPROVES, PLEASE SEND PRESCRIPTION TO:    [facility] [place], [place]    TELEPHONE CONTACT:    [phone]     IF DR SHAHID DECLINES PRESCRIPTION, PLEASE CONTACT CALLER AT TELEPHONE NUMBER INCLUDED ABOVE    How long have you had these concerns:     N/A    DR SHAHID

## 2022-05-11 NOTE — TELEPHONE ENCOUNTER
Does patient realize he has to take metoprolol tartrate every 12 hours every day not just once a day.?

## 2022-05-11 NOTE — TELEPHONE ENCOUNTER
Spoke with pt and he's fine with taking it BID, I've deleted out current dose please send in new dose of Metoprolol Tartrate.

## 2022-05-11 NOTE — TELEPHONE ENCOUNTER
PATIENT HAS CALLED REQUESTING IF PRESCRIPTION FOR Continuous Blood Gluc  (FreeStyle Hernan 14 Day [place]) device AND  Continuous Blood Gluc Sensor (FreeStyle Hernan 14 Day Sensor) misc SENT TO [employer] [facility].  PHONE NUMBER [phone]  FAX NUMBER [phone].    PATIENT IS NEEDING PRESCRIPTIONS SENT TO THIS MEDICAL SUPPLY COMPANY IN ORDER FOR INSURANCE TO PAY.  PATIENT STATES ALONG WITH PRESCRIPTION A DEMOGRAPHIC SHEET, ORDER, AND COPY OF INSURANCE.    CALL BACK NUMBER [phone]

## 2022-05-18 ENCOUNTER — TELEPHONE (OUTPATIENT)
Dept: FAMILY MEDICINE CLINIC | Facility: CLINIC | Age: 82
End: 2022-05-18

## 2022-05-18 NOTE — TELEPHONE ENCOUNTER
Pharmacy Name:  [facility]    Pharmacy representative name:     Pharmacy representative phone number: [phone]    What medication are you calling in regards to: METOPROLOL TARTRATE    What question does the pharmacy have: NEEDS CLARIFICATION     Who is the provider that prescribed the medication: CINDY SPRAGUE

## 2022-05-18 NOTE — TELEPHONE ENCOUNTER
Spoke with representative, she said there was a question about that sig; she asked if we could just resend med.

## 2022-05-18 NOTE — TELEPHONE ENCOUNTER
Soren from [employer] (supplies) needs a note/order for the glucose monitor explaining the influxes the patient has been having and that they are having to take readings 3 times a day-please emphasize.    SOREN vasquez/ [facility]  P: [phone]  F: [phone]

## 2022-05-26 PROCEDURE — 93294 REM INTERROG EVL PM/LDLS PM: CPT | Performed by: INTERNAL MEDICINE

## 2022-05-26 PROCEDURE — 93296 REM INTERROG EVL PM/IDS: CPT | Performed by: INTERNAL MEDICINE

## 2022-07-19 ENCOUNTER — OFFICE VISIT (OUTPATIENT)
Dept: FAMILY MEDICINE CLINIC | Facility: CLINIC | Age: 82
End: 2022-07-19

## 2022-07-19 VITALS
SYSTOLIC BLOOD PRESSURE: 152 MMHG | HEART RATE: 88 BPM | WEIGHT: 312 LBS | BODY MASS INDEX: 44.67 KG/M2 | TEMPERATURE: 98 F | RESPIRATION RATE: 22 BRPM | OXYGEN SATURATION: 94 % | HEIGHT: 70 IN | DIASTOLIC BLOOD PRESSURE: 80 MMHG

## 2022-07-19 DIAGNOSIS — Z79.4 DIABETES MELLITUS WITH INSULIN THERAPY: Primary | ICD-10-CM

## 2022-07-19 DIAGNOSIS — Z79.4 TYPE 2 DIABETES MELLITUS WITH HYPERGLYCEMIA, WITH LONG-TERM CURRENT USE OF INSULIN: ICD-10-CM

## 2022-07-19 DIAGNOSIS — I10 ESSENTIAL HYPERTENSION: ICD-10-CM

## 2022-07-19 DIAGNOSIS — E11.9 DIABETES MELLITUS WITH INSULIN THERAPY: Primary | ICD-10-CM

## 2022-07-19 DIAGNOSIS — E11.65 TYPE 2 DIABETES MELLITUS WITH HYPERGLYCEMIA, WITH LONG-TERM CURRENT USE OF INSULIN: ICD-10-CM

## 2022-07-19 DIAGNOSIS — J01.90 ACUTE SINUSITIS, RECURRENCE NOT SPECIFIED, UNSPECIFIED LOCATION: ICD-10-CM

## 2022-07-19 PROCEDURE — 99213 OFFICE O/P EST LOW 20 MIN: CPT | Performed by: FAMILY MEDICINE

## 2022-07-19 RX ORDER — LEVOFLOXACIN 500 MG/1
500 TABLET, FILM COATED ORAL DAILY
Qty: 10 TABLET | Refills: 0 | Status: SHIPPED | OUTPATIENT
Start: 2022-07-19 | End: 2022-10-14

## 2022-07-19 NOTE — PROGRESS NOTES
Subjective   Javier Lerner is a 81 y.o. male    Chief Complaint    Diabetes mellitus    History of Present Illness  The patient reports fluctuating blood glucose levels. His hemoglobin A1c in 05/2022 was 8.0 percent. He is concerned about his blood glucose fluctuations. His systolic blood pressure remains elevated today with a blood pressure reading of 152/80 mmHg. The patient is accompanied by an adult female.    The patient reports his glucose levels over yesterday and today were 744 mg/dL, 278 mg/dL and little bit later 273 mg/dL. He then administered 75 units of Humulin, and the total for the day was 230 mg/dL, and that night it was 265 mg/dL. The patient has seen Dr. Christel Walden, endocrinologist, in the past at the [facility]. He said he eats high protein and as many green vegetables as he can get and a potato every once in a while. He said he eats tomatoes like they are going out of style. The patient said he is administering 200 units of insulin a day. Today he has already administered 155 units of insulin. The patient weighs 300 pounds. The patient was informed that he could probably get his glucose under control with 200 to 225 units a day. In 05/2022, the patient's A1c was 8.0 percent.    The patient reports that he was experiencing pain and soreness in his eustachian tube. He states that he has had sinus-like pain and pressure over his sinuses.    The following portions of the patient's history were reviewed and updated as appropriate: allergies, current medications, past social history and problem list    Review of Systems   Constitutional: Negative for appetite change, chills, diaphoresis, fatigue, fever and unexpected weight change.   HENT: Positive for congestion, ear pain, postnasal drip, rhinorrhea and sinus pressure. Negative for sore throat.    Eyes: Positive for pain. Negative for visual disturbance.   Respiratory: Negative for cough, chest tightness and shortness of breath.     Cardiovascular: Negative for chest pain, palpitations and leg swelling.   Gastrointestinal: Negative for diarrhea, nausea and vomiting.   Endocrine: Negative for polydipsia, polyphagia and polyuria.   Skin: Negative for color change and rash.   Neurological: Negative for dizziness, syncope, weakness, light-headedness, numbness and headaches.   Hematological: Negative for adenopathy.       Objective     Vitals:    07/19/22 1435   BP: 152/80   Pulse: 88   Resp: 22   Temp: 98 °F (36.7 °C)   SpO2: 94%       Physical Exam  Vitals and nursing note reviewed.   Constitutional:       General: He is not in acute distress.     Appearance: Normal appearance. He is well-developed. He is not ill-appearing, toxic-appearing or diaphoretic.   HENT:      Head: Normocephalic and atraumatic.      Right Ear: Tympanic membrane and ear canal normal.      Left Ear: Tympanic membrane and ear canal normal.      Nose: Mucosal edema and rhinorrhea present.      Right Sinus: Maxillary sinus tenderness and frontal sinus tenderness present.      Left Sinus: Maxillary sinus tenderness and frontal sinus tenderness present.      Mouth/Throat:      Pharynx: No oropharyngeal exudate.   Eyes:      Pupils: Pupils are equal, round, and reactive to light.   Neck:      Thyroid: No thyromegaly.      Vascular: No carotid bruit or JVD.   Cardiovascular:      Rate and Rhythm: Normal rate and regular rhythm.      Pulses: Normal pulses.      Heart sounds: Normal heart sounds. No murmur heard.  Pulmonary:      Effort: Pulmonary effort is normal. No respiratory distress.      Breath sounds: Normal breath sounds.   Abdominal:      Palpations: Abdomen is soft. There is no mass.      Tenderness: There is no abdominal tenderness.   Musculoskeletal:      Cervical back: Neck supple.   Lymphadenopathy:      Cervical: No cervical adenopathy.   Skin:     General: Skin is warm and dry.   Neurological:      Mental Status: He is alert.      Sensory: No sensory deficit.          Assessment & Plan   Problems Addressed this Visit        Endocrine and Metabolic    Diabetes mellitus with insulin therapy (HCC) - Primary    Relevant Orders    Ambulatory Referral to Endocrinology (Completed)    Type 2 diabetes mellitus (HCC)    Relevant Orders    Ambulatory Referral to Endocrinology (Completed)      Other Visit Diagnoses     Essential hypertension        Acute sinusitis, recurrence not specified, unspecified location          Diagnoses       Codes Comments    Diabetes mellitus with insulin therapy (HCC)    -  Primary ICD-10-CM: E11.9, Z79.4  ICD-9-CM: 250.00, V58.67     Type 2 diabetes mellitus with hyperglycemia, with long-term current use of insulin (HCC)     ICD-10-CM: E11.65, Z79.4  ICD-9-CM: 250.00, 790.29, V58.67     Essential hypertension     ICD-10-CM: I10  ICD-9-CM: 401.9     Acute sinusitis, recurrence not specified, unspecified location     ICD-10-CM: J01.90  ICD-9-CM: 461.9           I spent 30 minutes in patient care: Reviewing records prior to the visit, examining the patient, entering orders and documentation    Part of this note may be an electronic transcription/translation of spoken language to printed text using the Dragon Dictation System.  Transcribed from ambient dictation for YG Kirkpatrick MD by ILDEFONSO ROSADO.  07/19/22   16:20 EDT    Patient verbalized consent to the visit recording.  I have personally performed the services described in this document as transcribed by the above individual, and it is both accurate and complete.  YG Kirkpatrick MD  7/19/2022  22:25 EDT

## 2022-07-26 ENCOUNTER — TELEPHONE (OUTPATIENT)
Dept: FAMILY MEDICINE CLINIC | Facility: CLINIC | Age: 82
End: 2022-07-26

## 2022-07-26 NOTE — TELEPHONE ENCOUNTER
Caller: Javier Lerner    Relationship to patient: Self    Best call back number: [phone]    Patient is needing: UPDATE ON REFERRAL FOR ENDOCRINOLOGIST

## 2022-08-03 ENCOUNTER — OFFICE VISIT (OUTPATIENT)
Dept: SLEEP MEDICINE | Facility: HOSPITAL | Age: 82
End: 2022-08-03

## 2022-08-03 VITALS
BODY MASS INDEX: 45.1 KG/M2 | WEIGHT: 315 LBS | SYSTOLIC BLOOD PRESSURE: 156 MMHG | OXYGEN SATURATION: 95 % | DIASTOLIC BLOOD PRESSURE: 70 MMHG | HEART RATE: 75 BPM | HEIGHT: 70 IN

## 2022-08-03 DIAGNOSIS — G47.33 OSA (OBSTRUCTIVE SLEEP APNEA): Primary | ICD-10-CM

## 2022-08-03 PROCEDURE — 99213 OFFICE O/P EST LOW 20 MIN: CPT | Performed by: NURSE PRACTITIONER

## 2022-08-03 NOTE — PROGRESS NOTES
Chief Complaint:   Chief Complaint   Patient presents with   • Follow-up       HPI:    Javier Lerner is a 81 y.o. male here for follow-up of hypertension, obesity, diabetes, CKD, neuropathy and sleep apnea.  Patient was last seen 8/3/2021 he does have severe sleep apnea with an AHI of 71.8.  Patient is sleeping 8 hours nightly and does feel more rested upon awakening.  He goes to sleep quickly and does get up 1-2 times in the night.  Patient has an [place] score of 4/24.  I did speak today patient an AHI of 7.5 however, unhappy with this number due to his original AHI.  Patient states he does get short of breath at times and would like his machine reset 10-18.  We will get this done for him today.  If this should be uncomfortable he will call and let me know.  Otherwise he has no current complaints and will continue CPAP.        Current medications are:   Current Outpatient Medications:   •  alfuzosin (UROXATRAL) 10 MG 24 hr tablet, Take 10 mg by mouth Daily., Disp: , Rfl:   •  B-D UF III MINI PEN NEEDLES 31G X 5 MM misc, , Disp: , Rfl:   •  cholecalciferol (VITAMIN D3) 25 MCG (1000 UT) tablet, , Disp: , Rfl:   •  Continuous Blood Gluc  (FreeStyle Hernan 14 Day [place]) device, 1 application As Needed (test glucose). Dx: E11.9, Disp: 1 each, Rfl: 24  •  Continuous Blood Gluc Sensor (FreeStyle Hernan 14 Day Sensor) misc, 1 application As Needed (test glucose). Dx: E11.9, Disp: 1 each, Rfl: 24  •  Eliquis 5 MG tablet tablet, TAKE 1 TABLET EVERY 12 HOURS, Disp: 180 tablet, Rfl: 3  •  fenofibrate 160 MG tablet, Take 1 tablet by mouth Every Morning., Disp: 90 tablet, Rfl: 3  •  furosemide (LASIX) 20 MG tablet, Take 20 mg by mouth Daily., Disp: , Rfl:   •  glucose blood test strip, OneTouch Verio test strips  USE TO TEST BLOOD GLUCOSE 3 TIMES DAILY e11.65, Disp: , Rfl:   •  hydrALAZINE (APRESOLINE) 25 MG tablet, Take 25 mg by mouth 3 (Three) Times a Day., Disp: , Rfl:   •  Insulin Regular Human, Conc, (HumuLIN R  U-500 KwikPen) 500 UNIT/ML solution pen-injector CONCENTRATED injection, Humulin R U-500 (Conc) Insulin Kwikpen 500 unit/mL (3 mL) subcutaneous  Inject 80 units before breakfast, lunch, and dinner E11.65, Disp: , Rfl:   •  levoFLOXacin (LEVAQUIN) 500 MG tablet, Take 1 tablet by mouth Daily., Disp: 10 tablet, Rfl: 0  •  metoprolol tartrate (LOPRESSOR) 25 MG tablet, Take 1 tablet by mouth 2 (Two) Times a Day., Disp: 180 tablet, Rfl: 3  •  nitroglycerin (NITROSTAT) 0.4 MG SL tablet, Place 1 tablet under the tongue Every 5 (Five) Minutes As Needed for Chest Pain. Take no more than 3 doses in 15 minutes., Disp: 100 tablet, Rfl: 1  •  OneTouch Verio test strip, , Disp: , Rfl:   •  simvastatin (ZOCOR) 20 MG tablet, TAKE 1 TABLET EVERY NIGHT, Disp: 90 tablet, Rfl: 3.      The patient's relevant past medical, surgical, family and social history were reviewed and updated in Epic as appropriate.       Review of Systems   Constitutional: Positive for diaphoresis.   HENT: Positive for congestion, hearing loss and postnasal drip.    Eyes: Positive for visual disturbance.   Respiratory: Positive for apnea and shortness of breath.    Cardiovascular: Positive for chest pain, palpitations and leg swelling.   Endocrine: Positive for cold intolerance and heat intolerance.   Musculoskeletal: Positive for arthralgias, gait problem and joint swelling.   Neurological: Positive for numbness and headaches.   Psychiatric/Behavioral: Positive for dysphoric mood and sleep disturbance. The patient is nervous/anxious.    All other systems reviewed and are negative.          Physical Exam  Constitutional:       Appearance: Normal appearance.   HENT:      Head: Normocephalic and atraumatic.      Mouth/Throat:      Comments: Class 2 airway  Cardiovascular:      Rate and Rhythm: Normal rate and regular rhythm.   Pulmonary:      Effort: Pulmonary effort is normal.      Breath sounds: Normal breath sounds.   Skin:     General: Skin is warm and dry.  "  Neurological:      Mental Status: He is alert and oriented to person, place, and time.   Psychiatric:         Mood and Affect: Mood normal.         Behavior: Behavior normal.         Thought Content: Thought content normal.         Judgment: Judgment normal.       /70   Pulse 75   Ht 177.8 cm (70\")   Wt (!) 145 kg (319 lb)   SpO2 95%   BMI 45.77 kg/m²     CPAP Report  90/90 days of use  Greater than 4-hour use 98%  Settings 8-18  95th percentile pressure 11.3  AHI of 7.5    The patient continues to use and benefit from CPAP therapy.    ASSESSMENT/PLAN    Diagnoses and all orders for this visit:    1. OMAIRA (obstructive sleep apnea) (Primary)  -     PAP Therapy        1. Counseled patient regarding multimodal approach with healthy nutrition, healthy sleep, regular physical activity, social activities, counseling, and medications. Encouraged to practice lateral sleep position. Avoid alcohol and sedatives close to bedtime.  2. Refill supplies x1 year.  Increase settings 10-18 per patient request I will see him back in 1 year or sooner symptoms warrant.      I have reviewed the results of my evaluation and impression and discussed my recommendations in detail with the patient.      Signed by  BOB Gomez    August 3, 2022      CC: Javier Kirkpatrick MD         No ref. provider found      "

## 2022-08-25 PROCEDURE — 93296 REM INTERROG EVL PM/IDS: CPT | Performed by: INTERNAL MEDICINE

## 2022-08-25 PROCEDURE — 93294 REM INTERROG EVL PM/LDLS PM: CPT | Performed by: INTERNAL MEDICINE

## 2022-09-01 ENCOUNTER — TELEPHONE (OUTPATIENT)
Dept: FAMILY MEDICINE CLINIC | Facility: CLINIC | Age: 82
End: 2022-09-01

## 2022-09-01 NOTE — TELEPHONE ENCOUNTER
Caller: Javier Lerner    Relationship: Self    Best call back number: [phone]    What is the best time to reach you: ANYTIME     Who are you requesting to speak with (clinical staff, provider,  specific staff member): CLINICAL STAFF        What was the call regarding: THE PATIENT WOULD LIKE THE OFFICE TO KNOW THAT HE IS TRYING TO ARRANGE HOME HEALTH FOR HIMSELF THROUGH [employer] HE STATES THAT THEY WILL BE CONTACTING THE OFFICE FOR MORE INFORMATION ON HIM

## 2022-09-13 ENCOUNTER — TELEPHONE (OUTPATIENT)
Dept: FAMILY MEDICINE CLINIC | Facility: CLINIC | Age: 82
End: 2022-09-13

## 2022-09-13 NOTE — TELEPHONE ENCOUNTER
Caller: Javier Lerner    Relationship to patient: Self    Best call back number: [phone]    Date of exposure: NA    Date of positive COVID19 test: 09/12/22 HOME TEST    Date if possible COVID19 exposure: NA    COVID19 symptoms: CHEST CONGESTION. HEADACHE, COUGH, BODY ACHES    Date of initial quarantine: 09/13/22    Additional information or concerns:     What is the patients preferred pharmacy:  [facility] [address] [facility] [phone] [facility] [phone] FX

## 2022-09-14 RX ORDER — BROMPHENIRAMINE MALEATE, PSEUDOEPHEDRINE HYDROCHLORIDE, AND DEXTROMETHORPHAN HYDROBROMIDE 2; 30; 10 MG/5ML; MG/5ML; MG/5ML
5 SYRUP ORAL 4 TIMES DAILY PRN
Qty: 180 ML | Refills: 0 | Status: SHIPPED | OUTPATIENT
Start: 2022-09-14 | End: 2022-10-14

## 2022-09-14 NOTE — TELEPHONE ENCOUNTER
Caller: Javier Lerner    Relationship: Self    Best call back number:     What is the best time to reach you: ANYTIME      Who are you requesting to speak with (clinical staff, provider,  specific staff member): CLINICAL STAFF    Do you know the name of the person who called: JAVIER    What was the call regarding: PATIENT CALLING BACK REGARDING HIS COVD DIAGNOSIS; SEE PREVIOUS ENCOUNTER    Do you require a callback: YES

## 2022-10-14 ENCOUNTER — OFFICE VISIT (OUTPATIENT)
Dept: CARDIOLOGY | Facility: CLINIC | Age: 82
End: 2022-10-14

## 2022-10-14 VITALS
OXYGEN SATURATION: 97 % | BODY MASS INDEX: 43.09 KG/M2 | DIASTOLIC BLOOD PRESSURE: 70 MMHG | WEIGHT: 307.8 LBS | HEIGHT: 71 IN | SYSTOLIC BLOOD PRESSURE: 142 MMHG | HEART RATE: 70 BPM

## 2022-10-14 DIAGNOSIS — R00.1 BRADYCARDIA: Primary | ICD-10-CM

## 2022-10-14 DIAGNOSIS — I10 PRIMARY HYPERTENSION: ICD-10-CM

## 2022-10-14 DIAGNOSIS — I45.89 CHRONOTROPIC INCOMPETENCE: ICD-10-CM

## 2022-10-14 PROCEDURE — 93280 PM DEVICE PROGR EVAL DUAL: CPT | Performed by: PHYSICIAN ASSISTANT

## 2022-10-14 PROCEDURE — 99214 OFFICE O/P EST MOD 30 MIN: CPT | Performed by: PHYSICIAN ASSISTANT

## 2022-10-14 RX ORDER — ACETAMINOPHEN 500 MG
1000 TABLET ORAL DAILY
COMMUNITY

## 2022-10-14 RX ORDER — ANTIOX #8/OM3/DHA/EPA/LUT/ZEAX 250-2.5 MG
1 CAPSULE ORAL 2 TIMES DAILY
COMMUNITY

## 2022-10-14 NOTE — PROGRESS NOTES
Cardiac Electrophysiology Outpatient Follow Up Note            [facility]    Follow Up Office Visit      Javier Lerner  [id number]  09/20/2021    Primary Care Physician: Javier Kirkpatrick MD    Referred By: No ref. provider found    Subjective     Chief Complaint:   There are no diagnoses linked to this encounter.  Chief Complaint   Patient presents with   • Severe sinus bradycardia       History of Present Illness:   Javier Lerner is a 81 y.o. male who presents to electrophysiology clinic for follow up of above complaints.  No chest pain nausea vomit fevers chills syncope presyncope.  No new issues or complaints.  He does have some baseline SOB. This is normal for him. He denies any palpitations. His BP is controlled. He follows with Nephrology for Stage III kidney disease. He has not seen Dr. King since last year.     Problem List.   1. CAD  1. MPS 10/16: normal perfusion with no evidence of ischemia, EF 67%.  2. 9/20 Lexiscan wnl w diaphragm attn EF 60%  2. Symptomatic bradycardia  1. Carolyn: holter avg rate mid 40's and planned PPM in January  2. Echo 10/16: LVEF >70, Anatomically and functionally normal valves  3. BTK ppm 1/17- abh lower rate 70  3. HTN  4. HLD  1. 7/18/2018   HDL 30 LDL 46  2. 12/12/2019   HDL 29.8 LDL 46  5. Chronic fatigue  6. CKD, stage 3, GFR 30-59   1. 1/17 creat 1.7  2. 12/19 CR 1.68  7. Obesity  8. DMT2  9. A1c 9-7.2 2017  10. OMAIRA with CPAP  11. Tobacco use - cessation 3 years ago  12. PAF  1. [id number] 4, a/c w Xarelto  Past Medical History:   Past Medical History:   Diagnosis Date   • Arthritis    • Bradycardia    • Cataract    • Cholelithiasis    • Chronic fatigue 9/22/2016   • CKD (chronic kidney disease) stage 3, GFR 30-59 ml/min (HCC)    • Colon polyp    • Diabetes mellitus (HCC)     DX. 1997, FSBS 1 X DAY   • Enlarged prostate    • GERD (gastroesophageal reflux disease)    •  Heartburn    • HL (hearing loss)    • Hyperlipidemia    • Hypertension    • Liver disease    • Myocardial infarction (HCC)    • Obesity, Class II, BMI 35-39.9, with comorbidity    • OMAIRA (obstructive sleep apnea)    • Pacemaker    • Pancreatitis     HISTORY OF   • Wears glasses        Past Surgical History:   Past Surgical History:   Procedure Laterality Date   • ANKLE SURGERY Right    • APPENDECTOMY     • CARDIAC ELECTROPHYSIOLOGY PROCEDURE N/A 2017    Procedure: Pacemaker DC new;  Surgeon: Bryson Leon MD;  Location: [facility] INVASIVE LOCATION;  Service:    • CATARACT EXTRACTION, BILATERAL     • CHOLECYSTECTOMY     • COLONOSCOPY     • HERNIA REPAIR      UMBILICAL    • PACEMAKER IMPLANTATION  2017    PER DR. LEON       Family History:   Family History   Problem Relation Age of Onset   • Heart disease Mother    • Diabetes Father    • Hypertension Father    • Heart disease Father    • Sleep apnea Father        Social History:   Social History     Socioeconomic History   • Marital status:    Tobacco Use   • Smoking status: Former     Packs/day: 0.50     Years: 60.00     Pack years: 30.00     Types: Cigarettes, Pipe     Quit date: 2014     Years since quittin.7   • Smokeless tobacco: Never   Vaping Use   • Vaping Use: Never used   Substance and Sexual Activity   • Alcohol use: No   • Drug use: No   • Sexual activity: Defer       Medications:     Current Outpatient Medications:   •  acetaminophen (TYLENOL) 500 MG tablet, Take 2 tablets by mouth Daily., Disp: , Rfl:   •  alfuzosin (UROXATRAL) 10 MG 24 hr tablet, Take 10 mg by mouth Daily., Disp: , Rfl:   •  B-D UF III MINI PEN NEEDLES 31G X 5 MM misc, , Disp: , Rfl:   •  cholecalciferol (VITAMIN D3) 25 MCG (1000 UT) tablet, Take 1 tablet by mouth Daily., Disp: , Rfl:   •  Continuous Blood Gluc  ([place] Hernan 14 Day [employer]) device, 1 application As Needed (test glucose). Dx: E11.9, Disp: 1 each, Rfl: 24  •  Continuous Blood  "Gluc Sensor (FreeStyle Hernan 14 Day Sensor) misc, 1 application As Needed (test glucose). Dx: E11.9, Disp: 1 each, Rfl: 24  •  Eliquis 5 MG tablet tablet, TAKE 1 TABLET EVERY 12 HOURS, Disp: 180 tablet, Rfl: 3  •  fenofibrate 160 MG tablet, Take 1 tablet by mouth Every Morning., Disp: 90 tablet, Rfl: 3  •  furosemide (LASIX) 40 MG tablet, Take 1 tablet by mouth Daily., Disp: , Rfl:   •  glucose blood test strip, OneTouch Verio test strips  USE TO TEST BLOOD GLUCOSE 3 TIMES DAILY e11.65, Disp: , Rfl:   •  hydrALAZINE (APRESOLINE) 50 MG tablet, Take 1 tablet by mouth 2 (Two) Times a Day., Disp: , Rfl:   •  Insulin Regular Human, Conc, (HumuLIN R U-500 KwikPen) 500 UNIT/ML solution pen-injector CONCENTRATED injection, Humulin R U-500 (Conc) Insulin Kwikpen 500 unit/mL (3 mL) subcutaneous  Inject 80 units before breakfast, lunch, and dinner E11.65, Disp: , Rfl:   •  metoprolol tartrate (LOPRESSOR) 25 MG tablet, Take 1 tablet by mouth 2 (Two) Times a Day., Disp: 180 tablet, Rfl: 3  •  multivitamins-minerals (PRESERVISION AREDS 2) capsule capsule, Take 1 capsule by mouth 2 (Two) Times a Day., Disp: , Rfl:   •  nitroglycerin (NITROSTAT) 0.4 MG SL tablet, Place 1 tablet under the tongue Every 5 (Five) Minutes As Needed for Chest Pain. Take no more than 3 doses in 15 minutes., Disp: 100 tablet, Rfl: 1  •  OneTouch Verio test strip, , Disp: , Rfl:   •  simvastatin (ZOCOR) 20 MG tablet, TAKE 1 TABLET EVERY NIGHT, Disp: 90 tablet, Rfl: 3    Allergies:   Allergies   Allergen Reactions   • Penicillins Other (See Comments)     WELTS ON HANDS AND TURN PURPLE   • Tetracyclines & Related Other (See Comments)     Blisters on skin       Objective   Vital Signs:   Vitals:    10/14/22 1345   BP: 142/70   BP Location: Right arm   Patient Position: Sitting   Cuff Size: Adult   Pulse: 70   SpO2: 97%   Weight: (!) 140 kg (307 lb 12.8 oz)   Height: 180.3 cm (71\")       PHYSICAL EXAM  General appearance: Awake, alert, cooperative  Head: " "Normocephalic, without obvious abnormality, atraumatic  Eyes: Conjunctivae/corneas clear, EOMs intact  Neck: no adenopathy, no carotid bruit, no JVD and thyroid: not enlarged  Lungs: clear to auscultation bilaterally and no rhonchi or crackles\", ' symmetric  Heart: regular rate and rhythm, S1, S2 normal, no murmur, click, rub or gallop  Abdomen: Soft, non-tender, bowel sounds normal,  no organomegaly  Extremities: extremities normal, atraumatic, no cyanosis or edema  Skin: Skin color, turgor normal, no rashes or lesions  Neurologic: Grossly normal     Lab Results   Component Value Date    GLUCOSE 180 (H) 01/04/2017    CALCIUM 11.2 (H) 01/04/2017     01/04/2017    K 4.0 01/04/2017    CO2 26.0 01/04/2017     01/04/2017    BUN 19 01/04/2017    CREATININE 1.60 (H) 01/04/2017    EGFRIFNONA 42 (L) 01/04/2017    BCR 11.9 01/04/2017    ANIONGAP 10.0 01/04/2017     Lab Results   Component Value Date    WBC 7.24 01/04/2017    HGB 14.7 01/04/2017    HCT 41.6 01/04/2017    MCV 89.8 01/04/2017     01/04/2017     Lab Results   Component Value Date    INR 1.04 01/04/2017    PROTIME 11.3 01/04/2017     Lab Results   Component Value Date    TSH 1.811 09/20/2016       Cardiac Testing:   Manual Device Interrogation: Manual interrogation, Current [employer]. DDD 70. A paced 71%. Rv paced 3%. Normal P wave R wave Threshold and impedances. Battery voltage 55%, 4 years. Less than 1% mode switch.   I personally viewed and interpreted the patient's EKG/Telemetry/lab data    Procedures    Tobacco Cessation: N/A  Obstructive Sleep Apnea Screening: Completed    Assessment & Plan    There are no diagnoses linked to this encounter.     Diagnosis Plan   1. Severe sinus bradycardia   permanent pacemaker in situ.  Normal device function appropriate pacing sensing impedance values.  Chronotropic competency has been restored.     2. Chronotropic incompetence   as above.   3. OMAIRA (obstructive sleep apnea)   well-controlled.   4. Essential " hypertension   Per primary care physician.     5.   PAF: Less than 1% mode switch. On Eliquis for Chadsvasc=4.   Continue current medical therapy, Follow up one year or sooner as needed.     Electronically signed by EVELIO Mcgrath, 10/14/22, 2:41 PM EDT.

## 2022-10-27 ENCOUNTER — LAB (OUTPATIENT)
Dept: LAB | Facility: HOSPITAL | Age: 82
End: 2022-10-27

## 2022-10-27 ENCOUNTER — OFFICE VISIT (OUTPATIENT)
Dept: ENDOCRINOLOGY | Facility: CLINIC | Age: 82
End: 2022-10-27

## 2022-10-27 VITALS
SYSTOLIC BLOOD PRESSURE: 166 MMHG | DIASTOLIC BLOOD PRESSURE: 60 MMHG | HEIGHT: 71 IN | HEART RATE: 89 BPM | WEIGHT: 313 LBS | BODY MASS INDEX: 43.82 KG/M2 | OXYGEN SATURATION: 98 %

## 2022-10-27 DIAGNOSIS — Z79.4 DIABETES MELLITUS WITH INSULIN THERAPY: ICD-10-CM

## 2022-10-27 DIAGNOSIS — E11.9 DIABETES MELLITUS WITH INSULIN THERAPY: Primary | ICD-10-CM

## 2022-10-27 DIAGNOSIS — Z79.4 DIABETES MELLITUS WITH INSULIN THERAPY: Primary | ICD-10-CM

## 2022-10-27 DIAGNOSIS — E11.9 DIABETES MELLITUS WITH INSULIN THERAPY: ICD-10-CM

## 2022-10-27 DIAGNOSIS — E11.65 UNCONTROLLED DIABETES MELLITUS WITH HYPERGLYCEMIA, WITH LONG-TERM CURRENT USE OF INSULIN: ICD-10-CM

## 2022-10-27 DIAGNOSIS — Z79.4 UNCONTROLLED DIABETES MELLITUS WITH HYPERGLYCEMIA, WITH LONG-TERM CURRENT USE OF INSULIN: ICD-10-CM

## 2022-10-27 LAB
EXPIRATION DATE: ABNORMAL
EXPIRATION DATE: NORMAL
GLUCOSE BLDC GLUCOMTR-MCNC: 260 MG/DL (ref 70–130)
HBA1C MFR BLD: 7.6 %
Lab: ABNORMAL
Lab: NORMAL

## 2022-10-27 PROCEDURE — 82947 ASSAY GLUCOSE BLOOD QUANT: CPT | Performed by: INTERNAL MEDICINE

## 2022-10-27 PROCEDURE — 83036 HEMOGLOBIN GLYCOSYLATED A1C: CPT | Performed by: INTERNAL MEDICINE

## 2022-10-27 PROCEDURE — 3051F HG A1C>EQUAL 7.0%<8.0%: CPT | Performed by: INTERNAL MEDICINE

## 2022-10-27 PROCEDURE — 80053 COMPREHEN METABOLIC PANEL: CPT

## 2022-10-27 PROCEDURE — 99204 OFFICE O/P NEW MOD 45 MIN: CPT | Performed by: INTERNAL MEDICINE

## 2022-10-27 RX ORDER — INSULIN HUMAN 500 [IU]/ML
INJECTION, SOLUTION SUBCUTANEOUS
Qty: 9 ML | Refills: 1 | Status: SHIPPED | OUTPATIENT
Start: 2022-10-27 | End: 2022-10-27 | Stop reason: SDUPTHER

## 2022-10-27 RX ORDER — INSULIN HUMAN 500 [IU]/ML
INJECTION, SOLUTION SUBCUTANEOUS
Qty: 45 ML | Refills: 3 | Status: SHIPPED | OUTPATIENT
Start: 2022-10-27

## 2022-10-27 NOTE — PROGRESS NOTES
"     Office Note      Date: 10/27/2022  Patient Name: Javier Lerner  MRN: [id number]  : 1940    Chief Complaint   Patient presents with   • Diabetes       History of Present Illness:   Javier Lerner is a 81 y.o. male who presents for Diabetes - type 2  He is seen as a new patient  He says that the first time he heard about having high sugar was when he was found tohave sugar in his urine at age 9.  He was told to stop eating sweets. He did that until he was 15.  Then he did not have an issue until  when he had typical symptoms and was put on meds.  Has been  On metformin..    Then has been on insulin for year.   He uses u500 insulin now.     For the last 3 months he has been concentrating on cutting back on insulin and has been more careful with his diet   He takes 3 shots per day  He uses a dragan to check per day     I reviewed the data on his phone. Fasting  bg readings are good. High from noon 8 pm     Last A1c:  Hemoglobin A1C   Date Value Ref Range Status   10/27/2022 7.6 % Final   2022 8.00 (H) 4.80 - 5.60 % Final       Subjective      Diabetic Complications:  Eyes: No- see dr alvarez regularly   Kidneys: Yes, describe: has decreased egfr   Feet: Yes, describe: has neuropathy   Heart: Yes, describe: \"2 silent  Heart attacks\"    Diet and Exercise:  Meals per day: 2  Minutes of exercise per week: limited by age etc    Review of Systems:   Review of Systems   Constitutional: Positive for fatigue and unexpected weight change.   Respiratory: Positive for shortness of breath.    Cardiovascular: Positive for leg swelling.   Genitourinary: Positive for difficulty urinating.   Neurological: Positive for weakness.   Psychiatric/Behavioral: Positive for sleep disturbance.   All other systems reviewed and are negative.      The following portions of the patient's history were reviewed and updated as appropriate: allergies, current medications, past family history, past medical history, past " "social history, past surgical history and problem list.    Objective     Visit Vitals  /60   Pulse 89   Ht 180.3 cm (71\")   Wt (!) 142 kg (313 lb)   SpO2 98%   BMI 43.65 kg/m²       Labs:    CMP  Lab Results   Component Value Date    GLUCOSE 180 (H) 01/04/2017    BUN 19 01/04/2017    CREATININE 1.60 (H) 01/04/2017    EGFRIFNONA 42 (L) 01/04/2017    BCR 11.9 01/04/2017    K 4.0 01/04/2017    CO2 26.0 01/04/2017    CALCIUM 11.2 (H) 01/04/2017        CBC w/DIFF  Lab Results   Component Value Date    WBC 7.24 01/04/2017    RBC 4.63 01/04/2017    HGB 14.7 01/04/2017    HCT 41.6 01/04/2017    MCV 89.8 01/04/2017    MCH 31.7 (H) 01/04/2017    MCHC 35.3 01/04/2017    RDW 13.0 01/04/2017    RDWSD 42.3 01/04/2017    MPV 11.0 01/04/2017     01/04/2017    NEUTRORELPCT 67.8 01/04/2017    LYMPHORELPCT 21.8 (L) 01/04/2017    MONORELPCT 6.9 01/04/2017    EOSRELPCT 2.9 01/04/2017    BASORELPCT 0.3 01/04/2017    AUTOIGPER 0.3 01/04/2017    NEUTROABS 4.91 01/04/2017    LYMPHSABS 1.58 01/04/2017    MONOSABS 0.50 01/04/2017    EOSABS 0.21 01/04/2017    BASOSABS 0.02 01/04/2017    AUTOIGNUM 0.02 01/04/2017       Physical Exam:  Physical Exam  Vitals reviewed.   Constitutional:       General: He is not in acute distress.     Appearance: Normal appearance. He is obese. He is not ill-appearing, toxic-appearing or diaphoretic.   HENT:      Head: Normocephalic and atraumatic.   Eyes:      Extraocular Movements: Extraocular movements intact.   Neck:      Comments: No palpable thyroid abnormality   Cardiovascular:      Rate and Rhythm: Normal rate.      Pulses: Normal pulses.           Dorsalis pedis pulses are 2+ on the right side and 2+ on the left side.        Posterior tibial pulses are 2+ on the right side and 2+ on the left side.      Comments: 2 + edema  Musculoskeletal:      Right foot: Normal range of motion. No deformity, bunion, Charcot foot, foot drop or prominent metatarsal heads.      Left foot: Normal range of " motion. No deformity, bunion, Charcot foot, foot drop or prominent metatarsal heads.   Feet:      Right foot:      Protective Sensation: 10 sites tested. 5 sites sensed.      Skin integrity: Skin integrity normal.      Toenail Condition: Right toenails are abnormally thick.      Left foot:      Protective Sensation: 10 sites tested. 5 sites sensed.      Skin integrity: Skin integrity normal.      Toenail Condition: Left toenails are abnormally thick.      Comments: Diabetic Foot Exam Performed and Monofilament Test Performed    Lymphadenopathy:      Cervical: No cervical adenopathy.   Neurological:      Mental Status: He is alert.   Psychiatric:         Mood and Affect: Mood normal.         Thought Content: Thought content normal.         Judgment: Judgment normal.         Assessment / Plan      Assessment & Plan:  Problem List Items Addressed This Visit        Other    Uncontrolled diabetes mellitus with hyperglycemia, with long-term current use of insulin ([facility]) - Primary    Overview               Current Assessment & Plan      a1c of 7.6 is actually quite good for his situation  For a man his size he is taking a substantial but not outlandish amount of insulin  His glucose control is ok.  We will refill his insulin and dragan  He has not had a chance to see nephology for a year so I will check his renal function.  The last labs to which I have access were in 2019 and his calcium was 11.8 .          Relevant Medications    OneTouch Verio test strip    B-D UF III MINI PEN NEEDLES 31G X 5 MM misc    Insulin Regular Human, Conc, (HumuLIN R U-500 KwikPen) 500 UNIT/ML solution pen-injector CONCENTRATED injection        Steve Monet MD   10/27/2022

## 2022-10-27 NOTE — ASSESSMENT & PLAN NOTE
a1c of 7.6 is actually quite good for his situation  For a man his size he is taking a substantial but not outlandish amount of insulin  His glucose control is ok.  We will refill his insulin and dragan  He has not had a chance to see nephology for a year so I will check his renal function.  The last labs to which I have access were in 2019 and his calcium was 11.8 .

## 2022-10-28 LAB
ALBUMIN SERPL-MCNC: 4.3 G/DL (ref 3.5–5.2)
ALBUMIN/GLOB SERPL: 2 G/DL
ALP SERPL-CCNC: 48 U/L (ref 39–117)
ALT SERPL W P-5'-P-CCNC: 20 U/L (ref 1–41)
ANION GAP SERPL CALCULATED.3IONS-SCNC: 10.2 MMOL/L (ref 5–15)
AST SERPL-CCNC: 17 U/L (ref 1–40)
BILIRUB SERPL-MCNC: 0.5 MG/DL (ref 0–1.2)
BUN SERPL-MCNC: 17 MG/DL (ref 8–23)
BUN/CREAT SERPL: 12.2 (ref 7–25)
CALCIUM SPEC-SCNC: 9.7 MG/DL (ref 8.6–10.5)
CHLORIDE SERPL-SCNC: 103 MMOL/L (ref 98–107)
CO2 SERPL-SCNC: 24.8 MMOL/L (ref 22–29)
CREAT SERPL-MCNC: 1.39 MG/DL (ref 0.76–1.27)
EGFRCR SERPLBLD CKD-EPI 2021: 50.9 ML/MIN/1.73
GLOBULIN UR ELPH-MCNC: 2.2 GM/DL
GLUCOSE SERPL-MCNC: 215 MG/DL (ref 65–99)
POTASSIUM SERPL-SCNC: 4 MMOL/L (ref 3.5–5.2)
PROT SERPL-MCNC: 6.5 G/DL (ref 6–8.5)
SODIUM SERPL-SCNC: 138 MMOL/L (ref 136–145)

## 2022-10-31 ENCOUNTER — TELEPHONE (OUTPATIENT)
Dept: FAMILY MEDICINE CLINIC | Facility: CLINIC | Age: 82
End: 2022-10-31

## 2022-10-31 NOTE — TELEPHONE ENCOUNTER
Caller: YOLANDA    Relationship: Provider    Best call back number: [phone]    What form or medical record are you requesting: REQUEST PAPERWORK FOR GLUCOMETER    Who is requesting this form or medical record from you: [facility]    How would you like to receive the form or medical records (pick-up, mail, fax): [phone]    Timeframe paperwork needed: AS SOON AS POSSIBLE    Additional notes: WE ORIGINALLY FAXED THIS TO THE COMPANY ON 10/23/2022, BUT IT WAS SENT WITHOUT THE DIAGNOSIS CODE. PLEASE RESEND THIS REQUEST WITH DIAGNOSIS CODE INCLUDED.

## 2022-11-01 RX ORDER — FLASH GLUCOSE SENSOR
1 KIT MISCELLANEOUS AS NEEDED
Qty: 1 EACH | Refills: 24 | Status: CANCELLED | OUTPATIENT
Start: 2022-11-01

## 2022-11-01 NOTE — TELEPHONE ENCOUNTER
PATIENT IS NEEDING PRESCRIPTION AND INFORMATION SENT TO [facility] IN [place] FOR RUBIO 2 SENSORS. PHARMACY PHONE NUMBER [phone] FAX NUMBER [phone]. PATIENTS NUMBER [phone]. HE STATES THERE IS AN ISSUE GETTING THE SENSORS FROM PHARMACY.

## 2022-11-23 DIAGNOSIS — E11.9 DIABETES MELLITUS WITH INSULIN THERAPY: ICD-10-CM

## 2022-11-23 DIAGNOSIS — Z79.4 DIABETES MELLITUS WITH INSULIN THERAPY: ICD-10-CM

## 2022-11-23 RX ORDER — FLURBIPROFEN SODIUM 0.3 MG/ML
SOLUTION/ DROPS OPHTHALMIC
Qty: 300 EACH | Refills: 3 | Status: SHIPPED | OUTPATIENT
Start: 2022-11-23

## 2022-11-24 PROCEDURE — 93294 REM INTERROG EVL PM/LDLS PM: CPT | Performed by: INTERNAL MEDICINE

## 2022-11-24 PROCEDURE — 93296 REM INTERROG EVL PM/IDS: CPT | Performed by: INTERNAL MEDICINE

## 2022-11-28 RX ORDER — SIMVASTATIN 20 MG
TABLET ORAL
Qty: 90 TABLET | Refills: 3 | Status: SHIPPED | OUTPATIENT
Start: 2022-11-28

## 2022-12-01 ENCOUNTER — TELEPHONE (OUTPATIENT)
Dept: FAMILY MEDICINE CLINIC | Facility: CLINIC | Age: 82
End: 2022-12-01

## 2022-12-01 RX ORDER — BROMPHENIRAMINE MALEATE, PSEUDOEPHEDRINE HYDROCHLORIDE, AND DEXTROMETHORPHAN HYDROBROMIDE 2; 30; 10 MG/5ML; MG/5ML; MG/5ML
5 SYRUP ORAL 4 TIMES DAILY PRN
Qty: 180 ML | Refills: 0 | Status: SHIPPED | OUTPATIENT
Start: 2022-12-01 | End: 2022-12-08 | Stop reason: SDUPTHER

## 2022-12-01 NOTE — TELEPHONE ENCOUNTER
Caller: Javier Lerner    Relationship: Self    Best call back number: [phone]    What medication are you requesting: MEDICATION FOR COLD SYMPTOMS    What are your current symptoms: CONGESTION    If a prescription is needed, what is your preferred pharmacy and phone number: [id number] - [place], [place] - [address] - [phone] [facility] [phone]      Additional notes: PLEASE CALL WHEN THAT IS CALLED INTO PHARMACY.

## 2022-12-08 NOTE — TELEPHONE ENCOUNTER
Caller: LernerJavier gould    Relationship: Self    Best call back number: [phone]    Requested Prescriptions:   Requested Prescriptions     Pending Prescriptions Disp Refills   • brompheniramine-pseudoephedrine-DM 30-2-10 MG/5ML syrup 180 mL 0     Sig: Take 5 mL by mouth 4 (Four) Times a Day As Needed for Congestion.        Pharmacy where request should be sent: [id number] - [place], [place] - [address] - [phone] [facility] [phone] FX     Additional details provided by patient: PATIENT HAS BEEN PRESCRIBED THIS AND IS STILL COUGHING BUT HE IS ABOUT TO RUN OUT SO WOULD LIKE SOME MORE     Does the patient have less than a 3 day supply:  [x] Yes  [] No    Would you like a call back once the refill request has been completed: [x] Yes [] No    If the office needs to give you a call back, can they leave a voicemail: [x] Yes [] No    Cadance Dunaway, RegSched Rep   12/08/22 12:05 EST

## 2022-12-09 RX ORDER — BROMPHENIRAMINE MALEATE, PSEUDOEPHEDRINE HYDROCHLORIDE, AND DEXTROMETHORPHAN HYDROBROMIDE 2; 30; 10 MG/5ML; MG/5ML; MG/5ML
5 SYRUP ORAL 4 TIMES DAILY PRN
Qty: 180 ML | Refills: 0 | Status: SHIPPED | OUTPATIENT
Start: 2022-12-09 | End: 2023-01-23

## 2022-12-09 NOTE — TELEPHONE ENCOUNTER
Caller: Javier Lerner    Relationship to patient: Self    Best call back number: [phone]    Patient is needing: PATIENT CALLED REQUESTING AN UPDATE ON THE MEDICATION HE REQUESTED TO BE REFILLED.      PLEASE ADVISE.

## 2022-12-24 RX ORDER — DEXTROMETHORPHAN HYDROBROMIDE AND PROMETHAZINE HYDROCHLORIDE 15; 6.25 MG/5ML; MG/5ML
2.5 SYRUP ORAL 4 TIMES DAILY PRN
Qty: 120 ML | Refills: 0 | Status: SHIPPED | OUTPATIENT
Start: 2022-12-24

## 2023-01-13 RX ORDER — FENOFIBRATE 160 MG/1
TABLET ORAL
Qty: 90 TABLET | Refills: 3 | Status: SHIPPED | OUTPATIENT
Start: 2023-01-13

## 2023-01-23 ENCOUNTER — HOSPITAL ENCOUNTER (OUTPATIENT)
Dept: GENERAL RADIOLOGY | Facility: HOSPITAL | Age: 83
Discharge: HOME | End: 2023-01-23
Payer: MEDICARE

## 2023-01-23 ENCOUNTER — LAB (OUTPATIENT)
Dept: LAB | Facility: HOSPITAL | Age: 83
End: 2023-01-23
Payer: MEDICARE

## 2023-01-23 ENCOUNTER — OFFICE VISIT (OUTPATIENT)
Dept: FAMILY MEDICINE CLINIC | Facility: CLINIC | Age: 83
End: 2023-01-23
Payer: MEDICARE

## 2023-01-23 VITALS
SYSTOLIC BLOOD PRESSURE: 150 MMHG | TEMPERATURE: 97.6 F | RESPIRATION RATE: 24 BRPM | DIASTOLIC BLOOD PRESSURE: 68 MMHG | OXYGEN SATURATION: 98 % | WEIGHT: 309.4 LBS | HEART RATE: 76 BPM | HEIGHT: 71 IN | BODY MASS INDEX: 43.31 KG/M2

## 2023-01-23 DIAGNOSIS — E66.01 CLASS 2 SEVERE OBESITY DUE TO EXCESS CALORIES WITH SERIOUS COMORBIDITY AND BODY MASS INDEX (BMI) OF 37.0 TO 37.9 IN ADULT: ICD-10-CM

## 2023-01-23 DIAGNOSIS — Z79.4 UNCONTROLLED DIABETES MELLITUS WITH HYPERGLYCEMIA, WITH LONG-TERM CURRENT USE OF INSULIN: ICD-10-CM

## 2023-01-23 DIAGNOSIS — R05.1 ACUTE COUGH: ICD-10-CM

## 2023-01-23 DIAGNOSIS — N18.31 STAGE 3A CHRONIC KIDNEY DISEASE: ICD-10-CM

## 2023-01-23 DIAGNOSIS — E11.65 UNCONTROLLED DIABETES MELLITUS WITH HYPERGLYCEMIA, WITH LONG-TERM CURRENT USE OF INSULIN: ICD-10-CM

## 2023-01-23 DIAGNOSIS — Z00.00 ENCOUNTER FOR SUBSEQUENT ANNUAL WELLNESS VISIT (AWV) IN MEDICARE PATIENT: Primary | ICD-10-CM

## 2023-01-23 DIAGNOSIS — I48.0 PAROXYSMAL ATRIAL FIBRILLATION: ICD-10-CM

## 2023-01-23 LAB — ALBUMIN UR-MCNC: 2.7 MG/DL

## 2023-01-23 PROCEDURE — 83036 HEMOGLOBIN GLYCOSYLATED A1C: CPT

## 2023-01-23 PROCEDURE — G0439 PPPS, SUBSEQ VISIT: HCPCS | Performed by: PHYSICIAN ASSISTANT

## 2023-01-23 PROCEDURE — 80053 COMPREHEN METABOLIC PANEL: CPT

## 2023-01-23 PROCEDURE — 1159F MED LIST DOCD IN RCRD: CPT | Performed by: PHYSICIAN ASSISTANT

## 2023-01-23 PROCEDURE — 36415 COLL VENOUS BLD VENIPUNCTURE: CPT

## 2023-01-23 PROCEDURE — 1170F FXNL STATUS ASSESSED: CPT | Performed by: PHYSICIAN ASSISTANT

## 2023-01-23 PROCEDURE — 80061 LIPID PANEL: CPT

## 2023-01-23 PROCEDURE — 71046 X-RAY EXAM CHEST 2 VIEWS: CPT

## 2023-01-23 PROCEDURE — 82043 UR ALBUMIN QUANTITATIVE: CPT

## 2023-01-23 RX ORDER — BENZONATATE 100 MG/1
100 CAPSULE ORAL 2 TIMES DAILY
Qty: 20 CAPSULE | Refills: 0 | Status: SHIPPED | OUTPATIENT
Start: 2023-01-23

## 2023-01-23 RX ORDER — LEVOFLOXACIN 500 MG/1
500 TABLET, FILM COATED ORAL DAILY
Qty: 10 TABLET | Refills: 0 | Status: SHIPPED | OUTPATIENT
Start: 2023-01-23 | End: 2023-03-07

## 2023-01-23 NOTE — PROGRESS NOTES
The ABCs of the Annual Wellness Visit  Subsequent Medicare Wellness Visit    Chief Complaint   Patient presents with   • Cough     Dx'd with flu, bronchitis and strep Jan 1. Seen at  Jan 13. He has finished Augmentin, Levaquin, promethazine DM, and currently taking Mucinex.    • Medicare Wellness-subsequent     Sees cardio.       Subjective   History of Present Illness:  Javier Lerner is a 82 y.o. male who presents for a Subsequent Medicare Wellness Visit.  Patient is a pleasant 82-year-old male who comes in for Medicare wellness complains of chronic cough productive with green-yellow sputum was diagnosed with flu January 1 states cough has been productive productive with yellow sputum no fever chills  The following portions of the patient's history were reviewed and   updated as appropriate: allergies, current medications, past family history, past medical history, past social history, past surgical history and problem list.    Compared to one year ago, the patient feels his physical   health is worse    Compared to one year ago, the patient feels his mental   health is the same.    Recent Hospitalizations:  He was not admitted to the hospital during the last year.       Current Medical Providers:  Patient Care Team:  Javier Kirkpatrick MD as PCP - General (Family Medicine)  Ish Sewell DO as Consulting Physician (Cardiology)  Steve Monet MD as Consulting Physician (Endocrinology)  Steve Monet MD as Consulting Physician (Endocrinology)    Outpatient Medications Prior to Visit   Medication Sig Dispense Refill   • acetaminophen (TYLENOL) 500 MG tablet Take 2 tablets by mouth Daily.     • alfuzosin (UROXATRAL) 10 MG 24 hr tablet Take 10 mg by mouth Daily.     • B-D UF III MINI PEN NEEDLES 31G X 5 MM misc USE THREE TIMES A DAY WITH INSULIN 300 each 3   • cholecalciferol (VITAMIN D3) 25 MCG (1000 UT) tablet Take 1 tablet by mouth Daily.     • Continuous Blood Gluc   (FreeStyle Hernan 14 Day [place]) device 1 application As Needed (test glucose). Dx: E11.9 1 each 24   • Continuous Blood Gluc Sensor (FreeStyle Hernan 14 Day Sensor) misc 1 application As Needed (test glucose). Dx: E11.9 1 each 24   • Eliquis 5 MG tablet tablet TAKE 1 TABLET EVERY 12 HOURS 180 tablet 3   • fenofibrate 160 MG tablet TAKE 1 TABLET EVERY MORNING 90 tablet 3   • furosemide (LASIX) 40 MG tablet Take 1 tablet by mouth Daily.     • glucose blood test strip OneTouch Verio test strips   USE TO TEST BLOOD GLUCOSE 3 TIMES DAILY e11.65     • hydrALAZINE (APRESOLINE) 50 MG tablet Take 1 tablet by mouth 2 (Two) Times a Day.     • Insulin Regular Human, Conc, (HumuLIN R U-500 KwikPen) 500 UNIT/ML solution pen-injector CONCENTRATED injection 80 units tid 45 mL 3   • metoprolol tartrate (LOPRESSOR) 25 MG tablet Take 1 tablet by mouth 2 (Two) Times a Day. 180 tablet 3   • multivitamins-minerals (PRESERVISION AREDS 2) capsule capsule Take 1 capsule by mouth 2 (Two) Times a Day.     • nitroglycerin (NITROSTAT) 0.4 MG SL tablet Place 1 tablet under the tongue Every 5 (Five) Minutes As Needed for Chest Pain. Take no more than 3 doses in 15 minutes. 100 tablet 1   • OneTouch Verio test strip      • promethazine-dextromethorphan (PROMETHAZINE-DM) 6.25-15 MG/5ML syrup Take 2.5 mL by mouth 4 (Four) Times a Day As Needed for Cough. 120 mL 0   • simvastatin (ZOCOR) 20 MG tablet TAKE 1 TABLET EVERY NIGHT 90 tablet 3   • brompheniramine-pseudoephedrine-DM 30-2-10 MG/5ML syrup Take 5 mL by mouth 4 (Four) Times a Day As Needed for Congestion. 180 mL 0     No facility-administered medications prior to visit.       No opioid medication identified on active medication list. I have reviewed chart for other potential  high risk medication/s and harmful drug interactions in the elderly.          Aspirin is not on active medication list.  .    Patient Active Problem List   Diagnosis   • OMAIRA (obstructive sleep apnea)   • Uncontrolled  "diabetes mellitus with hyperglycemia, with long-term current use of insulin ([facility])   • Hypertension   • CKD (chronic kidney disease) stage 3, GFR 30-59 ml/min ([facility])   • Bradycardia   • Dizziness   • Chronic fatigue   • Class 2 severe obesity due to excess calories with serious comorbidity and body mass index (BMI) of 37.0 to 37.9 in adult ([facility])   • Chronotropic incompetence   • Severe sinus bradycardia   • Lower urinary tract symptoms due to benign prostatic hyperplasia   • Raised prostate specific antigen   • Type 2 diabetes mellitus ([facility])     Advance Care Planning  has NO advanced directive - information provided to the patient today    Review of Systems   Constitutional: Negative.    HENT: Negative.    Eyes: Negative.    Respiratory: Positive for shortness of breath and wheezing.    Cardiovascular: Negative.    Gastrointestinal: Negative.    Endocrine: Negative.    Genitourinary: Negative.    Musculoskeletal: Negative.    Skin: Negative.    Allergic/Immunologic: Negative.    Neurological: Negative.    Hematological: Negative.    Psychiatric/Behavioral: Negative.    All other systems reviewed and are negative.        Objective      Vitals:    01/23/23 1027   BP: 150/68   Pulse: 76   Resp: 24   Temp: 97.6 °F (36.4 °C)   TempSrc: Infrared   SpO2: 98%   Weight: (!) 140 kg (309 lb 6.4 oz)   Height: 180.3 cm (70.98\")   PainSc:   6     BMI Readings from Last 1 Encounters:   01/23/23 43.17 kg/m²   BMI is above normal parameters. Recommendations include: educational material and exercise counseling    Does the patient have evidence of cognitive impairment? No    Physical Exam  Vitals and nursing note reviewed.   Constitutional:       General: He is not in acute distress.     Appearance: Normal appearance. He is well-developed. He is not ill-appearing, toxic-appearing or diaphoretic.   Neck:      Vascular: No carotid bruit or JVD.   Cardiovascular:      Rate and Rhythm: Normal rate and regular rhythm.      Pulses: Normal " pulses.      Heart sounds: Normal heart sounds. No murmur heard.  Pulmonary:      Effort: Pulmonary effort is normal. No respiratory distress.      Breath sounds: Wheezing and rhonchi present.   Abdominal:      Palpations: Abdomen is soft.      Tenderness: There is no abdominal tenderness.   Skin:     General: Skin is warm and dry.   Neurological:      Mental Status: He is alert.       Lab Results   Component Value Date    HGBA1C 7.6 10/27/2022            HEALTH RISK ASSESSMENT    Smoking Status:  Social History     Tobacco Use   Smoking Status Former   • Packs/day: 0.50   • Years: 60.00   • Pack years: 30.00   • Types: Cigarettes, Pipe   • Quit date: 2014   • Years since quittin.0   Smokeless Tobacco Never     Alcohol Consumption:  Social History     Substance and Sexual Activity   Alcohol Use No     Fall Risk Screen:    WILNER Fall Risk Assessment was completed, and patient is at LOW risk for falls.Assessment completed on:2023    Depression Screening:  PHQ-2/PHQ-9 Depression Screening 2023   Little Interest or Pleasure in Doing Things 0-->not at all   Feeling Down, Depressed or Hopeless 0-->not at all   Trouble Falling or Staying Asleep, or Sleeping Too Much 0-->not at all   Feeling Tired or Having Little Energy 0-->not at all   Poor Appetite or Overeating 0-->not at all   Feeling Bad about Yourself - or that You are a Failure or Have Let Yourself or Your Family Down 0-->not at all   Trouble Concentrating on Things, Such as Reading the Newspaper or Watching Television 0-->not at all   Moving or Speaking So Slowly that Other People Could Have Noticed? Or the Opposite - Being So Fidgety 0-->not at all   Thoughts that You Would be Better Off Dead or of Hurting Yourself in Some Way 0-->not at all   PHQ-9: Brief Depression Severity Measure Score 0   If You Checked Off Any Problems, How Difficult Have These Problems Made It For You to Do Your Work, Take Care of Things at Home, or Get Along with Other  People? not difficult at all       Health Habits and Functional and Cognitive Screening:  Functional & Cognitive Status 1/23/2023   Do you have difficulty preparing food and eating? No   Do you have difficulty bathing yourself, getting dressed or grooming yourself? No   Do you have difficulty using the toilet? No   Do you have difficulty moving around from place to place? Yes   Do you have trouble with steps or getting out of a bed or a chair? Yes   Current Diet Diabetic Diet        Current Diet Comment primarily protein, eggs   Dental Exam Up to date   Eye Exam Up to date   Exercise (times per week) 0 times per week   Current Exercises Include No Regular Exercise   Do you need help using the phone?  No   Are you deaf or do you have serious difficulty hearing?  No   Do you need help with transportation? No   Do you need help shopping? No   Do you need help preparing meals?  No   Do you need help with housework?  No   Do you need help with laundry? No   Do you need help taking your medications? No   Do you need help managing money? No   Do you ever drive or ride in a car without wearing a seat belt? No   Have you felt unusual stress, anger or loneliness in the last month? No   Who do you live with? Spouse   If you need help, do you have trouble finding someone available to you? No   Have you been bothered in the last four weeks by sexual problems? No   Do you have difficulty concentrating, remembering or making decisions? No       Age-appropriate Screening Schedule:  Refer to the list below for future screening recommendations based on patient's age, sex and/or medical conditions. Orders for these recommended tests are listed in the plan section. The patient has been provided with a written plan.    Health Maintenance   Topic Date Due   • URINE MICROALBUMIN  05/13/2016   • DIABETIC EYE EXAM  12/04/2020   • LIPID PANEL  12/12/2020   • TDAP/TD VACCINES (1 - Tdap) 01/23/2023 (Originally 12/16/1959)   • INFLUENZA VACCINE   03/31/2023 (Originally 8/1/2022)   • ZOSTER VACCINE (1 of 2) 01/23/2024 (Originally 12/16/1990)   • HEMOGLOBIN A1C  04/27/2023              Assessment & Plan     CMS Preventative Services Quick Reference  Risk Factors Identified During Encounter  Immunizations Discussed/Encouraged: Influenza  The above risks/problems have been discussed with the patient.  Follow up actions/plans if indicated are seen below in the Assessment/Plan Section.  Pertinent information has been shared with the patient in the After Visit Summary.    Diagnoses and all orders for this visit:    1. Encounter for subsequent annual wellness visit (AWV) in Medicare patient (Primary)    2. Uncontrolled diabetes mellitus with hyperglycemia, with long-term current use of insulin ([facility])  -     Comprehensive Metabolic Panel; Future  -     Hemoglobin A1c; Future  -     Lipid Panel; Future  -     MicroAlbumin, Urine, Random - Urine, Clean Catch; Future    3. Paroxysmal atrial fibrillation ([facility])    4. Class 2 severe obesity due to excess calories with serious comorbidity and body mass index (BMI) of 37.0 to 37.9 in adult ([facility])    5. Stage 3a chronic kidney disease ([facility])    6. Acute cough  -     levoFLOXacin (Levaquin) 500 MG tablet; Take 1 tablet by mouth Daily.  Dispense: 10 tablet; Refill: 0  -     XR Chest 2 View; Future    Preventive medicine discussed, diet, exercise, healthy living discussed at length.  Discussed nutrition, physical activity, healthy weight, injury prevention, misuse of tobacco, alcohol and drugs, dental health, mental health, immunizations, screening    Part of this note may be an electronic transcription/translation of spoken language to printed text using the Dragon Dictation System.      Follow Up:  No follow-ups on file.     An After Visit Summary and PPPS were given to the patient.             Answers for HPI/ROS submitted by the patient on 1/23/2023  What is the primary reason for your visit?: Other  Please describe your  symptoms.: Cough, chest congestion 6 weeks. Ear infection, tested positive for flu, bronchitis, strep throat on jan 1, 2033.  Have you had these symptoms before?: Yes  How long have you been having these symptoms?: Greater than 2 weeks  Please list any medications you are currently taking for this condition.: Amoxicillin, mucinex DM. Latest. Urgent treatment center [facility]

## 2023-01-24 LAB
ALBUMIN SERPL-MCNC: 4.3 G/DL (ref 3.5–5.2)
ALBUMIN/GLOB SERPL: 1.9 G/DL
ALP SERPL-CCNC: 49 U/L (ref 39–117)
ALT SERPL W P-5'-P-CCNC: 24 U/L (ref 1–41)
ANION GAP SERPL CALCULATED.3IONS-SCNC: 13.6 MMOL/L (ref 5–15)
AST SERPL-CCNC: 30 U/L (ref 1–40)
BILIRUB SERPL-MCNC: 0.4 MG/DL (ref 0–1.2)
BUN SERPL-MCNC: 22 MG/DL (ref 8–23)
BUN/CREAT SERPL: 15.1 (ref 7–25)
CALCIUM SPEC-SCNC: 9.7 MG/DL (ref 8.6–10.5)
CHLORIDE SERPL-SCNC: 103 MMOL/L (ref 98–107)
CHOLEST SERPL-MCNC: 138 MG/DL (ref 0–200)
CO2 SERPL-SCNC: 22.4 MMOL/L (ref 22–29)
CREAT SERPL-MCNC: 1.46 MG/DL (ref 0.76–1.27)
EGFRCR SERPLBLD CKD-EPI 2021: 47.7 ML/MIN/1.73
GLOBULIN UR ELPH-MCNC: 2.3 GM/DL
GLUCOSE SERPL-MCNC: 140 MG/DL (ref 65–99)
HBA1C MFR BLD: 8.1 % (ref 4.8–5.6)
HDLC SERPL-MCNC: 41 MG/DL (ref 40–60)
LDLC SERPL CALC-MCNC: 67 MG/DL (ref 0–100)
LDLC/HDLC SERPL: 1.5 {RATIO}
POTASSIUM SERPL-SCNC: 4.2 MMOL/L (ref 3.5–5.2)
PROT SERPL-MCNC: 6.6 G/DL (ref 6–8.5)
SODIUM SERPL-SCNC: 139 MMOL/L (ref 136–145)
TRIGL SERPL-MCNC: 177 MG/DL (ref 0–150)
VLDLC SERPL-MCNC: 30 MG/DL (ref 5–40)

## 2023-01-25 ENCOUNTER — TELEPHONE (OUTPATIENT)
Dept: ENDOCRINOLOGY | Facility: CLINIC | Age: 83
End: 2023-01-25
Payer: MEDICARE

## 2023-02-23 PROCEDURE — 93294 REM INTERROG EVL PM/LDLS PM: CPT | Performed by: INTERNAL MEDICINE

## 2023-02-23 PROCEDURE — 93296 REM INTERROG EVL PM/IDS: CPT | Performed by: INTERNAL MEDICINE

## 2023-03-07 ENCOUNTER — TELEPHONE (OUTPATIENT)
Dept: FAMILY MEDICINE CLINIC | Facility: CLINIC | Age: 83
End: 2023-03-07
Payer: MEDICARE

## 2023-03-07 RX ORDER — LEVOFLOXACIN 500 MG/1
500 TABLET, FILM COATED ORAL DAILY
Qty: 10 TABLET | Refills: 0 | Status: SHIPPED | OUTPATIENT
Start: 2023-03-07

## 2023-03-07 NOTE — TELEPHONE ENCOUNTER
Antibiotic prescription sent to local [facility] pharmacy

## 2023-03-07 NOTE — TELEPHONE ENCOUNTER
Patient stated that he wanted to know if  would call him in an antibiotic or something for his bronchitis. He stated that he gets it twice a year around this time. I told him that I would have to send a message to see if we can without an appointment. Patient said that he can't wait until Thursday due to cough getting worse. This Thursday was the first available appt. His call back number is, [phone].   Thanks,   Blanca.....

## 2023-04-24 ENCOUNTER — TELEPHONE (OUTPATIENT)
Dept: ENDOCRINOLOGY | Facility: CLINIC | Age: 83
End: 2023-04-24
Payer: MEDICARE

## 2023-04-24 NOTE — TELEPHONE ENCOUNTER
Spoke to pt-advised he needs an appt.  When I call him  back he will scheduled    Blood sugars today going from now to 730 am:  124,196,226, 265,256,241    Yesterday starting from 1142pm to 725am:  133, 105, 128, 142, 203, 228, 195, 194, 258    He is taking 80-90 units of u-500 insulin twice a day usually around 9am and 4pm

## 2023-04-24 NOTE — TELEPHONE ENCOUNTER
You cannot use u 500 as a sliding scale. He can take more  Insulin in the morning to prevent high blood sugars in the afternoon and less insulin in the evening to prevent lows in the morning or vice versa   He can make changes in 5 unit increments.

## 2023-04-24 NOTE — TELEPHONE ENCOUNTER
PATIENT IS CALLING STATING HIS BLOOD SUGAR  THIS MORNING, LAS NIGHT BLOOD SUGAR , HE NEEDS TO BE ADVISED ON HOW TO LOWER THE SUGARS AND SEE IF THERE IS A SLIDING SCALE TO HELP HIM WITH THE BLOOD SUGAR BEING HIGH THIS MORNING. PHONE NUMBER [phone]

## 2023-04-26 ENCOUNTER — OFFICE VISIT (OUTPATIENT)
Dept: ENDOCRINOLOGY | Facility: CLINIC | Age: 83
End: 2023-04-26
Payer: MEDICARE

## 2023-04-26 VITALS
SYSTOLIC BLOOD PRESSURE: 140 MMHG | DIASTOLIC BLOOD PRESSURE: 68 MMHG | BODY MASS INDEX: 44.38 KG/M2 | WEIGHT: 310 LBS | OXYGEN SATURATION: 98 % | HEIGHT: 70 IN | HEART RATE: 71 BPM

## 2023-04-26 DIAGNOSIS — E11.65 UNCONTROLLED DIABETES MELLITUS WITH HYPERGLYCEMIA, WITH LONG-TERM CURRENT USE OF INSULIN: Primary | ICD-10-CM

## 2023-04-26 DIAGNOSIS — Z79.4 UNCONTROLLED DIABETES MELLITUS WITH HYPERGLYCEMIA, WITH LONG-TERM CURRENT USE OF INSULIN: Primary | ICD-10-CM

## 2023-04-26 DIAGNOSIS — E11.9 DIABETES MELLITUS WITH INSULIN THERAPY: ICD-10-CM

## 2023-04-26 DIAGNOSIS — Z79.4 DIABETES MELLITUS WITH INSULIN THERAPY: ICD-10-CM

## 2023-04-26 LAB
EXPIRATION DATE: NORMAL
EXPIRATION DATE: NORMAL
GLUCOSE BLDC GLUCOMTR-MCNC: 80 MG/DL (ref 70–130)
HBA1C MFR BLD: 7.8 %
Lab: NORMAL
Lab: NORMAL

## 2023-04-26 RX ORDER — ALBUTEROL SULFATE 90 UG/1
AEROSOL, METERED RESPIRATORY (INHALATION)
COMMUNITY
Start: 2023-01-01

## 2023-04-26 RX ORDER — COLISTIN SULFATE, NEOMYCIN SULFATE, THONZONIUM BROMIDE AND HYDROCORTISONE ACETATE 3; 3.3; .5; 1 MG/ML; MG/ML; MG/ML; MG/ML
3 SUSPENSION AURICULAR (OTIC) 4 TIMES DAILY
Qty: 10 ML | Refills: 0 | Status: SHIPPED | OUTPATIENT
Start: 2023-04-26

## 2023-04-26 NOTE — ASSESSMENT & PLAN NOTE
a1c at goal. I advised him to take more insulin at breakfast to prevent the high sugar at lunch    no known allergies

## 2023-04-26 NOTE — PROGRESS NOTES
"     Office Note      Date: 2023  Patient Name: Javier Lerner  MRN: [id number]  : 1940    Chief Complaint   Patient presents with   • Diabetes       History of Present Illness:   Javier Lerner is a 82 y.o. male who presents for Diabetes type 2.   Current RX  High dose insulin     Bg checks are done:>10 times per day with dragan   The last 2 weeks of dragan data/ 39 % in ranges. 43 % high. 18 % very high. The pattern shows high sugars are lunch      Last A1c:  Hemoglobin A1C   Date Value Ref Range Status   2023 7.8 % Final   2023 8.10 (H) 4.80 - 5.60 % Final       Changes in health [place]  last visit: up to date. Last eye exam up to date.    Subjective              Review of Systems:   Review of Systems   Constitutional: Negative.    HENT: Negative.    Eyes: Negative.    Respiratory: Negative.        The following portions of the patient's history were reviewed and updated as appropriate: allergies, current medications, past family history, past medical history, past social history, past surgical history and problem list.    Objective     Visit Vitals  /68   Pulse 71   Ht 177.8 cm (70\")   Wt (!) 141 kg (310 lb)   SpO2 98%   BMI 44.48 kg/m²           Physical Exam:  Physical Exam  Vitals reviewed.   Constitutional:       Appearance: Normal appearance.   Neurological:      Mental Status: He is alert.   Psychiatric:         Mood and Affect: Mood normal.         Behavior: Behavior normal.         Thought Content: Thought content normal.         Judgment: Judgment normal.          Assessment / Plan      Assessment & Plan:  Problem List Items Addressed This Visit        Other    Uncontrolled diabetes mellitus with hyperglycemia, with long-term current use of insulin - Primary    Overview               Current Assessment & Plan      a1c at goal. I advised him to take more insulin at breakfast to prevent the high sugar at lunch          Relevant Medications    OneTouch Verio test " strip    Insulin Regular Human, Conc, (HumuLIN R U-500 KwikPen) 500 UNIT/ML solution pen-injector CONCENTRATED injection    B-D UF III MINI PEN NEEDLES 31G X 5 MM misc    Other Relevant Orders    POC Glucose, Blood (Completed)    POC Glycosylated Hemoglobin (Hb A1C) (Completed)   Other Visit Diagnoses     Diabetes mellitus with insulin therapy               Steve Monet MD   04/26/2023

## 2023-05-22 RX ORDER — APIXABAN 5 MG/1
TABLET, FILM COATED ORAL
Qty: 180 TABLET | Refills: 3 | Status: SHIPPED | OUTPATIENT
Start: 2023-05-22 | End: 2023-05-22

## 2023-05-24 ENCOUNTER — TELEPHONE (OUTPATIENT)
Dept: FAMILY MEDICINE CLINIC | Facility: CLINIC | Age: 83
End: 2023-05-24

## 2023-05-24 NOTE — TELEPHONE ENCOUNTER
Caller: Javier Lerner    Relationship: Self    Best call back number: [phone]    What is the best time to reach you: ANYTIME    Who are you requesting to speak with (clinical staff, provider,  specific staff member): PCP/MA      What was the call regarding: PATIENT HAS RIGHT SHOULDER/ARM PAIN AND HE THINKS IT IS A PINCHED NERVE. CAUSING TROUBLE SLEEPING. REQUEST REFERRAL FOR PHYSICAL THERAPY OR TO SEE WHAT PCP THINKS. 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 PATIENT WANTS TO KNOW WHAT HE NEEDS TO DO ABOUT THAT ALSO. REQUEST CALLBACK TODAY TO DISCUSS    Do you require a callback: YES

## 2023-06-08 ENCOUNTER — OFFICE VISIT (OUTPATIENT)
Dept: FAMILY MEDICINE CLINIC | Facility: CLINIC | Age: 83
End: 2023-06-08
Payer: MEDICARE

## 2023-06-08 VITALS
WEIGHT: 310.6 LBS | SYSTOLIC BLOOD PRESSURE: 140 MMHG | HEIGHT: 70 IN | TEMPERATURE: 96.6 F | BODY MASS INDEX: 44.47 KG/M2 | DIASTOLIC BLOOD PRESSURE: 70 MMHG | HEART RATE: 99 BPM | OXYGEN SATURATION: 92 % | RESPIRATION RATE: 22 BRPM

## 2023-06-08 DIAGNOSIS — I10 ESSENTIAL HYPERTENSION: ICD-10-CM

## 2023-06-08 DIAGNOSIS — M25.562 CHRONIC PAIN OF BOTH KNEES: ICD-10-CM

## 2023-06-08 DIAGNOSIS — Z79.4 UNCONTROLLED DIABETES MELLITUS WITH HYPERGLYCEMIA, WITH LONG-TERM CURRENT USE OF INSULIN: ICD-10-CM

## 2023-06-08 DIAGNOSIS — G89.29 CHRONIC RIGHT SHOULDER PAIN: ICD-10-CM

## 2023-06-08 DIAGNOSIS — E11.65 UNCONTROLLED DIABETES MELLITUS WITH HYPERGLYCEMIA, WITH LONG-TERM CURRENT USE OF INSULIN: ICD-10-CM

## 2023-06-08 DIAGNOSIS — M25.511 CHRONIC RIGHT SHOULDER PAIN: ICD-10-CM

## 2023-06-08 DIAGNOSIS — L57.0 MULTIPLE ACTINIC KERATOSES: Primary | ICD-10-CM

## 2023-06-08 DIAGNOSIS — G89.29 CHRONIC PAIN OF BOTH KNEES: ICD-10-CM

## 2023-06-08 DIAGNOSIS — M25.561 CHRONIC PAIN OF BOTH KNEES: ICD-10-CM

## 2023-06-08 PROCEDURE — 3077F SYST BP >= 140 MM HG: CPT | Performed by: FAMILY MEDICINE

## 2023-06-08 PROCEDURE — 1159F MED LIST DOCD IN RCRD: CPT | Performed by: FAMILY MEDICINE

## 2023-06-08 PROCEDURE — 99214 OFFICE O/P EST MOD 30 MIN: CPT | Performed by: FAMILY MEDICINE

## 2023-06-08 PROCEDURE — 3078F DIAST BP <80 MM HG: CPT | Performed by: FAMILY MEDICINE

## 2023-06-08 PROCEDURE — 1160F RVW MEDS BY RX/DR IN RCRD: CPT | Performed by: FAMILY MEDICINE

## 2023-06-08 RX ORDER — TRIAMCINOLONE ACETONIDE 1 MG/G
1 CREAM TOPICAL 2 TIMES DAILY
Qty: 45 G | Refills: 3 | Status: SHIPPED | OUTPATIENT
Start: 2023-06-08

## 2023-06-08 NOTE — PROGRESS NOTES
Subjective   Javier Lerner is a 82 y.o. male     Chief complaint    Hypertension  Skin lesion, right ear  Diabetes mellitus  Right shoulder pain  Bilateral knee pain    History of present illness    Hypertension  Pertinent negatives include no chest pain, headaches, palpitations or shortness of breath.   The patient has long-standing hypertension. His blood pressure today is 140/70 mmHg. No medication refills needed. The lesion on his right ear was previous cryotherapy successfully.  Patient has multiple other skin lesions including his left hand and some about his face and forehead areas.  He has been treated with cryotherapy previously and that seemed to work well for him.    Patient complains of pain in the right shoulder longstanding seems to be getting worse.  Has decreased mobility.  Also complains of bilateral knee pain.  Has known osteoarthritis.  Wonders if he is in need of a knee replacement.  He is requesting orthopedic consultation for for these problems.    The following portions of the patient's history were reviewed and updated as appropriate: allergies, current medications, past social history and problem list    Review of Systems   Constitutional:  Negative for appetite change, diaphoresis, fatigue, fever and unexpected weight change.   Eyes:  Negative for visual disturbance.   Respiratory:  Negative for cough, chest tightness and shortness of breath.    Cardiovascular:  Negative for chest pain, palpitations and leg swelling.   Gastrointestinal:  Negative for diarrhea, nausea and vomiting.   Endocrine: Negative for polydipsia, polyphagia and polyuria.   Musculoskeletal:  Positive for arthralgias and myalgias.   Skin:  Positive for color change and rash. Negative for pallor and wound.   Neurological:  Negative for dizziness, syncope, weakness, light-headedness, numbness and headaches.      Procedure note: Liquid nitrogen use for cryotherapy for lesion on the outer portion of the left ear, lesion  on the dorsum of the left hand, lesion right temple and lesion left forehead.  All lesions consistent with actinic keratoses.    Objective     Vitals:    06/08/23 1454   BP: 140/70   Pulse: 99   Resp: 22   Temp: 96.6 °F (35.9 °C)   SpO2: 92%       Physical Exam  Vitals and nursing note reviewed.   Constitutional:       General: He is not in acute distress.     Appearance: Normal appearance. He is well-developed. He is not ill-appearing, toxic-appearing or diaphoretic.   Neck:      Thyroid: No thyromegaly.      Vascular: No carotid bruit or JVD.   Cardiovascular:      Rate and Rhythm: Normal rate and regular rhythm.      Pulses: Normal pulses.      Heart sounds: Normal heart sounds. No murmur heard.  Pulmonary:      Effort: Pulmonary effort is normal. No respiratory distress.      Breath sounds: Normal breath sounds.   Abdominal:      Palpations: Abdomen is soft. There is no mass.      Tenderness: There is no abdominal tenderness.   Musculoskeletal:      Right shoulder: Bony tenderness and crepitus present. Decreased range of motion.      Left shoulder: No tenderness or bony tenderness. Decreased range of motion.      Cervical back: Neck supple.      Right knee: Deformity and crepitus present. Decreased range of motion. Tenderness present.      Left knee: Deformity and crepitus present. Decreased range of motion. Tenderness present.   Lymphadenopathy:      Cervical: No cervical adenopathy.   Skin:     General: Skin is warm and dry.      Coloration: Skin is not pale.      Findings: No erythema or rash.      Comments: Actinic keratoses left outer ear, left hand, right temple and left forehead region.   Neurological:      Mental Status: He is alert.      Sensory: No sensory deficit.     Assessment & Plan     Problems Addressed this Visit        Endocrine and Metabolic    Uncontrolled diabetes mellitus with hyperglycemia, with long-term current use of insulin   Other Visit Diagnoses     Multiple actinic keratoses    -   Primary    Relevant Medications    triamcinolone (KENALOG) 0.1 % cream    Essential hypertension        Chronic right shoulder pain        Relevant Orders    Ambulatory Referral to Orthopedic Surgery    Chronic pain of both knees        Relevant Orders    Ambulatory Referral to Orthopedic Surgery      Diagnoses       Codes Comments    Multiple actinic keratoses    -  Primary ICD-10-CM: L57.0  ICD-9-CM: 702.0     Essential hypertension     ICD-10-CM: I10  ICD-9-CM: 401.9     Chronic right shoulder pain     ICD-10-CM: M25.511, G89.29  ICD-9-CM: 719.41, 338.29     Chronic pain of both knees     ICD-10-CM: M25.561, M25.562, G89.29  ICD-9-CM: 719.46, 338.29     Uncontrolled diabetes mellitus with hyperglycemia, with long-term current use of insulin     ICD-10-CM: E11.65, Z79.4  ICD-9-CM: 250.02, V58.67         I spent 40 minutes in patient care: Reviewing records prior to the visit, examining the patient, entering orders and documentation    Part of this note may be an electronic transcription/translation of spoken language to printed text using the Dragon Dictation System.           Transcribed from ambient dictation for YG Kirkpatrick MD by Dottie Robertson.  06/08/23   18:09 EDT  Patient or patient representative verbalized consent to the visit recording.  I have personally performed the services described in this document as transcribed by the above individual, and it is both accurate and complete.

## 2023-07-31 RX ORDER — HYDRALAZINE HYDROCHLORIDE 50 MG/1
TABLET, FILM COATED ORAL
Qty: 180 TABLET | Refills: 2 | Status: SHIPPED | OUTPATIENT
Start: 2023-07-31

## 2023-08-07 ENCOUNTER — OFFICE VISIT (OUTPATIENT)
Dept: ORTHOPEDIC SURGERY | Facility: CLINIC | Age: 83
End: 2023-08-07
Payer: MEDICARE

## 2023-08-07 VITALS
DIASTOLIC BLOOD PRESSURE: 82 MMHG | HEIGHT: 71 IN | BODY MASS INDEX: 43.96 KG/M2 | WEIGHT: 314 LBS | SYSTOLIC BLOOD PRESSURE: 168 MMHG

## 2023-08-07 DIAGNOSIS — M19.011 OSTEOARTHRITIS OF RIGHT GLENOHUMERAL JOINT: ICD-10-CM

## 2023-08-07 DIAGNOSIS — M25.561 PAIN IN BOTH KNEES, UNSPECIFIED CHRONICITY: Primary | ICD-10-CM

## 2023-08-07 DIAGNOSIS — M17.0 BILATERAL PRIMARY OSTEOARTHRITIS OF KNEE: ICD-10-CM

## 2023-08-07 DIAGNOSIS — M25.562 PAIN IN BOTH KNEES, UNSPECIFIED CHRONICITY: Primary | ICD-10-CM

## 2023-08-07 RX ORDER — BUPIVACAINE HYDROCHLORIDE 2.5 MG/ML
4 INJECTION, SOLUTION EPIDURAL; INFILTRATION; INTRACAUDAL
Status: COMPLETED | OUTPATIENT
Start: 2023-08-07 | End: 2023-08-07

## 2023-08-07 RX ORDER — LIDOCAINE HYDROCHLORIDE 10 MG/ML
4 INJECTION, SOLUTION EPIDURAL; INFILTRATION; INTRACAUDAL; PERINEURAL
Status: COMPLETED | OUTPATIENT
Start: 2023-08-07 | End: 2023-08-07

## 2023-08-07 RX ORDER — TRIAMCINOLONE ACETONIDE 40 MG/ML
2 INJECTION, SUSPENSION INTRA-ARTICULAR; INTRAMUSCULAR
Status: COMPLETED | OUTPATIENT
Start: 2023-08-07 | End: 2023-08-07

## 2023-08-07 RX ADMIN — TRIAMCINOLONE ACETONIDE 2 ML: 40 INJECTION, SUSPENSION INTRA-ARTICULAR; INTRAMUSCULAR at 11:29

## 2023-08-07 RX ADMIN — BUPIVACAINE HYDROCHLORIDE 4 ML: 2.5 INJECTION, SOLUTION EPIDURAL; INFILTRATION; INTRACAUDAL at 11:29

## 2023-08-07 RX ADMIN — LIDOCAINE HYDROCHLORIDE 4 ML: 10 INJECTION, SOLUTION EPIDURAL; INFILTRATION; INTRACAUDAL; PERINEURAL at 11:29

## 2023-08-07 NOTE — PROGRESS NOTES
Procedure   - Large Joint Arthrocentesis: bilateral knee on 8/7/2023 11:29 AM  Indications: pain  Details: (23G) needle, anterolateral approach  Medications (Right): 4 mL bupivacaine (PF) 0.25 %; 4 mL lidocaine PF 1% 1 %; 2 mL triamcinolone acetonide 40 MG/ML  Medications (Left): 4 mL bupivacaine (PF) 0.25 %; 4 mL lidocaine PF 1% 1 %; 2 mL triamcinolone acetonide 40 MG/ML  Outcome: tolerated well, no immediate complications  Procedure, treatment alternatives, risks and benefits explained, specific risks discussed. Consent was given by the patient. Immediately prior to procedure a time out was called to verify the correct patient, procedure, equipment, support staff and site/side marked as required. Patient was prepped and draped in the usual sterile fashion.

## 2023-08-07 NOTE — PROGRESS NOTES
[facility] Follow Up Visit     Office Follow Up      Date: 08/07/2023   Patient Name: Javier Lerner  MRN: [id number]  YOB: 1940    Referring Physician: No ref. provider found     Chief Complaint:   Chief Complaint   Patient presents with    Follow-up     6 week follow up; Right shoulder pain, Bilateral Knee Pain       History of Present Illness: Javier Lerner is a 82 y.o. male who is here today for follow up on right shoulder pain from glenohumeral joint osteoarthritis and rotator cuff tendinitis.  At last visit, we injected the subacromial bursa of the right shoulder with corticosteroid.  Is also started on tramadol.  Since that time, symptoms have improved.  Pain is down to 8/10.    He has not developed bilateral knee pain.  Localizes pain along the medial joint line.  Is made worse by weightbearing activity.  He is using a rolling walker to ambulate.  No recent fall or trauma.    Subjective   Review of Systems: Review of Systems   Constitutional:  Negative for chills, fever, unexpected weight gain and unexpected weight loss.   HENT:  Negative for congestion, postnasal drip and rhinorrhea.    Eyes:  Negative for blurred vision.   Respiratory:  Negative for shortness of breath.    Cardiovascular:  Negative for leg swelling.   Gastrointestinal:  Negative for abdominal pain, nausea and vomiting.   Genitourinary:  Negative for difficulty urinating.   Musculoskeletal:  Positive for arthralgias. Negative for gait problem, joint swelling and myalgias.   Skin:  Negative for skin lesions and wound.   Neurological:  Negative for dizziness, weakness, light-headedness and numbness.   Hematological:  Does not bruise/bleed easily.   Psychiatric/Behavioral:  Negative for depressed mood.       Medications:   Current Outpatient Medications:     acetaminophen (TYLENOL) 500 MG tablet, Take 2 tablets by mouth Daily., Disp: , Rfl:     albuterol  sulfate  (90 Base) MCG/ACT inhaler, 2 PUFFS EVERY 4-6 HOURS AS NEEDED USE AS NEEDED FOR SHORTNESS OF BREATH/COUGH, Disp: , Rfl:     alfuzosin (UROXATRAL) 10 MG 24 hr tablet, Take 1 tablet by mouth Daily., Disp: , Rfl:     apixaban (ELIQUIS) 5 MG tablet tablet, Take 1 tablet by mouth 2 (Two) Times a Day., Disp: 180 tablet, Rfl: 2    B-D UF III MINI PEN NEEDLES 31G X 5 MM misc, USE THREE TIMES A DAY WITH INSULIN, Disp: 300 each, Rfl: 3    cholecalciferol (VITAMIN D3) 25 MCG (1000 UT) tablet, Take 1 tablet by mouth Daily., Disp: , Rfl:     Continuous Blood Gluc  (FreeStyle Hernan 14 Day [place]) device, 1 application As Needed (test glucose). Dx: E11.9, Disp: 1 each, Rfl: 24    Continuous Blood Gluc Sensor (FreeStyle Hernan 14 Day Sensor) misc, 1 application As Needed (test glucose). Dx: E11.9, Disp: 1 each, Rfl: 24    fenofibrate 160 MG tablet, TAKE 1 TABLET EVERY MORNING, Disp: 90 tablet, Rfl: 3    furosemide (LASIX) 40 MG tablet, Take 1 tablet by mouth Daily., Disp: , Rfl:     glucose blood test strip, OneTouch Verio test strips  USE TO TEST BLOOD GLUCOSE 3 TIMES DAILY e11.65, Disp: , Rfl:     hydrALAZINE (APRESOLINE) 50 MG tablet, TAKE 1 TABLET TWICE A DAY, Disp: 180 tablet, Rfl: 2    Insulin Regular Human, Conc, (HumuLIN R U-500 KwikPen) 500 UNIT/ML solution pen-injector CONCENTRATED injection, 80 units tid, Disp: 45 mL, Rfl: 3    metoprolol tartrate (LOPRESSOR) 25 MG tablet, TAKE 1 TABLET TWICE A DAY, Disp: 180 tablet, Rfl: 3    multivitamins-minerals (PRESERVISION AREDS 2) capsule capsule, Take 1 capsule by mouth 2 (Two) Times a Day., Disp: , Rfl:     neomycin-colistin-hydrocortisone-thonzonium (Cortisporin-TC) 3.3-3-10-0.5 MG/ML otic suspension, Administer 3 drops into the left ear 4 (Four) Times a Day., Disp: 10 mL, Rfl: 0    nitroglycerin (NITROSTAT) 0.4 MG SL tablet, Place 1 tablet under the tongue Every 5 (Five) Minutes As Needed for Chest Pain. Take no more than 3 doses in 15 minutes., Disp:  "100 tablet, Rfl: 1    OneTouch Verio test strip, , Disp: , Rfl:     simvastatin (ZOCOR) 20 MG tablet, TAKE 1 TABLET EVERY NIGHT, Disp: 90 tablet, Rfl: 3    traMADol (ULTRAM) 50 MG tablet, Take 1 tablet by mouth Every 8 (Eight) Hours As Needed for Moderate Pain., Disp: 30 tablet, Rfl: 0    triamcinolone (KENALOG) 0.1 % cream, Apply 1 application topically to the appropriate area as directed 2 (Two) Times a Day., Disp: 45 g, Rfl: 3    Allergies:   Allergies   Allergen Reactions    Penicillins Other (See Comments)     WELTS ON HANDS AND TURN PURPLE    Tetracyclines & Related Other (See Comments)     Blisters on skin       I have reviewed and updated the patient's chief complaint, history of present illness, review of systems, past medical history, surgical history, family history, social history, medications and allergy list as appropriate.     Objective    Vital Signs:   Vitals:    08/07/23 1109   BP: 168/82   Weight: (!) 142 kg (314 lb)   Height: 180.3 cm (71\")     Body mass index is 43.79 kg/mý.  Class 3 Severe Obesity (BMI >=40). Obesity-related health conditions include the following: hypertension, coronary heart disease, diabetes mellitus, and dyslipidemias. Obesity is newly identified. BMI is is above average; BMI management plan is completed. We discussed portion control and increasing exercise.      Patient reports that he is a former smoker. He quit smoking in 2014.  He has not resumed smoking since that time.  This behavior was applauded and she was encouraged to continue in smoking cessation.  We will continue to monitor at subsequent visits.    Ortho Exam:  Exam right shoulder: forward flexion approximately 110 degrees. Abduction 110 degrees. Internal rotation L4. Motor testing supraspinatus 5/5.   bilateral knees: No erythema, ecchymosis, swelling.  Tender to palpation over the medial joint line.  Full range of motion in flexion extension but pain is experienced with deep knee flexion.  Can get to 0 " degrees extension, 110 degrees in flexion.  Stable to varus and valgus stress.  Negative anterior posterior drawer.  Negative Mala's.  Sensation intact to light touch.  5/5 strength.  2+ posterior tibial pulse.    Results Review:   Imaging Results (Last 24 Hours)       Procedure Component Value Units Date/Time    XR Knee 4+ View Bilateral [[id number]] Resulted: 08/07/23 1143     Updated: 08/07/23 1144    Narrative:      Indication: Bilateral knee pain    Views: Weightbearing views of the knee are submitted.     Impression: There is no fracture subluxation or dislocation. The patella   sits normally in the trochlea. There are no acute findings.  Severe   degenerative changes are noted in both knees primarily in the medial and   patellofemoral joint spaces.  There is medial joint space narrowing with   osteophyte formation, complete loss of the joint space, and subchondral   sclerosis.    Comparison: No additional images available for comparison review.              Procedures    Assessment / Plan    Assessment/Plan:   Diagnoses and all orders for this visit:    1. Pain in both knees, unspecified chronicity (Primary)  -     XR Knee 4+ View Bilateral    2. Osteoarthritis of right glenohumeral joint    3. Bilateral primary osteoarthritis of knee  -     - Large Joint Arthrocentesis: bilateral knee      Follow-up on right shoulder pain from glenohumeral joint osteoarthritis and rotator cuff tendinitis.  Symptoms improved after subacromial corticosteroid injection at last visit.  He still has residual pain but overall is improved.  We discussed the role of continued treatment with tramadol and I would have him take it on an as-needed basis.  I also discussed the role of glenohumeral joint corticosteroid injection to control pain popping clicking.  He will monitor his symptoms at this time and report back if they worsen.    He is also dealing with bilateral knee pain primarily over the medial joint line.  Symptoms made  worse by weightbearing activity.  Radiographs obtained today show complete loss of the medial joint space in both knees.  He also has significant arthritis in his patellofemoral joint space.  We discussed the risk and benefits of corticosteroid injection for pain relief.  Risk and benefits were discussed and he like to proceed.  Toller procedure well.  See procedure note.  I will have him monitor his symptoms and follow-up as they dictate.    Follow Up:   Return if symptoms worsen or fail to improve.      Olman Keen MD  [facility]

## 2023-08-14 NOTE — PROGRESS NOTES
Sleep Clinic Follow Up Note    Chief Complaint  Follow-up    Subjective     History of Present Illness (from previous encounter on 8/3/2022 with Ms. Posey):  Javier Lerner is a 81 y.o. male here for follow-up of hypertension, obesity, diabetes, CKD, neuropathy and sleep apnea.  Patient was last seen 8/3/2021 he does have severe sleep apnea with an AHI of 71.8.  Patient is sleeping 8 hours nightly and does feel more rested upon awakening.  He goes to sleep quickly and does get up 1-2 times in the night.  Patient has an [place] score of 4/24.  I did speak today patient an AHI of 7.5 however, unhappy with this number due to his original AHI.  Patient states he does get short of breath at times and would like his machine reset 10-18.  We will get this done for him today.  If this should be uncomfortable he will call and let me know.  Otherwise he has no current complaints and will continue CPAP.     (End copied text).    Interval History:  Javier Lerner is a 82 y.o. male returns for follow up and compliance of CPAP therapy. The patient was last seen on 8/3/2022 with Ms. Posey. Overall the patient feels good with regard to therapy. The device appears to be working appropriately. On average the patient sleeps 8-9 hours per night. The patient wakes 1-3 times per night. He does wake with dry mouth    The patient reports the following changes to their medical and medication history since they were last seen:    Further details are as follows:      [place] Scale is (out of 24): Total score: 8     Weight:  Current Weight: 322 lb    Weight change in the last year:  gain: 10 lbs    The patient's relevant past medical, surgical, family, and social history reviewed and updated in Epic as appropriate.    PMH:    Past Medical History:   Diagnosis Date    Arthritis     Bradycardia     Cataract     Cholelithiasis     Chronic fatigue 9/22/2016    CKD (chronic kidney disease) stage 3, GFR 30-59 ml/min     Colon polyp      Diabetes mellitus     DX. 1997, FSBS 1 X DAY    Enlarged prostate     GERD (gastroesophageal reflux disease)     Heartburn     HL (hearing loss)     Hyperlipidemia     Hypertension     Liver disease     Myocardial infarction     Obesity, Class II, BMI 35-39.9, with comorbidity     OMAIRA (obstructive sleep apnea)     Pacemaker     Pancreatitis     HISTORY OF    Wears glasses      Past Surgical History:   Procedure Laterality Date    ANKLE SURGERY Right     APPENDECTOMY      CARDIAC ELECTROPHYSIOLOGY PROCEDURE N/A 1/5/2017    Procedure: Pacemaker DC new;  Surgeon: Bryson Leon MD;  Location: [facility] INVASIVE LOCATION;  Service:     CATARACT EXTRACTION, BILATERAL      CHOLECYSTECTOMY      COLONOSCOPY      HERNIA REPAIR      UMBILICAL     PACEMAKER IMPLANTATION  01/05/2017    PER DR. LEON       Allergies   Allergen Reactions    Penicillins Other (See Comments)     WELTS ON HANDS AND TURN PURPLE    Tetracyclines & Related Other (See Comments)     Blisters on skin       MEDS:  Prior to Admission medications    Medication Sig Start Date End Date Taking? Authorizing Provider   acetaminophen (TYLENOL) 500 MG tablet Take 2 tablets by mouth Daily.    ProviderLve MD   albuterol sulfate  (90 Base) MCG/ACT inhaler 2 PUFFS EVERY 4-6 HOURS AS NEEDED USE AS NEEDED FOR SHORTNESS OF BREATH/COUGH 1/1/23   ProviderLev MD   alfuzosin (UROXATRAL) 10 MG 24 hr tablet Take 1 tablet by mouth Daily.    ProviderLev MD   apixaban (ELIQUIS) 5 MG tablet tablet Take 1 tablet by mouth 2 (Two) Times a Day. 5/22/23   Iam Richey PA   B-D UF III MINI PEN NEEDLES 31G X 5 MM misc USE THREE TIMES A DAY WITH INSULIN 11/23/22   Steve Monet MD   cholecalciferol (VITAMIN D3) 25 MCG (1000 UT) tablet Take 1 tablet by mouth Daily. 5/16/21   ProviderLev MD   Continuous Blood Gluc  (FreeStyle Hernan 14 Day [place]) device 1 application As Needed (test glucose). Dx: E11.9 1/20/22    Javier Kirkpatrick MD   Continuous Blood Gluc Sensor (FreeStyle Hernan 14 Day Sensor) misc 1 application As Needed (test glucose). Dx: E11.9 1/20/22   Javier Kirkpatrick MD   fenofibrate 160 MG tablet TAKE 1 TABLET EVERY MORNING 1/13/23   Javier Kirkpatrick MD   furosemide (LASIX) 40 MG tablet Take 1 tablet by mouth Daily.    Lev Mcgraw MD   glucose blood test strip OneTouch Verio test strips   USE TO TEST BLOOD GLUCOSE 3 TIMES DAILY e11.65    Lev Mcgraw MD   hydrALAZINE (APRESOLINE) 50 MG tablet TAKE 1 TABLET TWICE A DAY 7/31/23   Javier Kirkpatrick MD   Insulin Regular Human, Conc, (HumuLIN R U-500 KwikPen) 500 UNIT/ML solution pen-injector CONCENTRATED injection 80 units tid 10/27/22   Steve Monet MD   metoprolol tartrate (LOPRESSOR) 25 MG tablet TAKE 1 TABLET TWICE A DAY 7/12/23   Javier Kirkpatrick MD   multivitamins-minerals (PRESERVISION AREDS 2) capsule capsule Take 1 capsule by mouth 2 (Two) Times a Day.    Lev Mcgraw MD   neomycin-colistin-hydrocortisone-thonzonium (Cortisporin-TC) 3.3-3-10-0.5 MG/ML otic suspension Administer 3 drops into the left ear 4 (Four) Times a Day. 4/26/23   Steve Monet MD   nitroglycerin (NITROSTAT) 0.4 MG SL tablet Place 1 tablet under the tongue Every 5 (Five) Minutes As Needed for Chest Pain. Take no more than 3 doses in 15 minutes. 6/23/21   Prashant King MD   OneTouch Verio test strip  9/21/20   Lev Mcgraw MD   simvastatin (ZOCOR) 20 MG tablet TAKE 1 TABLET EVERY NIGHT 11/28/22   Javier Kikrpatrick MD   traMADol (ULTRAM) 50 MG tablet Take 1 tablet by mouth Every 8 (Eight) Hours As Needed for Moderate Pain. 6/23/23   Iam Keen MD   triamcinolone (KENALOG) 0.1 % cream Apply 1 application topically to the appropriate area as directed 2 (Two) Times a Day. 6/8/23   Javier Kirkpatrick MD         FH:  Family History   Problem Relation Age  "of Onset    Heart disease Mother     Diabetes Father     Hypertension Father     Heart disease Father     Sleep apnea Father        Objective   Vital Signs:  /70 (BP Location: Right arm, Patient Position: Sitting)   Pulse 81   Ht 177.8 cm (70\")   Wt (!) 146 kg (322 lb 3.2 oz)   SpO2 96%   BMI 46.23 kg/mý              Physical Exam  Vitals reviewed.   Constitutional:       Appearance: Normal appearance.   HENT:      Head: Normocephalic and atraumatic.      Nose: Nose normal.      Mouth/Throat:      Mouth: Mucous membranes are moist.   Cardiovascular:      Rate and Rhythm: Normal rate and regular rhythm.      Heart sounds: No murmur heard.    No friction rub. No gallop.   Pulmonary:      Effort: Pulmonary effort is normal. No respiratory distress.      Breath sounds: Normal breath sounds. No wheezing or rhonchi.   Musculoskeletal:      Right lower leg: Edema present.      Left lower leg: Edema present.   Neurological:      Mental Status: He is alert and oriented to person, place, and time.   Psychiatric:         Behavior: Behavior normal.             Result Review :           CPAP Report:  AHI: 8.6/h  Days of Usage: 90/90 (100%)  Number of Days Greater than 4 hours: 90/90 (100%)  Average time (days used): 10 hours 16 minutes  95th Percentile Pressure: 15.8 cm H2O  Settings: Auto CPAP-10/18 cm H2O, EPR 1, EPR level 2, response standard.        Assessment and Plan  Javier Lerner is a 82 y.o. male who returns for follow-up compliance of PAP therapy.  Pap report has been reviewed.  Overall usage and compliance are at 100%.  Patient averages 10 hours 16 minutes.  AHI is at 8.6/H.  (Original sleep study on 6/13/2021 noted an AHI of 71.8/H).  He does have a large leak at 89 L/min and I have recommended possible mask change.  He has tried several masks but continues to have difficulty. I will refill the patient's supplies, and they will return for follow-up and compliance in 1 year or sooner should they have " further questions or concerns.     We have discussed his increased dependent edema.  The patient reports that he will occasionally take 1-1/2 pills of Lasix to help with this.  I have asked him to discuss this further with primary care. His weight fluctuates and I suspect this is at least in part related to fluid retention.    Diagnoses and all orders for this visit:    1. OMAIRA (obstructive sleep apnea) (Primary)  -     PAP Therapy    2. Class 2 severe obesity due to excess calories with serious comorbidity and body mass index (BMI) of 37.0 to 37.9 in adult           The patient continues to use and benefit from CPAP therapy.    1. The patient was counseled regarding multimodal approach with healthy nutrition, healthy sleep, regular physical activity, social activities, counseling, and medications. Encouraged to practice lateral sleep position. Avoid alcohol and sedatives close to bedtime.     2.  We will refill supplies x1 year.  Return to clinic 1 year or sooner if symptoms warrant. I have reviewed the results of my evaluation and impression and discussed my recommendations in detail with the patient.             Follow Up  Return in about 1 year (around 8/15/2024) for Annual visit.  Patient was given instructions and counseling regarding his condition or for health maintenance advice. Please see specific information pulled into the AVS if appropriate.       BOB Gillette, ACNP-BC  Pulmonology, Critical Care, and Sleep Medicine

## 2023-08-15 ENCOUNTER — OFFICE VISIT (OUTPATIENT)
Dept: SLEEP MEDICINE | Facility: HOSPITAL | Age: 83
End: 2023-08-15
Payer: MEDICARE

## 2023-08-15 VITALS
BODY MASS INDEX: 45.1 KG/M2 | HEIGHT: 70 IN | HEART RATE: 81 BPM | OXYGEN SATURATION: 96 % | DIASTOLIC BLOOD PRESSURE: 70 MMHG | WEIGHT: 315 LBS | SYSTOLIC BLOOD PRESSURE: 157 MMHG

## 2023-08-15 DIAGNOSIS — G47.33 OSA (OBSTRUCTIVE SLEEP APNEA): Primary | ICD-10-CM

## 2023-08-15 DIAGNOSIS — E66.01 CLASS 2 SEVERE OBESITY DUE TO EXCESS CALORIES WITH SERIOUS COMORBIDITY AND BODY MASS INDEX (BMI) OF 37.0 TO 37.9 IN ADULT: ICD-10-CM

## 2023-09-06 ENCOUNTER — TELEPHONE (OUTPATIENT)
Dept: CARDIOLOGY | Facility: CLINIC | Age: 83
End: 2023-09-06
Payer: MEDICARE

## 2023-09-06 NOTE — TELEPHONE ENCOUNTER
Patient states that he has been having lower extremity edema for the last 2 days. He said that he has doubled his dose of Lasix and it is still not helping.    I scheduled him an appointment with the [facility] tomorrow morning at 9:00 am to be evaluated.    I also called in a 2 week supply of Eliquis 5 mg PO BID in to [facility] pharmacy because he has not received his shipment from [facility] yet.

## 2023-09-07 ENCOUNTER — OFFICE VISIT (OUTPATIENT)
Dept: CARDIOLOGY | Facility: HOSPITAL | Age: 83
End: 2023-09-07
Payer: MEDICARE

## 2023-09-07 ENCOUNTER — HOSPITAL ENCOUNTER (OUTPATIENT)
Dept: GENERAL RADIOLOGY | Facility: HOSPITAL | Age: 83
Discharge: HOME | End: 2023-09-07
Payer: MEDICARE

## 2023-09-07 ENCOUNTER — HOSPITAL ENCOUNTER (OUTPATIENT)
Dept: CARDIOLOGY | Facility: HOSPITAL | Age: 83
Discharge: HOME | End: 2023-09-07
Payer: MEDICARE

## 2023-09-07 ENCOUNTER — LAB (OUTPATIENT)
Dept: LAB | Facility: HOSPITAL | Age: 83
End: 2023-09-07
Payer: MEDICARE

## 2023-09-07 ENCOUNTER — TELEPHONE (OUTPATIENT)
Dept: CARDIOLOGY | Facility: HOSPITAL | Age: 83
End: 2023-09-07

## 2023-09-07 VITALS
RESPIRATION RATE: 18 BRPM | WEIGHT: 315 LBS | HEART RATE: 79 BPM | SYSTOLIC BLOOD PRESSURE: 179 MMHG | DIASTOLIC BLOOD PRESSURE: 77 MMHG | OXYGEN SATURATION: 94 % | HEIGHT: 70 IN | BODY MASS INDEX: 45.1 KG/M2 | TEMPERATURE: 97.3 F

## 2023-09-07 VITALS
WEIGHT: 315 LBS | SYSTOLIC BLOOD PRESSURE: 194 MMHG | BODY MASS INDEX: 45.1 KG/M2 | HEIGHT: 70 IN | DIASTOLIC BLOOD PRESSURE: 81 MMHG

## 2023-09-07 DIAGNOSIS — R60.0 LOWER EXTREMITY EDEMA: ICD-10-CM

## 2023-09-07 DIAGNOSIS — R06.09 DYSPNEA ON EXERTION: ICD-10-CM

## 2023-09-07 DIAGNOSIS — R53.83 OTHER FATIGUE: ICD-10-CM

## 2023-09-07 DIAGNOSIS — I10 PRIMARY HYPERTENSION: ICD-10-CM

## 2023-09-07 DIAGNOSIS — R60.0 LOWER EXTREMITY EDEMA: Primary | ICD-10-CM

## 2023-09-07 LAB
ANION GAP SERPL CALCULATED.3IONS-SCNC: 9 MMOL/L (ref 5–15)
BUN SERPL-MCNC: 27 MG/DL (ref 8–23)
BUN/CREAT SERPL: 19.3 (ref 7–25)
CALCIUM SPEC-SCNC: 9.6 MG/DL (ref 8.6–10.5)
CHLORIDE SERPL-SCNC: 106 MMOL/L (ref 98–107)
CO2 SERPL-SCNC: 25 MMOL/L (ref 22–29)
CREAT SERPL-MCNC: 1.4 MG/DL (ref 0.76–1.27)
EGFRCR SERPLBLD CKD-EPI 2021: 50.2 ML/MIN/1.73
GLUCOSE SERPL-MCNC: 142 MG/DL (ref 65–99)
MAGNESIUM SERPL-MCNC: 1.7 MG/DL (ref 1.6–2.4)
NT-PROBNP SERPL-MCNC: 338.2 PG/ML (ref 0–1800)
POTASSIUM SERPL-SCNC: 4.2 MMOL/L (ref 3.5–5.2)
SODIUM SERPL-SCNC: 140 MMOL/L (ref 136–145)

## 2023-09-07 PROCEDURE — 83880 ASSAY OF NATRIURETIC PEPTIDE: CPT

## 2023-09-07 PROCEDURE — 36415 COLL VENOUS BLD VENIPUNCTURE: CPT

## 2023-09-07 PROCEDURE — 80048 BASIC METABOLIC PNL TOTAL CA: CPT

## 2023-09-07 PROCEDURE — 71046 X-RAY EXAM CHEST 2 VIEWS: CPT

## 2023-09-07 PROCEDURE — 25010000002 SULFUR HEXAFLUORIDE MICROSPH 60.7-25 MG RECONSTITUTED SUSPENSION: Performed by: NURSE PRACTITIONER

## 2023-09-07 PROCEDURE — 83735 ASSAY OF MAGNESIUM: CPT

## 2023-09-07 PROCEDURE — 93306 TTE W/DOPPLER COMPLETE: CPT

## 2023-09-07 RX ORDER — BUMETANIDE 2 MG/1
2 TABLET ORAL DAILY
Qty: 30 TABLET | Refills: 1 | Status: SHIPPED | OUTPATIENT
Start: 2023-09-07

## 2023-09-07 RX ORDER — HYDRALAZINE HYDROCHLORIDE 100 MG/1
100 TABLET, FILM COATED ORAL 2 TIMES DAILY
Qty: 180 TABLET | Refills: 1 | Status: SHIPPED | OUTPATIENT
Start: 2023-09-07

## 2023-09-07 RX ADMIN — SULFUR HEXAFLUORIDE 2 ML: KIT at 14:00

## 2023-09-07 NOTE — PROGRESS NOTES
Chief Complaint  Edema and Congestive Heart Failure    Subjective      History of Present Illness {CC  Problem List  Visit  Diagnosis   Encounters  Notes  Medications  Labs  Result Review Imaging  Media :23}     Javier Lerner, 82 y.o. male with past medical history significant for symptomatic bradycardia status post PPM, hypertension, hyperlipidemia, CKD stage III, type 2 diabetes, OMAIRA with CPAP, PAF, and former tobacco abuse, who presents to [facility] for Edema and Congestive Heart Failure  The patient is a established patient with EP Dr. Sewell.  Patient reached out on 9/6 stating that he had been having lower extremity swelling for last 2 days.  Patient stated that he had doubled his dose of Lasix and that had still not helped.    Recently he denies chest pain, palpitations, syncope, or near syncope.  He endorses shortness of air which is continuous, he does have OMAIRA and is compliant with CPAP. He also has continued fatigue. Lower extremity edema has been worsening over the last month, and patient has taken it upon himself to double lasix dose.     He had been followed previously by nephrology (Dr. Jacobs) which has not been recent    He is checking blood pressure at home which has been running 144-181/69-81 with HR running 76-81.     SPECT stress test 9/21/2020:  Compared to the prior study from 10/14/2016  Lexiscan cardiolite within normal limits with diaphragmatic attenuation.  Left ventricular ejection fraction is normal. (Calculated EF = 60%).  Low risk test.        Objective     Vital Signs:   Vitals:    09/07/23 0914 09/07/23 0916 09/07/23 0917   BP: 178/79 (!) 186/83 179/77   BP Location: Right arm Left arm Left arm   Patient Position: Sitting Standing Sitting   Cuff Size: Large Adult Large Adult Large Adult   Pulse: 88 89 79   Resp:   18   Temp: 97.3 °F (36.3 °C) 97.3 °F (36.3 °C) 97.3 °F (36.3 °C)   TempSrc: Temporal Temporal Temporal   SpO2: 94% 94% 94%  "  Weight:   (!) 149 kg (328 lb)   Height:   177.8 cm (70\")     Body mass index is 47.06 kg/m².  Physical Exam  Vitals and nursing note reviewed.   Constitutional:       Appearance: Normal appearance. He is obese.   HENT:      Head: Normocephalic.   Eyes:      Pupils: Pupils are equal, round, and reactive to light.   Cardiovascular:      Rate and Rhythm: Normal rate and regular rhythm.      Pulses: Normal pulses.      Heart sounds: Normal heart sounds. No murmur heard.  Pulmonary:      Effort: Pulmonary effort is normal.      Breath sounds: Normal breath sounds.   Abdominal:      General: Bowel sounds are normal. There is distension.      Palpations: Abdomen is soft.   Musculoskeletal:         General: Normal range of motion.      Cervical back: Normal range of motion.      Right lower leg: 3+ Edema present.      Left lower leg: 3+ Edema present.   Skin:     General: Skin is warm and dry.      Capillary Refill: Capillary refill takes less than 2 seconds.   Neurological:      Mental Status: He is alert and oriented to person, place, and time.   Psychiatric:         Mood and Affect: Mood normal.         Thought Content: Thought content normal.              Data Reviewed:{ Labs  Result Review  Imaging  Med Tab  Media :23}   Lab Results   Component Value Date    WBC 7.24 01/04/2017    HGB 14.7 01/04/2017    HCT 41.6 01/04/2017    MCV 89.8 01/04/2017     01/04/2017      Lab Results   Component Value Date    GLUCOSE 140 (H) 01/23/2023    BUN 22 01/23/2023    CREATININE 1.46 (H) 01/23/2023    EGFR 47.7 (L) 01/23/2023    BCR 15.1 01/23/2023    K 4.2 01/23/2023    CO2 22.4 01/23/2023    CALCIUM 9.7 01/23/2023    ALBUMIN 4.3 01/23/2023    BILITOT 0.4 01/23/2023    AST 30 01/23/2023    ALT 24 01/23/2023            Assessment & Plan   Assessment and Plan {CC Problem List  Visit Diagnosis  ROS  Review (Popup)  Health Maintenance  Quality  BestPractice  Medications  SmartSets  SnapShot Encounters  Media :23} "     1. Primary hypertension  -Hypertension is currently poorly controlled  -We will increase hydralazine from 50 mg twice daily up to 100 mg twice daily  -Patient to continue ambulatory blood pressure monitoring  -He was encouraged to notify our office if systolic blood pressure becomes less than 110 consistently  - proBNP; Future  - Basic Metabolic Panel; Future  - Magnesium; Future  -Patient to keep follow-up with Dr. Sewell next month, further follow-up based on results of patient's testing.    2. Lower extremity edema  -Patient has not had any recent lab values or recent echocardiogram  -We will check patient's BNP, BMP  -We will also obtain updated echocardiogram  -Patient recently increased his dose of Lasix from 40 mg daily up to 80 mg daily.  We will adjust this further based on results of lab work obtained today  - Adult Transthoracic Echo Complete W/ Cont if Necessary Per Protocol; Future  - proBNP; Future  - Basic Metabolic Panel; Future  - Magnesium; Future    3. Dyspnea on exertion  -Dyspnea has been worsening recently  - Adult Transthoracic Echo Complete W/ Cont if Necessary Per Protocol; Future  - XR Chest PA & Lateral; Future  - proBNP; Future  - Basic Metabolic Panel; Future  - Magnesium; Future    4. Other fatigue  -Patient does have OMAIRA but he is compliant with CPAP  - Adult Transthoracic Echo Complete W/ Cont if Necessary Per Protocol; Future  - XR Chest PA & Lateral; Future  - proBNP; Future  - Basic Metabolic Panel; Future  - Magnesium; Future      Follow Up {Instructions Charge Capture  Follow-up Communications :23}     Return if symptoms worsen or fail to improve, for Heart Failure.    Patient was given instructions and counseling regarding his condition or for health maintenance advice. Please see specific information pulled into the AVS if appropriate.  Patient was instructed to call the [facility] with any questions, concerns, or worsening symptoms.    Dictated Utilizing  Dragon Dictation   Please note that portions of this note were completed with a voice recognition program.   Part of this note may be an electronic transcription/translation of spoken language to printed text using the Dragon Dictation System.

## 2023-09-07 NOTE — PATIENT INSTRUCTIONS
Increase hyrdalazine to 100mg BID. May take two tablets twice daily until new bottle arrives.    Continue to monitor blood pressure. If blood pressure gets less than 110 systolic please call to notify clinic.

## 2023-09-07 NOTE — TELEPHONE ENCOUNTER
Called patient to discuss recent lab work/ chest x-ray.  Patient's creatinine is stable at 1.4.  Potassium within normal limits.  X-ray showed small to moderate right pleural effusion.  Instructed patient to stop taking Lasix altogether.  Informed patient that we will start Bumex 2 mg daily.  We will plan to follow-up with patient in 1 month or less to reassess for swelling, and hypertension.  Patient verbalized an understanding and was given the number to heart and valve office to reach out if he has any other questions.

## 2023-09-08 LAB
ASCENDING AORTA: 3.5 CM
BH CV ECHO MEAS - AO MAX PG: 16.9 MMHG
BH CV ECHO MEAS - AO MEAN PG: 7.9 MMHG
BH CV ECHO MEAS - AO ROOT AREA (BSA CORRECTED): 1.3 CM2
BH CV ECHO MEAS - AO ROOT DIAM: 3.4 CM
BH CV ECHO MEAS - AO V2 MAX: 205.6 CM/SEC
BH CV ECHO MEAS - AO V2 VTI: 32.2 CM
BH CV ECHO MEAS - AVA(I,D): 2.32 CM2
BH CV ECHO MEAS - EDV(CUBED): 174.1 ML
BH CV ECHO MEAS - EDV(MOD-SP4): 129 ML
BH CV ECHO MEAS - ESV(CUBED): 90.1 ML
BH CV ECHO MEAS - ESV(MOD-SP4): 84.6 ML
BH CV ECHO MEAS - FS: 19.7 %
BH CV ECHO MEAS - IVS/LVPW: 1.05 CM
BH CV ECHO MEAS - IVSD: 1.09 CM
BH CV ECHO MEAS - LA DIMENSION: 3.8 CM
BH CV ECHO MEAS - LAT PEAK E' VEL: 7 CM/SEC
BH CV ECHO MEAS - LV MASS(C)D: 238.2 GRAMS
BH CV ECHO MEAS - LV MAX PG: 3.8 MMHG
BH CV ECHO MEAS - LV MEAN PG: 1.81 MMHG
BH CV ECHO MEAS - LV V1 MAX: 97.1 CM/SEC
BH CV ECHO MEAS - LV V1 VTI: 23.1 CM
BH CV ECHO MEAS - LVIDD: 5.6 CM
BH CV ECHO MEAS - LVIDS: 4.5 CM
BH CV ECHO MEAS - LVOT AREA: 3.2 CM2
BH CV ECHO MEAS - LVOT DIAM: 2.03 CM
BH CV ECHO MEAS - LVPWD: 1.04 CM
BH CV ECHO MEAS - MED PEAK E' VEL: 6.2 CM/SEC
BH CV ECHO MEAS - MV A MAX VEL: 67.7 CM/SEC
BH CV ECHO MEAS - MV DEC SLOPE: 385.5 CM/SEC2
BH CV ECHO MEAS - MV DEC TIME: 0.23 MSEC
BH CV ECHO MEAS - MV E MAX VEL: 80.1 CM/SEC
BH CV ECHO MEAS - MV E/A: 1.18
BH CV ECHO MEAS - MV MAX PG: 4.1 MMHG
BH CV ECHO MEAS - MV MEAN PG: 1.96 MMHG
BH CV ECHO MEAS - MV P1/2T: 58.9 MSEC
BH CV ECHO MEAS - MV V2 VTI: 27 CM
BH CV ECHO MEAS - MVA(P1/2T): 3.7 CM2
BH CV ECHO MEAS - MVA(VTI): 2.8 CM2
BH CV ECHO MEAS - PA ACC TIME: 0.11 SEC
BH CV ECHO MEAS - SV(LVOT): 74.6 ML
BH CV ECHO MEAS - SV(MOD-SP4): 44.4 ML
BH CV ECHO MEAS - TAPSE (>1.6): 2.26 CM
BH CV ECHO MEAS - TR MAX PG: 31.5 MMHG
BH CV ECHO MEAS - TR MAX VEL: 280.7 CM/SEC
BH CV ECHO MEASUREMENTS AVERAGE E/E' RATIO: 12.14
BH CV XLRA - RV BASE: 4 CM
BH CV XLRA - TDI S': 12.4 CM/SEC

## 2023-09-27 ENCOUNTER — LAB (OUTPATIENT)
Dept: LAB | Facility: HOSPITAL | Age: 83
End: 2023-09-27
Payer: MEDICARE

## 2023-09-27 ENCOUNTER — OFFICE VISIT (OUTPATIENT)
Dept: CARDIOLOGY | Facility: HOSPITAL | Age: 83
End: 2023-09-27
Payer: MEDICARE

## 2023-09-27 VITALS
HEART RATE: 79 BPM | BODY MASS INDEX: 45.1 KG/M2 | HEIGHT: 70 IN | SYSTOLIC BLOOD PRESSURE: 147 MMHG | RESPIRATION RATE: 18 BRPM | TEMPERATURE: 98.9 F | DIASTOLIC BLOOD PRESSURE: 66 MMHG | WEIGHT: 315 LBS | OXYGEN SATURATION: 95 %

## 2023-09-27 DIAGNOSIS — R53.83 OTHER FATIGUE: ICD-10-CM

## 2023-09-27 DIAGNOSIS — R06.09 DYSPNEA ON EXERTION: ICD-10-CM

## 2023-09-27 DIAGNOSIS — R07.89 OTHER CHEST PAIN: Primary | ICD-10-CM

## 2023-09-27 DIAGNOSIS — R60.0 LOWER EXTREMITY EDEMA: ICD-10-CM

## 2023-09-27 DIAGNOSIS — I10 PRIMARY HYPERTENSION: ICD-10-CM

## 2023-09-27 DIAGNOSIS — R07.89 OTHER CHEST PAIN: ICD-10-CM

## 2023-09-27 LAB
ANION GAP SERPL CALCULATED.3IONS-SCNC: 10 MMOL/L (ref 5–15)
BUN SERPL-MCNC: 31 MG/DL (ref 8–23)
BUN/CREAT SERPL: 20.7 (ref 7–25)
CALCIUM SPEC-SCNC: 10 MG/DL (ref 8.6–10.5)
CHLORIDE SERPL-SCNC: 102 MMOL/L (ref 98–107)
CO2 SERPL-SCNC: 28 MMOL/L (ref 22–29)
CREAT SERPL-MCNC: 1.5 MG/DL (ref 0.76–1.27)
EGFRCR SERPLBLD CKD-EPI 2021: 46.2 ML/MIN/1.73
GLUCOSE SERPL-MCNC: 170 MG/DL (ref 65–99)
POTASSIUM SERPL-SCNC: 3.8 MMOL/L (ref 3.5–5.2)
SODIUM SERPL-SCNC: 140 MMOL/L (ref 136–145)

## 2023-09-27 PROCEDURE — 36415 COLL VENOUS BLD VENIPUNCTURE: CPT

## 2023-09-27 PROCEDURE — 80048 BASIC METABOLIC PNL TOTAL CA: CPT

## 2023-09-27 NOTE — PROGRESS NOTES
Chief Complaint  Congestive Heart Failure and Chest Pain    Subjective      History of Present Illness {CC  Problem List  Visit  Diagnosis   Encounters  Notes  Medications  Labs  Result Review Imaging  Media :23}     Javier Lerner, 82 y.o. male with past medical history significant for symptomatic bradycardia status post PPM, hypertension, hyperlipidemia, CKD stage III, type 2 diabetes, OMAIRA with CPAP, PAF, and former tobacco abuse, who presents to [facility] clinic for Congestive Heart Failure and Chest Pain  At time of last evaluation, patient underwent chest x-ray which revealed small to moderate right pleural effusion with right basilar atelectasis.  He has undergone echocardiogram which revealed EF to be 51 to 55%.  After obtaining lab work, patient was started on Bumex at 2 mg daily.  His hydralazine was also increased to 100 mg twice daily due to uncontrolled hypertension.    Since time of last visit, he is checking his blood pressure at home which is running 135/74 to 159/75. HR running 70-80's. He does endorse some chest discomfort which radiates to his back in between his shoulder blades. He has taken NTG on two separate occasions which has improved chest pain. Chest pain is described as a fullness. This is occurring at rest, lasting approximately an hour. He also states his breathing is not any better since time of last visit. He continues to endorse lower extremity edema and fatigue.     Of note, patient has lost 8 pounds since time of last visit.      Echocardiogram 9/7/2023:    Left ventricular ejection fraction appears to be 51 - 55%.    The left ventricular cavity is borderline dilated.    The left atrial cavity is mildly dilated.    There is calcification of the aortic valve.    Estimated right ventricular systolic pressure from tricuspid regurgitation is mildly elevated (35-45 mmHg).    Mild to moderate pulmonary hypertension is present.      Initial  "evaluation:  The patient is a established patient with EP Dr. Sewell.  Patient reached out on 9/6 stating that he had been having lower extremity swelling for last 2 days.  Patient stated that he had doubled his dose of Lasix and that had still not helped.    Recently he denies chest pain, palpitations, syncope, or near syncope.  He endorses shortness of air which is continuous, he does have OMAIRA and is compliant with CPAP. He also has continued fatigue. Lower extremity edema has been worsening over the last month, and patient has taken it upon himself to double lasix dose.     He had been followed previously by nephrology (Dr. Jacobs) which has not been recent    He is checking blood pressure at home which has been running 144-181/69-81 with HR running 76-81.     SPECT stress test 9/21/2020:  Compared to the prior study from 10/14/2016  Lexiscan cardiolite within normal limits with diaphragmatic attenuation.  Left ventricular ejection fraction is normal. (Calculated EF = 60%).  Low risk test.        Objective     Vital Signs:   Vitals:    09/27/23 1312   BP: 147/66   BP Location: Left arm   Patient Position: Sitting   Cuff Size: Adult   Pulse: 79   Resp: 18   Temp: 98.9 °F (37.2 °C)   TempSrc: Temporal   SpO2: 95%   Weight: (!) 145 kg (320 lb 9.6 oz)   Height: 177.8 cm (70\")       Body mass index is 46 kg/m².  Physical Exam  Vitals and nursing note reviewed.   Constitutional:       Appearance: Normal appearance. He is obese.   HENT:      Head: Normocephalic.   Eyes:      Pupils: Pupils are equal, round, and reactive to light.   Cardiovascular:      Rate and Rhythm: Normal rate and regular rhythm.      Pulses: Normal pulses.      Heart sounds: Normal heart sounds. No murmur heard.  Pulmonary:      Effort: Pulmonary effort is normal.      Breath sounds: Normal breath sounds.   Abdominal:      General: Bowel sounds are normal. There is distension.      Palpations: Abdomen is soft.   Musculoskeletal:         General: " Normal range of motion.      Cervical back: Normal range of motion.      Right lower le+ Edema present.      Left lower le+ Edema present.   Skin:     General: Skin is warm and dry.      Capillary Refill: Capillary refill takes less than 2 seconds.   Neurological:      Mental Status: He is alert and oriented to person, place, and time.   Psychiatric:         Mood and Affect: Mood normal.         Thought Content: Thought content normal.              Data Reviewed:{ Labs  Result Review  Imaging  Med Tab  Media :23}   Lab Results   Component Value Date    WBC 7.24 2017    HGB 14.7 2017    HCT 41.6 2017    MCV 89.8 2017     2017      Lab Results   Component Value Date    GLUCOSE 142 (H) 2023    BUN 27 (H) 2023    CREATININE 1.40 (H) 2023    EGFR 50.2 (L) 2023    BCR 19.3 2023    K 4.2 2023    CO2 25.0 2023    CALCIUM 9.6 2023    ALBUMIN 4.3 2023    BILITOT 0.4 2023    AST 30 2023    ALT 24 2023            Assessment & Plan   Assessment and Plan {CC Problem List  Visit Diagnosis  ROS  Review (Popup)  Health Maintenance  Quality  BestPractice  Medications  SmartSets  SnapShot Encounters  Media :23}     1. Primary hypertension  -Hypertension is now more adequately controlled  -Increased hydralazine from 50 mg twice daily up to 100 mg twice daily  -Patient to continue ambulatory blood pressure monitoring  -He was encouraged to notify our office if systolic blood pressure is higher than 140 consistently  -Echocardiogram 2023:    Left ventricular ejection fraction appears to be 51 - 55%.    The left ventricular cavity is borderline dilated.    The left atrial cavity is mildly dilated.    There is calcification of the aortic valve.    Estimated right ventricular systolic pressure from tricuspid regurgitation is mildly elevated (35-45 mmHg).    Mild to moderate pulmonary hypertension is  present.  -Discussed results of echocardiogram with patient while in office today  -Plan to follow-up in approximately 4 weeks via video visit for further assessment of hypertension and medication adjustments    2. Lower extremity edema  -Most recent lab work reviewed  -Patient was placed on Bumex 2 mg daily at time of last visit  -We will repeat BMP today to assess for kidney function and potassium    3. Dyspnea on exertion  -Dyspnea has been worsening recently  -Most recent chest x-ray reviewed which revealed small to moderate right pleural effusion with right basilar atelectasis  -Patient continues to state he is short of air  -We will now pursue ischemic work-up to evaluate for possible causes of shortness of air    4. Other fatigue  -Patient does have OMAIRA but he is compliant with CPAP  - Echocardiogram 9/7/2023:    Left ventricular ejection fraction appears to be 51 - 55%.    The left ventricular cavity is borderline dilated.    The left atrial cavity is mildly dilated.    There is calcification of the aortic valve.    Estimated right ventricular systolic pressure from tricuspid regurgitation is mildly elevated (35-45 mmHg).    Mild to moderate pulmonary hypertension is present.      5.  Other chest pain  -Chest pain is described as atypical, however this is new and requires further evaluation  -On 2 separate occasions, patient has taken nitroglycerin which has improved chest pain  -Last stress test was noted to be in 2020  -We will obtain PET stress test to evaluate for any ischemic causes for patient's symptoms of chest pain and shortness of air      Follow Up {Instructions Charge Capture  Follow-up Communications :23}     Return in about 4 weeks (around 10/25/2023) for Video visit, Chest pain, Result review.    Patient was given instructions and counseling regarding his condition or for health maintenance advice. Please see specific information pulled into the AVS if appropriate.  Patient was instructed  to call the Heart and Valve Center with any questions, concerns, or worsening symptoms.    Dictated Utilizing Dragon Dictation   Please note that portions of this note were completed with a voice recognition program.   Part of this note may be an electronic transcription/translation of spoken language to printed text using the Dragon Dictation System.

## 2023-09-28 NOTE — PROGRESS NOTES
I received the results of your blood work from yesterday 9/27. Your kidney function is slightly increased, but this is not much higher than it was back in January of this year. Your potassium level is also stable. Let's continue your current medication regimen. If you have any questions or concerns please let me know.       Summer ROJAS

## 2023-10-03 ENCOUNTER — HOSPITAL ENCOUNTER (OUTPATIENT)
Dept: CARDIOLOGY | Facility: HOSPITAL | Age: 83
Discharge: HOME | End: 2023-10-03
Payer: MEDICARE

## 2023-10-03 DIAGNOSIS — R07.89 OTHER CHEST PAIN: ICD-10-CM

## 2023-10-03 DIAGNOSIS — R60.0 LOWER EXTREMITY EDEMA: ICD-10-CM

## 2023-10-03 DIAGNOSIS — R06.09 DYSPNEA ON EXERTION: ICD-10-CM

## 2023-10-03 DIAGNOSIS — R53.83 OTHER FATIGUE: ICD-10-CM

## 2023-10-04 ENCOUNTER — HOSPITAL ENCOUNTER (OUTPATIENT)
Dept: CARDIOLOGY | Facility: HOSPITAL | Age: 83
Discharge: HOME | End: 2023-10-04
Admitting: NURSE PRACTITIONER
Payer: MEDICARE

## 2023-10-04 VITALS
OXYGEN SATURATION: 95 % | HEIGHT: 70 IN | SYSTOLIC BLOOD PRESSURE: 162 MMHG | HEART RATE: 82 BPM | WEIGHT: 315 LBS | DIASTOLIC BLOOD PRESSURE: 59 MMHG | BODY MASS INDEX: 45.1 KG/M2

## 2023-10-04 PROCEDURE — 0 RUBIDIUM CHLORIDE: Performed by: NURSE PRACTITIONER

## 2023-10-04 PROCEDURE — 25010000002 REGADENOSON 0.4 MG/5ML SOLUTION: Performed by: NURSE PRACTITIONER

## 2023-10-04 PROCEDURE — 78431 MYOCRD IMG PET RST&STRS CT: CPT

## 2023-10-04 PROCEDURE — A9555 RB82 RUBIDIUM: HCPCS | Performed by: NURSE PRACTITIONER

## 2023-10-04 PROCEDURE — 93017 CV STRESS TEST TRACING ONLY: CPT

## 2023-10-04 RX ORDER — REGADENOSON 0.08 MG/ML
0.4 INJECTION, SOLUTION INTRAVENOUS ONCE
Status: COMPLETED | OUTPATIENT
Start: 2023-10-04 | End: 2023-10-04

## 2023-10-04 RX ADMIN — RUBIDIUM CHLORIDE RB-82 1 DOSE: 150 INJECTION, SOLUTION INTRAVENOUS at 09:08

## 2023-10-04 RX ADMIN — REGADENOSON 0.4 MG: 0.08 INJECTION, SOLUTION INTRAVENOUS at 09:14

## 2023-10-04 RX ADMIN — RUBIDIUM CHLORIDE RB-82 1 DOSE: 150 INJECTION, SOLUTION INTRAVENOUS at 09:19

## 2023-10-05 LAB
BH CV REST NUCLEAR ISOTOPE DOSE: 33 MCI
BH CV STRESS BP STAGE 1: NORMAL
BH CV STRESS BP STAGE 2: NORMAL
BH CV STRESS BP STAGE 4: NORMAL
BH CV STRESS COMMENTS STAGE 1: NORMAL
BH CV STRESS DOSE REGADENOSON STAGE 1: 0.4
BH CV STRESS DURATION MIN STAGE 1: 1
BH CV STRESS DURATION MIN STAGE 2: 1
BH CV STRESS DURATION MIN STAGE 3: 1
BH CV STRESS DURATION MIN STAGE 4: 1
BH CV STRESS HR STAGE 1: 85
BH CV STRESS HR STAGE 2: 93
BH CV STRESS HR STAGE 3: 85
BH CV STRESS HR STAGE 4: 86
BH CV STRESS NUCLEAR ISOTOPE DOSE: 33 MCI
BH CV STRESS O2 STAGE 1: 94
BH CV STRESS O2 STAGE 2: 96
BH CV STRESS O2 STAGE 3: 96
BH CV STRESS O2 STAGE 4: 96
BH CV STRESS PROTOCOL 1: NORMAL
BH CV STRESS RECOVERY BP: NORMAL MMHG
BH CV STRESS RECOVERY HR: 83 BPM
BH CV STRESS RECOVERY O2: 96 %
BH CV STRESS STAGE 1: 1
BH CV STRESS STAGE 2: 2
BH CV STRESS STAGE 3: 3
BH CV STRESS STAGE 4: 4
MAXIMAL PREDICTED HEART RATE: 138 BPM
PERCENT MAX PREDICTED HR: 81.16 %
STRESS BASELINE BP: NORMAL MMHG
STRESS BASELINE HR: 80 BPM
STRESS O2 SAT REST: 98 %
STRESS PERCENT HR: 95 %
STRESS POST EXERCISE DUR MIN: 4 MIN
STRESS POST EXERCISE DUR SEC: 0 SEC
STRESS POST O2 SAT PEAK: 95 %
STRESS POST PEAK BP: NORMAL MMHG
STRESS POST PEAK HR: 112 BPM
STRESS TARGET HR: 117 BPM

## 2023-10-16 ENCOUNTER — OFFICE VISIT (OUTPATIENT)
Dept: CARDIOLOGY | Facility: CLINIC | Age: 83
End: 2023-10-16
Payer: MEDICARE

## 2023-10-16 VITALS
SYSTOLIC BLOOD PRESSURE: 118 MMHG | WEIGHT: 315 LBS | BODY MASS INDEX: 44.1 KG/M2 | HEART RATE: 78 BPM | OXYGEN SATURATION: 93 % | DIASTOLIC BLOOD PRESSURE: 48 MMHG | HEIGHT: 71 IN

## 2023-10-16 DIAGNOSIS — I10 PRIMARY HYPERTENSION: ICD-10-CM

## 2023-10-16 DIAGNOSIS — E11.9 DIABETES MELLITUS WITH INSULIN THERAPY: ICD-10-CM

## 2023-10-16 DIAGNOSIS — Z79.4 DIABETES MELLITUS WITH INSULIN THERAPY: ICD-10-CM

## 2023-10-16 DIAGNOSIS — Z95.0 PRESENCE OF CARDIAC PACEMAKER: ICD-10-CM

## 2023-10-16 DIAGNOSIS — I48.0 PAROXYSMAL ATRIAL FIBRILLATION: ICD-10-CM

## 2023-10-16 DIAGNOSIS — I45.89 CHRONOTROPIC INCOMPETENCE: Primary | ICD-10-CM

## 2023-10-16 DIAGNOSIS — G47.33 OSA (OBSTRUCTIVE SLEEP APNEA): ICD-10-CM

## 2023-10-16 PROBLEM — I27.20 PULMONARY HTN: Status: ACTIVE | Noted: 2023-10-16

## 2023-10-16 PROCEDURE — 99213 OFFICE O/P EST LOW 20 MIN: CPT | Performed by: PHYSICIAN ASSISTANT

## 2023-10-16 PROCEDURE — 3074F SYST BP LT 130 MM HG: CPT | Performed by: PHYSICIAN ASSISTANT

## 2023-10-16 PROCEDURE — 3078F DIAST BP <80 MM HG: CPT | Performed by: PHYSICIAN ASSISTANT

## 2023-10-16 PROCEDURE — 1160F RVW MEDS BY RX/DR IN RCRD: CPT | Performed by: PHYSICIAN ASSISTANT

## 2023-10-16 PROCEDURE — 93280 PM DEVICE PROGR EVAL DUAL: CPT | Performed by: PHYSICIAN ASSISTANT

## 2023-10-16 PROCEDURE — 1159F MED LIST DOCD IN RCRD: CPT | Performed by: PHYSICIAN ASSISTANT

## 2023-10-16 RX ORDER — INSULIN HUMAN 500 [IU]/ML
INJECTION, SOLUTION SUBCUTANEOUS
Qty: 42 ML | Refills: 3 | Status: SHIPPED | OUTPATIENT
Start: 2023-10-16

## 2023-10-16 RX ORDER — METOPROLOL TARTRATE 50 MG/1
50 TABLET, FILM COATED ORAL 2 TIMES DAILY
Qty: 60 TABLET | Refills: 11 | Status: SHIPPED | OUTPATIENT
Start: 2023-10-16

## 2023-10-16 NOTE — PROGRESS NOTES
Encounter Date:10/16/2023      Patient ID: Javier Lerner is a 82 y.o. male.    Javier Kirkpatrick MD    Chief Complaint: Severe sinus bradycardia and Pacemaker Check      PROBLEM LIST:  Patient Active Problem List    Diagnosis Date Noted    Paroxysmal atrial fibrillation 10/16/2023     Priority: High     Note Last Updated: 10/16/2023     [id number] 5  Echo, 9/7/2023: EF 50-55%.  Dilated left atrium.      Chronotropic incompetence 01/05/2017     Priority: High     Note Last Updated: 10/16/2023     Biotronik dual-chamber permanent pacemaker, 1/5/2017      Presence of cardiac pacemaker 10/16/2023     Priority: Medium    OMAIRA (obstructive sleep apnea) 09/19/2016     Priority: Low     Note Last Updated: 10/16/2023      Bipap      Hypertension 09/19/2016     Priority: Low     Note Last Updated: 9/22/2016 8/16/2013, Lexiscan: Myocardial perfusion imaging normal with EF 70%       Pulmonary HTN 10/16/2023    Type 2 diabetes mellitus 06/25/2019    Chronic fatigue 09/22/2016    CKD (chronic kidney disease) stage 3, GFR 30-59 ml/min     Class 3 severe obesity with body mass index (BMI) of 40.0 to 44.9 in adult     Uncontrolled diabetes mellitus with hyperglycemia, with long-term current use of insulin 09/19/2016     Note Last Updated: 9/22/2016            Lower urinary tract symptoms due to benign prostatic hyperplasia 07/27/2016    Raised prostate specific antigen 07/27/2016               History of Present Illness  Patient presents today for follow-up with a history of chronotropic incompetence with a dual-chamber permanent pacemaker, paroxysmal atrial fibrillation and obstructive sleep apnea.  He returns today for scheduled EP follow-up.  He has occasional awareness of palpitations which are self-limiting.  His biggest complaint today is poorly controlled blood pressure averaging between 160 to 180 mmHg systolic.  States his hydralazine was recently increased by the heart and valve clinic.  He had  a recent stress test and echocardiogram.  His stress test was unremarkable.  His echocardiogram shows some mild to moderate pulmonary hypertension.  He states compliance with his current medical regimen reports no significant adverse side effects.          Allergies   Allergen Reactions    Penicillins Other (See Comments)     WELTS ON HANDS AND TURN PURPLE    Tetracyclines & Related Other (See Comments)     Blisters on skin       Current Outpatient Medications   Medication Instructions    acetaminophen (TYLENOL) 1,000 mg, Daily    alfuzosin (UROXATRAL) 10 mg, Oral, Daily    apixaban (ELIQUIS) 5 mg, Oral, 2 Times Daily    B-D UF III MINI PEN NEEDLES 31G X 5 MM misc USE THREE TIMES A DAY WITH INSULIN    bumetanide (BUMEX) 2 mg, Oral, Daily    cholecalciferol (VITAMIN D3) 1,000 Units, Oral, Daily    Continuous Blood Gluc  (FreeStyle Hernan 14 Day [place]) device 1 application , Does not apply, As Needed, Dx: E11.9    Continuous Blood Gluc Sensor (FreeStyle Hernan 14 Day Sensor) misc 1 application , Does not apply, As Needed, Dx: E11.9    fenofibrate 160 MG tablet TAKE 1 TABLET EVERY MORNING    glucose blood test strip OneTouch Verio test strips   USE TO TEST BLOOD GLUCOSE 3 TIMES DAILY e11.65    hydrALAZINE (APRESOLINE) 100 mg, Oral, 2 Times Daily    Insulin Regular Human, Conc, (HumuLIN R U-500 KwikPen) 500 UNIT/ML solution pen-injector CONCENTRATED injection INJECT 80 UNITS THREE TIMES A DAY    metoprolol tartrate (LOPRESSOR) 25 MG tablet TAKE 1 TABLET TWICE A DAY    multivitamins-minerals (PRESERVISION AREDS 2) capsule capsule 1 capsule, Oral, 2 Times Daily    nitroglycerin (NITROSTAT) 0.4 mg, Sublingual, Every 5 Minutes PRN, Take no more than 3 doses in 15 minutes.     OneTouch Verio test strip No dose, route, or frequency recorded.    simvastatin (ZOCOR) 20 MG tablet TAKE 1 TABLET EVERY NIGHT    traMADol (ULTRAM) 50 mg, Oral, Every 8 Hours PRN    triamcinolone (KENALOG) 0.1 % cream 1 application , Topical, 2  "Times Daily       .    Objective:     /48 (BP Location: Left arm, Patient Position: Sitting)   Pulse 78   Ht 180.3 cm (71\")   Wt (!) 145 kg (319 lb)   SpO2 93%   BMI 44.49 kg/m²    Body mass index is 44.49 kg/m².     Constitutional:       Appearance: Well-developed.   Pulmonary:      Effort: Pulmonary effort is normal. No respiratory distress.      Breath sounds: Normal breath sounds. No wheezing. No rales.      Comments: Bases clear  Chest:      Chest wall: Not tender to palpatation.   Cardiovascular:      Normal rate. Regular rhythm.      Murmurs: There is no murmur.      No gallop.  No click. No rub.   Pulses:     Intact distal pulses.   Edema:     Peripheral edema present.  Musculoskeletal: Normal range of motion.       Lab Review:   Lab Results   Component Value Date    GLUCOSE 170 (H) 09/27/2023    BUN 31 (H) 09/27/2023    CREATININE 1.50 (H) 09/27/2023    EGFR 46.2 (L) 09/27/2023    BCR 20.7 09/27/2023    K 3.8 09/27/2023    CO2 28.0 09/27/2023    CALCIUM 10.0 09/27/2023    ALBUMIN 4.3 01/23/2023    BILITOT 0.4 01/23/2023    AST 30 01/23/2023    ALT 24 01/23/2023           Procedures               Assessment:      Diagnosis Plan   1. Chronotropic incompetence  Normal functioning dual-chamber permanent pacemaker with 91% right atrial and 2% right ventricular pacing.  Normal lead parameters.  4 years battery life remaining.      2. Presence of cardiac pacemaker    This patient's Cardiac Implanted Electronic Device was manually interrogated and reprogrammed during the patient encounter today.  Iterative programming changes were manually made to determine the sensing threshold, pacing threshold, lead impedance as well as underlying cardiac rhythm.  These programming changes were not limited to but included some or all of the following when appropriate: pacing mode, programmed AV delays, blanking periods, and refractory periods.  Data obtained as a result of these manual programing changes informed the " patient's CIED permanent programming.        3. Primary hypertension  Recently elevated but ideal today.      4. Paroxysmal atrial fibrillation  27 hours of total atrial fibrillation on device interrogation.  The longest episode was 35 minutes.  Fortunately he is minimally symptomatic.  I am increasing his metoprolol tartrate to 50 mg twice daily      5. OMAIRA (obstructive sleep apnea)  BiPAP compliant        Plan:     Stable cardiac status.  Continue current medications.   in 6 months, sooner as needed.  Thank you for allowing us to participate in the care of your patient.     Electronically signed by EVELIO Wagner, 10/16/23, 2:48 PM EDT.

## 2023-10-16 NOTE — TELEPHONE ENCOUNTER
Rx Refill Note  Requested Prescriptions     Pending Prescriptions Disp Refills    Insulin Regular Human, Conc, (HumuLIN R U-500 KwikPen) 500 UNIT/ML solution pen-injector CONCENTRATED injection [Pharmacy Med Name: HUMULIN R KWIKPEN CONC 3ML 2'S 500U/ML] 42 mL 3     Sig: INJECT 80 UNITS THREE TIMES A DAY          Last office visit with prescribing clinician: 4/26/2023     Next office visit with prescribing clinician: 10/26/2023         Natalee Nguyen MA  10/16/23, 09:04 EDT

## 2023-10-30 NOTE — PROGRESS NOTES
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  Motor homes to [facility]     PROBLEM LIST:  CAD  MPS 10/16: normal perfusion with no evidence of ischemia, EF 67%.  9/20 Lexiscan wnl w diaphragm attn EF 60%  Echo 9/2023: LVEF 51-55%, mild to mod pulm hypertension  proBNP normal 9/23  Fanny PET 10/5/2023 no fixed/reversible perfusion defect, diffuse multivessel coronary calcifications noted  Symptomatic bradycardia  Hesselson: holter avg rate mid 40's and planned PPM in January  Echo 10/16: LVEF >70, Anatomically and functionally normal valves  BTK ppm 1/17- abh lower rate 70  HTN  HLD  7/2018   HDL 30 LDL 46  1/23 138/177/41/67  Chronic fatigue  CKD, stage 3, GFR 30-59   9/23 1.5  Obesity  DMT2  A1c 9-7.2 2017 4/23 A1c 7.8  OMAIRA with BiPAP  Tobacco use - cessation 3 years ago  PAF  [id number] 4, a/c w Xarelto          CC:   Chief Complaint   Patient presents with    Atrial Fibrillation    HERNÁNDEZ (dyspnea on exertion)    Edema    Hypertension       Allergies  Allergies   Allergen Reactions    Penicillins Other (See Comments)     WELTS ON HANDS AND TURN PURPLE    Tetracyclines & Related Other (See Comments)     Blisters on skin       Current Medications    Current Outpatient Medications:     alfuzosin (UROXATRAL) 10 MG 24 hr tablet, Take 1 tablet by mouth Daily., Disp: , Rfl:     apixaban (ELIQUIS) 5 MG tablet tablet, Take 1 tablet by mouth 2 (Two) Times a Day., Disp: 180 tablet, Rfl: 2    B-D UF III MINI PEN NEEDLES 31G X 5 MM misc, USE THREE TIMES A DAY WITH INSULIN, Disp: 270 each, Rfl: 3    bumetanide (BUMEX) 2 MG tablet, Take 0.5 tablets by mouth Daily., Disp: 30 tablet, Rfl: 1    cholecalciferol (VITAMIN D3)  25 MCG (1000 UT) tablet, Take 1 tablet by mouth Daily., Disp: , Rfl:     Continuous Blood Gluc  (FreeStyle Hernan 14 Day [place]) device, 1 application As Needed (test glucose). Dx: E11.9, Disp: 1 each, Rfl: 24    Continuous Blood Gluc Sensor (FreeStyle Hernan 14 Day Sensor) misc, 1 application As Needed (test glucose). Dx: E11.9, Disp: 1 each, Rfl: 24    fenofibrate 160 MG tablet, TAKE 1 TABLET EVERY MORNING, Disp: 90 tablet, Rfl: 3    glucose blood test strip, OneTouch Verio test strips  USE TO TEST BLOOD GLUCOSE 3 TIMES DAILY e11.65, Disp: , Rfl:     hydrALAZINE (APRESOLINE) 100 MG tablet, Take 1 tablet by mouth 2 (Two) Times a Day., Disp: 180 tablet, Rfl: 1    Insulin Regular Human, Conc, (HumuLIN R U-500 KwikPen) 500 UNIT/ML solution pen-injector CONCENTRATED injection, INJECT 80 UNITS THREE TIMES A DAY, Disp: 42 mL, Rfl: 3    metoprolol tartrate (LOPRESSOR) 50 MG tablet, Take 1 tablet by mouth 2 (Two) Times a Day., Disp: 60 tablet, Rfl: 11    multivitamins-minerals (PRESERVISION AREDS 2) capsule capsule, Take 1 capsule by mouth 2 (Two) Times a Day., Disp: , Rfl:     nitroglycerin (NITROSTAT) 0.4 MG SL tablet, Place 1 tablet under the tongue Every 5 (Five) Minutes As Needed for Chest Pain. Take no more than 3 doses in 15 minutes., Disp: 100 tablet, Rfl: 1    OneTouch Verio test strip, , Disp: , Rfl:     simvastatin (ZOCOR) 20 MG tablet, TAKE 1 TABLET EVERY NIGHT, Disp: 90 tablet, Rfl: 3    triamcinolone (KENALOG) 0.1 % cream, Apply 1 application topically to the appropriate area as directed 2 (Two) Times a Day., Disp: 45 g, Rfl: 3      History of Present Illness   Javier Lerner is a 82 y.o. year old male here for follow up on coronary artery disease, shortness of breath, lower extremity edema and cardiac risk factor management.  His atrial fibrillation, chronotropic incompetence and cardiac pacemaker management is followed by EP.  Notes progressive dyspnea and failure to thrive.  He has adjusted his  "hydralazine most recently with improvement in blood pressure  He notes his A1c typically remains around 8.0        OBJECTIVE:  Vitals:    11/02/23 0912   BP: 152/58   BP Location: Left arm   Patient Position: Sitting   Pulse: 69   SpO2: 96%   Weight: (!) 145 kg (319 lb 6.4 oz)   Height: 180.3 cm (71\")     Body mass index is 44.55 kg/m².    Constitutional:       Appearance: Not in distress.      Comments: Diffusely edematous with third spacing   Neck:      Vascular: No JVR. JVD normal.   Pulmonary:      Effort: Pulmonary effort is normal.      Breath sounds: Normal breath sounds. No wheezing. No rhonchi. No rales.   Chest:      Chest wall: Not tender to palpatation.   Cardiovascular:      PMI at left midclavicular line. Normal rate. Regular rhythm. Normal S1. Normal S2.       Murmurs: There is a systolic murmur.      No gallop.  No click. No rub.   Pulses:     Intact distal pulses.   Edema:     Peripheral edema absent.   Abdominal:      General: Bowel sounds are normal.      Palpations: Abdomen is soft.      Tenderness: There is no abdominal tenderness.   Musculoskeletal: Normal range of motion.         General: No tenderness. Skin:     General: Skin is warm and dry.   Neurological:      General: No focal deficit present.      Mental Status: Alert and oriented to person, place and time.         Diagnostic Data:  Procedures        ASSESSMENT:   Diagnosis Plan   1. Acute on chronic diastolic CHF (congestive heart failure)  Basic Metabolic Panel      2. Acute on chronic diastolic congestive heart failure        3. Primary hypertension        4. Mixed hyperlipidemia        5. Type 2 diabetes mellitus without complication, with long-term current use of insulin            PLAN:  Acute on chronic diastolic heart failure we will add Jardiance decrease bumetanide to 1 mg daily he will have BMP in 2 weeks    Hypertension largely systolics of 130-140at home we will follow his blood work in 2 weeks    Dyslipidemia controlled on " statin/fibrate therapy    Diabetes on sliding scale insulin we will add Leonardo King MD, FACC

## 2023-11-01 RX ORDER — BUMETANIDE 2 MG/1
2 TABLET ORAL DAILY
Qty: 30 TABLET | Refills: 1 | OUTPATIENT
Start: 2023-11-01

## 2023-11-02 ENCOUNTER — OFFICE VISIT (OUTPATIENT)
Dept: CARDIOLOGY | Facility: CLINIC | Age: 83
End: 2023-11-02
Payer: MEDICARE

## 2023-11-02 VITALS
WEIGHT: 315 LBS | DIASTOLIC BLOOD PRESSURE: 58 MMHG | OXYGEN SATURATION: 96 % | HEART RATE: 69 BPM | BODY MASS INDEX: 44.1 KG/M2 | HEIGHT: 71 IN | SYSTOLIC BLOOD PRESSURE: 152 MMHG

## 2023-11-02 DIAGNOSIS — E78.2 MIXED HYPERLIPIDEMIA: ICD-10-CM

## 2023-11-02 DIAGNOSIS — I50.33 ACUTE ON CHRONIC DIASTOLIC CHF (CONGESTIVE HEART FAILURE): Primary | ICD-10-CM

## 2023-11-02 DIAGNOSIS — I10 PRIMARY HYPERTENSION: ICD-10-CM

## 2023-11-02 DIAGNOSIS — Z79.4 DIABETES MELLITUS WITH INSULIN THERAPY: ICD-10-CM

## 2023-11-02 DIAGNOSIS — E11.9 DIABETES MELLITUS WITH INSULIN THERAPY: ICD-10-CM

## 2023-11-02 DIAGNOSIS — E11.9 TYPE 2 DIABETES MELLITUS WITHOUT COMPLICATION, WITH LONG-TERM CURRENT USE OF INSULIN: ICD-10-CM

## 2023-11-02 DIAGNOSIS — I50.33 ACUTE ON CHRONIC DIASTOLIC CONGESTIVE HEART FAILURE: ICD-10-CM

## 2023-11-02 DIAGNOSIS — Z79.4 TYPE 2 DIABETES MELLITUS WITHOUT COMPLICATION, WITH LONG-TERM CURRENT USE OF INSULIN: ICD-10-CM

## 2023-11-02 RX ORDER — FLURBIPROFEN SODIUM 0.3 MG/ML
SOLUTION/ DROPS OPHTHALMIC
Qty: 270 EACH | Refills: 3 | Status: SHIPPED | OUTPATIENT
Start: 2023-11-02

## 2023-11-02 RX ORDER — BUMETANIDE 2 MG/1
1 TABLET ORAL DAILY
Qty: 30 TABLET | Refills: 1 | Status: SHIPPED | OUTPATIENT
Start: 2023-11-02

## 2023-11-06 ENCOUNTER — TELEPHONE (OUTPATIENT)
Dept: CARDIOLOGY | Facility: CLINIC | Age: 83
End: 2023-11-06
Payer: MEDICARE

## 2023-11-06 NOTE — TELEPHONE ENCOUNTER
Caller: FABIO TALBOT     Relationship:SELF    Callback number: [phone]    Is it ok to leave a message: [x] Yes [] No    Requested medication for samples: JARDIANCE 10 MG     How much medication does the patient currently have left: 1 DAY REMAINING     Who will be picking up the samples: SELF    Do you need information about patient financial assistance for this medication: [] Yes [x] No    Additional details provided: PATIENT CALLED IN TO SEE IF WE HAVE ANYMORE SAMPLES OF JARDIANCE 10 MG, PATIENT STATED HAT [employer] ADVISED HIM THAT THEY ARE NEEDING SOMETIME OF CONTACT FROM OFFICE IN ORDER TO SEND OUT MEDICATION TO PATIENT. PATIENT WAS ADVISED TO EITHER GET SAMPLES FROM OFFICE OR TO HAVE DR. ROMERO SEND ENOUGH MEDICATION TO LAST PATIENT UNTIL [facility] GET MEDICATION READY FOR HIM.     IF NO OTHER SAMPLES ARE LEFT, PATIENT WOULD LIKE MEDICATION SENT TO THE [facility]

## 2023-11-07 ENCOUNTER — LAB (OUTPATIENT)
Dept: LAB | Facility: HOSPITAL | Age: 83
End: 2023-11-07
Payer: MEDICARE

## 2023-11-07 DIAGNOSIS — I50.33 ACUTE ON CHRONIC DIASTOLIC CHF (CONGESTIVE HEART FAILURE): ICD-10-CM

## 2023-11-07 PROCEDURE — 80048 BASIC METABOLIC PNL TOTAL CA: CPT

## 2023-11-07 PROCEDURE — 36415 COLL VENOUS BLD VENIPUNCTURE: CPT

## 2023-11-08 LAB
ANION GAP SERPL CALCULATED.3IONS-SCNC: 13 MMOL/L (ref 5–15)
BUN SERPL-MCNC: 38 MG/DL (ref 8–23)
BUN/CREAT SERPL: 18.7 (ref 7–25)
CALCIUM SPEC-SCNC: 9.8 MG/DL (ref 8.6–10.5)
CHLORIDE SERPL-SCNC: 103 MMOL/L (ref 98–107)
CO2 SERPL-SCNC: 25 MMOL/L (ref 22–29)
CREAT SERPL-MCNC: 2.03 MG/DL (ref 0.76–1.27)
EGFRCR SERPLBLD CKD-EPI 2021: 32.1 ML/MIN/1.73
GLUCOSE SERPL-MCNC: 206 MG/DL (ref 65–99)
POTASSIUM SERPL-SCNC: 3.9 MMOL/L (ref 3.5–5.2)
SODIUM SERPL-SCNC: 141 MMOL/L (ref 136–145)

## 2023-11-09 ENCOUNTER — TELEPHONE (OUTPATIENT)
Dept: CARDIOLOGY | Facility: CLINIC | Age: 83
End: 2023-11-09
Payer: MEDICARE

## 2023-11-09 DIAGNOSIS — N18.30 STAGE 3 CHRONIC KIDNEY DISEASE, UNSPECIFIED WHETHER STAGE 3A OR 3B CKD: ICD-10-CM

## 2023-11-09 DIAGNOSIS — I48.0 PAROXYSMAL ATRIAL FIBRILLATION: ICD-10-CM

## 2023-11-09 DIAGNOSIS — I10 PRIMARY HYPERTENSION: Primary | ICD-10-CM

## 2023-11-09 NOTE — TELEPHONE ENCOUNTER
Per jkc - Less dyspneic w Jardiance?   Renal function creat trended 1.5 to 2.0   Avoid additional bumetanide   Fu bmp in 2 weeks     Patient states swelling legs have gotten better.   Educated to not take any additional bumex   Lab orders placed and patient aware to go get them collected in about 2 weeks    ,

## 2023-11-21 RX ORDER — SIMVASTATIN 20 MG
TABLET ORAL
Qty: 90 TABLET | Refills: 3 | Status: SHIPPED | OUTPATIENT
Start: 2023-11-21

## 2023-12-15 ENCOUNTER — DOCUMENTATION (OUTPATIENT)
Dept: HOME HEALTH SERVICES | Facility: HOME HEALTHCARE | Age: 83
End: 2023-12-15
Payer: MEDICARE

## 2023-12-15 ENCOUNTER — APPOINTMENT (OUTPATIENT)
Dept: GENERAL RADIOLOGY | Facility: HOSPITAL | Age: 83
End: 2023-12-15
Payer: MEDICARE

## 2023-12-15 ENCOUNTER — HOSPITAL ENCOUNTER (EMERGENCY)
Facility: HOSPITAL | Age: 83
Discharge: HOME | End: 2023-12-16
Attending: STUDENT IN AN ORGANIZED HEALTH CARE EDUCATION/TRAINING PROGRAM
Payer: MEDICARE

## 2023-12-15 DIAGNOSIS — Z86.79 HISTORY OF PULMONARY HYPERTENSION: ICD-10-CM

## 2023-12-15 DIAGNOSIS — R06.02 SHORTNESS OF BREATH: Primary | ICD-10-CM

## 2023-12-15 DIAGNOSIS — Z86.39 HISTORY OF DIABETES MELLITUS: ICD-10-CM

## 2023-12-15 DIAGNOSIS — Z86.39 HISTORY OF HYPERLIPIDEMIA: ICD-10-CM

## 2023-12-15 DIAGNOSIS — Z87.448 HISTORY OF CHRONIC KIDNEY DISEASE: ICD-10-CM

## 2023-12-15 DIAGNOSIS — G47.33 OBSTRUCTIVE SLEEP APNEA: ICD-10-CM

## 2023-12-15 DIAGNOSIS — Z86.79 HISTORY OF HYPERTENSION: ICD-10-CM

## 2023-12-15 LAB
ALBUMIN SERPL-MCNC: 4.2 G/DL (ref 3.5–5.2)
ALBUMIN/GLOB SERPL: 1.4 G/DL
ALP SERPL-CCNC: 59 U/L (ref 39–117)
ALT SERPL W P-5'-P-CCNC: 23 U/L (ref 1–41)
ANION GAP SERPL CALCULATED.3IONS-SCNC: 17 MMOL/L (ref 5–15)
AST SERPL-CCNC: 21 U/L (ref 1–40)
BASOPHILS # BLD AUTO: 0.03 10*3/MM3 (ref 0–0.2)
BASOPHILS NFR BLD AUTO: 0.3 % (ref 0–1.5)
BILIRUB SERPL-MCNC: 1 MG/DL (ref 0–1.2)
BUN SERPL-MCNC: 42 MG/DL (ref 8–23)
BUN/CREAT SERPL: 20.7 (ref 7–25)
CALCIUM SPEC-SCNC: 10.1 MG/DL (ref 8.6–10.5)
CHLORIDE SERPL-SCNC: 99 MMOL/L (ref 98–107)
CO2 SERPL-SCNC: 23 MMOL/L (ref 22–29)
CREAT SERPL-MCNC: 2.03 MG/DL (ref 0.76–1.27)
DEPRECATED RDW RBC AUTO: 44.6 FL (ref 37–54)
EGFRCR SERPLBLD CKD-EPI 2021: 32.1 ML/MIN/1.73
EOSINOPHIL # BLD AUTO: 0.47 10*3/MM3 (ref 0–0.4)
EOSINOPHIL NFR BLD AUTO: 3.9 % (ref 0.3–6.2)
ERYTHROCYTE [DISTWIDTH] IN BLOOD BY AUTOMATED COUNT: 13 % (ref 12.3–15.4)
FLUAV RNA RESP QL NAA+PROBE: NOT DETECTED
FLUBV RNA RESP QL NAA+PROBE: NOT DETECTED
GEN 5 2HR TROPONIN T REFLEX: 52 NG/L
GLOBULIN UR ELPH-MCNC: 3 GM/DL
GLUCOSE BLDC GLUCOMTR-MCNC: 118 MG/DL (ref 70–130)
GLUCOSE SERPL-MCNC: 117 MG/DL (ref 65–99)
HCT VFR BLD AUTO: 51.4 % (ref 37.5–51)
HGB BLD-MCNC: 17.5 G/DL (ref 13–17.7)
HOLD SPECIMEN: NORMAL
IMM GRANULOCYTES # BLD AUTO: 0.1 10*3/MM3 (ref 0–0.05)
IMM GRANULOCYTES NFR BLD AUTO: 0.8 % (ref 0–0.5)
LYMPHOCYTES # BLD AUTO: 2.66 10*3/MM3 (ref 0.7–3.1)
LYMPHOCYTES NFR BLD AUTO: 22.3 % (ref 19.6–45.3)
MCH RBC QN AUTO: 31.9 PG (ref 26.6–33)
MCHC RBC AUTO-ENTMCNC: 34 G/DL (ref 31.5–35.7)
MCV RBC AUTO: 93.8 FL (ref 79–97)
MONOCYTES # BLD AUTO: 0.68 10*3/MM3 (ref 0.1–0.9)
MONOCYTES NFR BLD AUTO: 5.7 % (ref 5–12)
NEUTROPHILS NFR BLD AUTO: 67 % (ref 42.7–76)
NEUTROPHILS NFR BLD AUTO: 7.97 10*3/MM3 (ref 1.7–7)
NRBC BLD AUTO-RTO: 0 /100 WBC (ref 0–0.2)
NT-PROBNP SERPL-MCNC: 304.8 PG/ML (ref 0–1800)
PLATELET # BLD AUTO: 261 10*3/MM3 (ref 140–450)
PMV BLD AUTO: 10 FL (ref 6–12)
POTASSIUM SERPL-SCNC: 3.4 MMOL/L (ref 3.5–5.2)
PROCALCITONIN SERPL-MCNC: 0.11 NG/ML (ref 0–0.25)
PROT SERPL-MCNC: 7.2 G/DL (ref 6–8.5)
RBC # BLD AUTO: 5.48 10*6/MM3 (ref 4.14–5.8)
RSV RNA NPH QL NAA+NON-PROBE: NOT DETECTED
SARS-COV-2 RNA RESP QL NAA+PROBE: NOT DETECTED
SODIUM SERPL-SCNC: 139 MMOL/L (ref 136–145)
TROPONIN T DELTA: -4 NG/L
TROPONIN T SERPL HS-MCNC: 56 NG/L
WBC NRBC COR # BLD AUTO: 11.91 10*3/MM3 (ref 3.4–10.8)
WHOLE BLOOD HOLD COAG: NORMAL
WHOLE BLOOD HOLD SPECIMEN: NORMAL

## 2023-12-15 PROCEDURE — 80053 COMPREHEN METABOLIC PANEL: CPT | Performed by: STUDENT IN AN ORGANIZED HEALTH CARE EDUCATION/TRAINING PROGRAM

## 2023-12-15 PROCEDURE — 87637 SARSCOV2&INF A&B&RSV AMP PRB: CPT | Performed by: PHYSICIAN ASSISTANT

## 2023-12-15 PROCEDURE — 85025 COMPLETE CBC W/AUTO DIFF WBC: CPT | Performed by: STUDENT IN AN ORGANIZED HEALTH CARE EDUCATION/TRAINING PROGRAM

## 2023-12-15 PROCEDURE — 36415 COLL VENOUS BLD VENIPUNCTURE: CPT

## 2023-12-15 PROCEDURE — 83880 ASSAY OF NATRIURETIC PEPTIDE: CPT | Performed by: STUDENT IN AN ORGANIZED HEALTH CARE EDUCATION/TRAINING PROGRAM

## 2023-12-15 PROCEDURE — 82948 REAGENT STRIP/BLOOD GLUCOSE: CPT

## 2023-12-15 PROCEDURE — 71045 X-RAY EXAM CHEST 1 VIEW: CPT

## 2023-12-15 PROCEDURE — 84145 PROCALCITONIN (PCT): CPT | Performed by: PHYSICIAN ASSISTANT

## 2023-12-15 PROCEDURE — 99284 EMERGENCY DEPT VISIT MOD MDM: CPT

## 2023-12-15 PROCEDURE — 93005 ELECTROCARDIOGRAM TRACING: CPT | Performed by: STUDENT IN AN ORGANIZED HEALTH CARE EDUCATION/TRAINING PROGRAM

## 2023-12-15 PROCEDURE — 84484 ASSAY OF TROPONIN QUANT: CPT | Performed by: STUDENT IN AN ORGANIZED HEALTH CARE EDUCATION/TRAINING PROGRAM

## 2023-12-15 RX ORDER — SODIUM CHLORIDE 0.9 % (FLUSH) 0.9 %
10 SYRINGE (ML) INJECTION AS NEEDED
Status: DISCONTINUED | OUTPATIENT
Start: 2023-12-15 | End: 2023-12-16 | Stop reason: HOSPADM

## 2023-12-16 ENCOUNTER — TRANSCRIBE ORDERS (OUTPATIENT)
Dept: HOME HEALTH SERVICES | Facility: HOME HEALTHCARE | Age: 83
End: 2023-12-16
Payer: MEDICARE

## 2023-12-16 ENCOUNTER — HOME HEALTH ADMISSION (OUTPATIENT)
Dept: HOME HEALTH SERVICES | Facility: HOME HEALTHCARE | Age: 83
End: 2023-12-16
Payer: MEDICARE

## 2023-12-16 VITALS
BODY MASS INDEX: 40.59 KG/M2 | HEART RATE: 73 BPM | DIASTOLIC BLOOD PRESSURE: 85 MMHG | RESPIRATION RATE: 28 BRPM | WEIGHT: 289.9 LBS | OXYGEN SATURATION: 96 % | TEMPERATURE: 98.1 F | SYSTOLIC BLOOD PRESSURE: 160 MMHG | HEIGHT: 71 IN

## 2023-12-16 DIAGNOSIS — E13.9 DIABETES MELLITUS OF OTHER TYPE WITHOUT COMPLICATION, UNSPECIFIED WHETHER LONG TERM INSULIN USE: ICD-10-CM

## 2023-12-16 DIAGNOSIS — I50.9 HEART FAILURE, UNSPECIFIED HF CHRONICITY, UNSPECIFIED HEART FAILURE TYPE: Primary | ICD-10-CM

## 2023-12-16 DIAGNOSIS — J90 PLEURAL EFFUSION: ICD-10-CM

## 2023-12-16 DIAGNOSIS — E11.69 TYPE 2 DIABETES MELLITUS WITH OTHER SPECIFIED COMPLICATION, UNSPECIFIED WHETHER LONG TERM INSULIN USE: ICD-10-CM

## 2023-12-16 DIAGNOSIS — M25.462 SWELLING OF LEFT KNEE JOINT: ICD-10-CM

## 2023-12-16 LAB
QT INTERVAL: 400 MS
QTC INTERVAL: 481 MS

## 2023-12-16 NOTE — ED PROVIDER NOTES
EMERGENCY DEPARTMENT ENCOUNTER    Pt Name: Javier Lerner  MRN: [id number]  Pt :   1940  Room Number:    Date of encounter:  12/15/2023  PCP: Javier Kirkpatrick MD  ED Provider: EVELIO Ventura    Historian: Patient    HPI:  Chief Complaint: Shortness of breath    Context: Javier Lerner is a 83 y.o. male who presents to the ED from [facility] where staff at the facility felt patient seemed to be more confused and was having increased shortness of breath.  Per EMS report patient recently was at [facility] for rehab following a pneumothorax.  Patient without any complaints on exam.  He reports that he feels as if he is breathing normally.  He states that he was due to be discharged from [facility] in the morning.  He answers all questions appropriately.  He wants to be able to either return home or back to [facility] so that he can be discharged.  He denies any fever.  He reports no nausea or vomiting.  He states that he did have cough this morning but that is not been persistent throughout the day today.  He denies diarrhea, constipation or any urinary complaints.  HPI     REVIEW OF SYSTEMS  A chief complaint appropriate review of systems was completed and is negative except as noted in the HPI.     PAST MEDICAL HISTORY  Past Medical History:   Diagnosis Date    Arthritis     Bradycardia     Cataract     Cholelithiasis     Chronic fatigue 2016    CKD (chronic kidney disease) stage 3, GFR 30-59 ml/min     Colon polyp     Diabetes mellitus     DX. , FSBS 1 X DAY    Enlarged prostate     GERD (gastroesophageal reflux disease)     Heartburn     HL (hearing loss)     Hyperlipidemia     Hypertension     Liver disease     Myocardial infarction     Obesity, Class II, BMI 35-39.9, with comorbidity     OMAIRA (obstructive sleep apnea)     Pacemaker     Pancreatitis     HISTORY OF    Pulmonary HTN 10/16/2023    Wears glasses        PAST SURGICAL  HISTORY  Past Surgical History:   Procedure Laterality Date    ANKLE SURGERY Right     APPENDECTOMY      CARDIAC ELECTROPHYSIOLOGY PROCEDURE N/A 2017    Procedure: Pacemaker DC new;  Surgeon: Bryson Leon MD;  Location: [facility] INVASIVE LOCATION;  Service:     CATARACT EXTRACTION, BILATERAL      CHOLECYSTECTOMY      COLONOSCOPY      HERNIA REPAIR      UMBILICAL     PACEMAKER IMPLANTATION  2017    PER DR. LEON       FAMILY HISTORY  Family History   Problem Relation Age of Onset    Heart disease Mother     Diabetes Father     Hypertension Father     Heart disease Father     Sleep apnea Father        SOCIAL HISTORY  Social History     Socioeconomic History    Marital status:    Tobacco Use    Smoking status: Former     Packs/day: 0.50     Years: 60.00     Additional pack years: 0.00     Total pack years: 30.00     Types: Cigarettes, Pipe     Quit date: 2014     Years since quittin.9     Passive exposure: Never    Smokeless tobacco: Never   Vaping Use    Vaping Use: Never used   Substance and Sexual Activity    Alcohol use: Yes     Comment: 3-4 drinks per month occassional    Drug use: Never    Sexual activity: Defer       ALLERGIES  Penicillins and Tetracyclines & related    PHYSICAL EXAM  Physical Exam  Vitals and nursing note reviewed.   Constitutional:       General: He is not in acute distress.     Appearance: Normal appearance. He is obese. He is not ill-appearing, toxic-appearing or diaphoretic.   HENT:      Head: Normocephalic and atraumatic.      Nose: Nose normal.      Mouth/Throat:      Mouth: Mucous membranes are moist.   Eyes:      Extraocular Movements: Extraocular movements intact.   Cardiovascular:      Rate and Rhythm: Normal rate.   Pulmonary:      Effort: Pulmonary effort is normal. Tachypnea present.      Breath sounds: Normal breath sounds.   Abdominal:      General: There is no distension.   Musculoskeletal:         General: Normal range of motion.       Cervical back: Normal range of motion and neck supple.   Skin:     General: Skin is warm and dry.   Neurological:      General: No focal deficit present.      Mental Status: He is alert.   Psychiatric:         Mood and Affect: Mood is anxious.       LAB RESULTS  Results for orders placed or performed during the hospital encounter of 12/15/23   COVID-19, FLU A/B, RSV PCR 1 HR TAT - Swab, Nasopharynx    Specimen: Nasopharynx; Swab   Result Value Ref Range    COVID19 Not Detected Not Detected - Ref. Range    Influenza A PCR Not Detected Not Detected    Influenza B PCR Not Detected Not Detected    RSV, PCR Not Detected Not Detected   Comprehensive Metabolic Panel    Specimen: Blood   Result Value Ref Range    Glucose 117 (H) 65 - 99 mg/dL    BUN 42 (H) 8 - 23 mg/dL    Creatinine 2.03 (H) 0.76 - 1.27 mg/dL    Sodium 139 136 - 145 mmol/L    Potassium 3.4 (L) 3.5 - 5.2 mmol/L    Chloride 99 98 - 107 mmol/L    CO2 23.0 22.0 - 29.0 mmol/L    Calcium 10.1 8.6 - 10.5 mg/dL    Total Protein 7.2 6.0 - 8.5 g/dL    Albumin 4.2 3.5 - 5.2 g/dL    ALT (SGPT) 23 1 - 41 U/L    AST (SGOT) 21 1 - 40 U/L    Alkaline Phosphatase 59 39 - 117 U/L    Total Bilirubin 1.0 0.0 - 1.2 mg/dL    Globulin 3.0 gm/dL    A/G Ratio 1.4 g/dL    BUN/Creatinine Ratio 20.7 7.0 - 25.0    Anion Gap 17.0 (H) 5.0 - 15.0 mmol/L    eGFR 32.1 (L) >60.0 mL/min/1.73   BNP    Specimen: Blood   Result Value Ref Range    proBNP 304.8 0.0 - 1,800.0 pg/mL   Single High Sensitivity Troponin T    Specimen: Blood   Result Value Ref Range    HS Troponin T 56 (C) <22 ng/L   CBC Auto Differential    Specimen: Blood   Result Value Ref Range    WBC 11.91 (H) 3.40 - 10.80 10*3/mm3    RBC 5.48 4.14 - 5.80 10*6/mm3    Hemoglobin 17.5 13.0 - 17.7 g/dL    Hematocrit 51.4 (H) 37.5 - 51.0 %    MCV 93.8 79.0 - 97.0 fL    MCH 31.9 26.6 - 33.0 pg    MCHC 34.0 31.5 - 35.7 g/dL    RDW 13.0 12.3 - 15.4 %    RDW-SD 44.6 37.0 - 54.0 fl    MPV 10.0 6.0 - 12.0 fL    Platelets 261 140 - 450  10*3/mm3    Neutrophil % 67.0 42.7 - 76.0 %    Lymphocyte % 22.3 19.6 - 45.3 %    Monocyte % 5.7 5.0 - 12.0 %    Eosinophil % 3.9 0.3 - 6.2 %    Basophil % 0.3 0.0 - 1.5 %    Immature Grans % 0.8 (H) 0.0 - 0.5 %    Neutrophils, Absolute 7.97 (H) 1.70 - 7.00 10*3/mm3    Lymphocytes, Absolute 2.66 0.70 - 3.10 10*3/mm3    Monocytes, Absolute 0.68 0.10 - 0.90 10*3/mm3    Eosinophils, Absolute 0.47 (H) 0.00 - 0.40 10*3/mm3    Basophils, Absolute 0.03 0.00 - 0.20 10*3/mm3    Immature Grans, Absolute 0.10 (H) 0.00 - 0.05 10*3/mm3    nRBC 0.0 0.0 - 0.2 /100 WBC   Procalcitonin    Specimen: Blood   Result Value Ref Range    Procalcitonin 0.11 0.00 - 0.25 ng/mL   High Sensitivity Troponin T 2Hr    Specimen: Blood   Result Value Ref Range    HS Troponin T 52 (H) <22 ng/L    Troponin T Delta -4 (L) >=-4 - <+4 ng/L   POC Glucose Once    Specimen: Blood   Result Value Ref Range    Glucose 118 70 - 130 mg/dL   ECG 12 Lead ED Triage Standing Order; SOA   Result Value Ref Range    QT Interval 400 ms    QTC Interval 481 ms   Green Top (Gel)   Result Value Ref Range    Extra Tube Hold for add-ons.    Lavender Top   Result Value Ref Range    Extra Tube hold for add-on    Gold Top - SST   Result Value Ref Range    Extra Tube Hold for add-ons.    Gray Top   Result Value Ref Range    Extra Tube Hold for add-ons.    Light Blue Top   Result Value Ref Range    Extra Tube Hold for add-ons.        If labs were ordered, I independently reviewed the results and considered them in treating the patient.    RADIOLOGY  XR Chest 1 View   Final Result   Impression:   Moderate, likely cardiogenic pulmonary edema with small bilateral pleural effusions.         Electronically Signed: Dionisio Wang MD     12/15/2023 7:53 PM EST     Workstation ID: [id number]        [x] Radiologist's Report Reviewed:  I ordered and independently interpreted the above noted radiographic studies.  See radiologist's dictation for official interpretation.       PROCEDURES    Procedures    ECG 12 Lead ED Triage Standing Order; SOA   Preliminary Result   Test Reason : ED Triage Standing Order~   Blood Pressure :   */*   mmHG   Vent. Rate :  87 BPM     Atrial Rate :  87 BPM      P-R Int : 256 ms          QRS Dur : 122 ms       QT Int : 400 ms       P-R-T Axes :   *  27 229 degrees      QTc Int : 481 ms      Atrial-paced rhythm with prolonged AV conduction   Anteroseptal infarct , age undetermined   Marked ST abnormality, possible lateral subendocardial injury   Abnormal ECG   When compared with ECG of 06-JAN-2017 07:09,   Electronic atrial pacemaker has replaced Electronic ventricular pacemaker      Referred By: NARCISO           Confirmed By:           MEDICATIONS GIVEN IN ER    Medications   sodium chloride 0.9 % flush 10 mL (has no administration in time range)       MEDICAL DECISION MAKING, PROGRESS, and CONSULTS   Medical Decision Making  Problems Addressed:  History of chronic kidney disease: chronic illness or injury  History of diabetes mellitus: chronic illness or injury  History of hyperlipidemia: chronic illness or injury  History of hypertension: chronic illness or injury  History of pulmonary hypertension: chronic illness or injury  Obstructive sleep apnea: chronic illness or injury  Shortness of breath: complicated acute illness or injury    Amount and/or Complexity of Data Reviewed  Labs: ordered.  Radiology: ordered.  ECG/medicine tests: ordered.    Risk  Prescription drug management.        All labs have been independently reviewed by me.  All radiology studies have been interpreted by me and the radiologist dictating the report.  All EKG's have been independently interpreted by me as well as and overseeing attending physician.    [] Discussed with radiology regarding test interpretation:    Discussion below represents my analysis of pertinent findings related to patient's condition, differential diagnosis, treatment plan and final  disposition.    Differential diagnosis:  The differential diagnosis associated with the patient's presentation includes: ACS, CHF, pneumothorax , COPD exacerbation, allergic etiologies, infectious etiologies such as PNA.    Additional sources  Discussed/ obtained information from independent historians:   [] Spouse  [] Parent  [x] Family member  [] Friend  [] EMS   [] Other:  External (non-ED) record review:   [] Inpatient record:   [x] Office record: Patient seen and evaluated in the clinic on November 2, 2023 where he was evaluated by cardiologist Dr. King with diagnosis of acute on chronic diastolic congestive heart failure, hypertension, hyperlipidemia and type 2 diabetes   [] Outpatient record:   [] Prior Outpatient labs:   [] Prior Outpatient radiology:   [] Primary Care record:   [] Outside ED record:   [] Other:   Patient's care impacted by:   [x] Diabetes  [x] Hypertension  [x] Hyperlipidemia  [] Hypothyroidism   [] Coronary Artery Disease   [] COPD   [] Cancer   [] Obesity  [] GERD   [] Tobacco Abuse   [] Substance Abuse    [] Anxiety   [] Depression   [x] Other: CHF, stage III chronic kidney disease  Care significantly affected by Social Determinants of Health (housing and economic circumstances, unemployment)    [] Yes     [x] No   If yes, Patient's care significantly limited by  Social Determinants of Health including:   [] Inadequate housing   [] Low income   [] Alcoholism and drug addiction in family   [] Problems related to primary support group   [] Unemployment   [] Problems related to employment   [] Other Social Determinants of Health:     Shared decision making:  I had a discussion with the patient/family regarding diagnosis, diagnostic results, treatment plan, and medications.  The patient/family indicated understanding of these instructions.  I spent adequate time at the bedside preceding discharge necessary to personally discuss the aftercare instructions, giving patient education,  providing explanations of the results of our evaluations/findings, and my decision making to assure that the patient/family understand the plan of care.  Time was allotted to answer questions at that time and throughout the ED course.  Emphasis was placed on timely follow-up after discharge.  I also discussed the potential for the development of an acute emergent condition requiring further evaluation, admission, or even surgical intervention. I discussed that we found nothing during the visit today indicating the need for further workup, admission, or the presence of an unstable medical condition.  I encouraged the patient to return to the emergency department immediately for ANY concerns, worsening, new complaints, or if symptoms persist and unable to seek follow-up in a timely fashion.  The patient/family expressed understanding and agreement with this plan.  The patient will follow-up with back at [facility] for reevaluation.      Orders placed during this visit:  Orders Placed This Encounter   Procedures    COVID PRE-OP / PRE-PROCEDURE SCREENING ORDER (NO ISOLATION) - Swab, Nasopharynx    COVID-19, FLU A/B, RSV PCR 1 HR TAT - Swab, Nasopharynx    XR Chest 1 View    [place] Draw    Comprehensive Metabolic Panel    BNP    Single High Sensitivity Troponin T    CBC Auto Differential    Procalcitonin    High Sensitivity Troponin T 2Hr    NPO Diet NPO Type: Strict NPO    Undress & Gown    Continuous Pulse Oximetry    Vital Signs    Oxygen Therapy- Nasal Cannula; Titrate 1-6 LPM Per SpO2; 90 - 95%    POC Glucose Once    ECG 12 Lead ED Triage Standing Order; SOA    Insert Peripheral IV    CBC & Differential    Green Top (Gel)    Lavender Top    Gold Top - SST    Gray Top    Light Blue Top       I considered prescription management  with:   [] Pain medication  [] Antiviral  [x] Antibiotic: Clinical presentation and ER workup without evidence of bacterial infection requiring treatment with antibiotics.     []  Other:   Rationale:    ED Course:    ED Course as of 12/16/23 0039   Fri Dec 15, 2023   2243 On reassessment patient is resting in a position of comfort in no acute distress.  I reviewed the plan of care of disposition back to [facility] with patient and family member at bedside who are agreeable to plan. [JG]   Sat Dec 16, 2023   0030 In summary this is an 83-year-old nontoxic-appearing male who presents to the ER from [facility] where staff believed patient to have symptoms of shortness of breath and confusion.  Patient alert, oriented and in no acute distress upon arrival to the ED.  He is anxious because he is unsure why he has been sent to the ED. Per family at bedside, there is suspicion that patient may have fallen asleep without his CPAP.  Patient has history of severe sleep apnea.  Labs in the ER without acute or emergent abnormalities.  Nasopharyngeal swab [place] for COVID, influenza and RSV.  EKG without evidence of acute ischemic changes.  Chest x-ray demonstrates moderate pulmonary edema with small bilateral pleural effusions.  BNP within normal limits.  Procalcitonin normal.  Patient without chest pain with elevated high-sensitivity troponin likely secondary to chronic kidney disease. Based on clinical presentation and workup in ED including labs and imaging, no clear indication for treatment beyond symptomatic management.  Patient without any signs of respiratory failure or hypoxia while in the ED and consistently maintain oxygen saturation greater than 95% on room air. At time of discharge disposition patient is afebrile, nontoxic appearing, vital signs stable and able to maintain O2 sats of 97% on room air.  Patient discharged with instructions to return to [facility] to complete rehab. [JG]      ED Course User Index  [JG] Amador Gatica, PA            DIAGNOSIS  Final diagnoses:   Shortness of breath   History of chronic kidney disease   History of diabetes mellitus    History of hypertension   History of hyperlipidemia   Obstructive sleep apnea   History of pulmonary hypertension       DISPOSITION    ED Disposition       ED Disposition   Discharge    Condition   Stable    Comment   --               Please note that portions of this document were completed with voice recognition software.        Amador Gatica PA  12/16/23 0039

## 2023-12-16 NOTE — DISCHARGE INSTRUCTIONS
Symptomatic care is recommended. Take all medications as prescribed and instructed. Return to [facility] this evening, follow up with primary care as directed or return to Emergency Department with worsening of symptoms.

## 2023-12-18 ENCOUNTER — HOME CARE VISIT (OUTPATIENT)
Dept: HOME HEALTH SERVICES | Facility: HOME HEALTHCARE | Age: 83
End: 2023-12-18
Payer: MEDICARE

## 2023-12-18 VITALS
SYSTOLIC BLOOD PRESSURE: 162 MMHG | HEART RATE: 74 BPM | TEMPERATURE: 97 F | OXYGEN SATURATION: 97 % | RESPIRATION RATE: 16 BRPM | DIASTOLIC BLOOD PRESSURE: 78 MMHG

## 2023-12-18 PROCEDURE — G0299 HHS/HOSPICE OF RN EA 15 MIN: HCPCS

## 2023-12-19 ENCOUNTER — OFFICE VISIT (OUTPATIENT)
Dept: FAMILY MEDICINE CLINIC | Facility: CLINIC | Age: 83
End: 2023-12-19
Payer: MEDICARE

## 2023-12-19 ENCOUNTER — HOME CARE VISIT (OUTPATIENT)
Dept: HOME HEALTH SERVICES | Facility: HOME HEALTHCARE | Age: 83
End: 2023-12-19
Payer: MEDICARE

## 2023-12-19 VITALS
SYSTOLIC BLOOD PRESSURE: 139 MMHG | DIASTOLIC BLOOD PRESSURE: 84 MMHG | HEART RATE: 80 BPM | OXYGEN SATURATION: 96 % | TEMPERATURE: 98.6 F | RESPIRATION RATE: 16 BRPM

## 2023-12-19 VITALS
BODY MASS INDEX: 40.96 KG/M2 | HEART RATE: 80 BPM | HEIGHT: 71 IN | DIASTOLIC BLOOD PRESSURE: 88 MMHG | TEMPERATURE: 95.9 F | RESPIRATION RATE: 20 BRPM | SYSTOLIC BLOOD PRESSURE: 142 MMHG | WEIGHT: 292.6 LBS | OXYGEN SATURATION: 96 %

## 2023-12-19 DIAGNOSIS — I10 ESSENTIAL HYPERTENSION: Primary | ICD-10-CM

## 2023-12-19 DIAGNOSIS — N18.31 STAGE 3A CHRONIC KIDNEY DISEASE: ICD-10-CM

## 2023-12-19 DIAGNOSIS — E66.01 CLASS 2 SEVERE OBESITY DUE TO EXCESS CALORIES WITH SERIOUS COMORBIDITY AND BODY MASS INDEX (BMI) OF 37.0 TO 37.9 IN ADULT: ICD-10-CM

## 2023-12-19 DIAGNOSIS — I48.0 PAROXYSMAL ATRIAL FIBRILLATION: ICD-10-CM

## 2023-12-19 DIAGNOSIS — Z79.4 UNCONTROLLED DIABETES MELLITUS WITH HYPERGLYCEMIA, WITH LONG-TERM CURRENT USE OF INSULIN: ICD-10-CM

## 2023-12-19 DIAGNOSIS — E11.65 UNCONTROLLED DIABETES MELLITUS WITH HYPERGLYCEMIA, WITH LONG-TERM CURRENT USE OF INSULIN: ICD-10-CM

## 2023-12-19 PROCEDURE — 99214 OFFICE O/P EST MOD 30 MIN: CPT | Performed by: FAMILY MEDICINE

## 2023-12-19 PROCEDURE — 3079F DIAST BP 80-89 MM HG: CPT | Performed by: FAMILY MEDICINE

## 2023-12-19 PROCEDURE — 1160F RVW MEDS BY RX/DR IN RCRD: CPT | Performed by: FAMILY MEDICINE

## 2023-12-19 PROCEDURE — 1159F MED LIST DOCD IN RCRD: CPT | Performed by: FAMILY MEDICINE

## 2023-12-19 PROCEDURE — 3077F SYST BP >= 140 MM HG: CPT | Performed by: FAMILY MEDICINE

## 2023-12-19 PROCEDURE — G0152 HHCP-SERV OF OT,EA 15 MIN: HCPCS

## 2023-12-19 NOTE — HOME HEALTH
"SOC Note: DIscharged from [facility] on 12. 16. 23    Home Health ordered for: disciplines SN, PT, OT     Reason for Hosp/Primary Dx/Co-morbidities: Heart failure , Pleural Effusion  Hx of Diabetes, BPH, DJD of bilateral knees with recent Effusion of L knee.     Focus of Care: Heart failure education including need for daily weight monitoing, low salt diet, medication education.      Patient's goal(s):\" to get stronger and stay out of the hospital.     Current Functional status/mobility/DME: Uses walker and lift chair.  SOA with minimal activity.  Knee pain with ambulation    HB status/Living Arrangements: Lives in old 2 story farm house with wife.  THere is a bedroom and bathroom on first floor.  There is also a stair lift but pt is too heavy at this time to use it.     Skin Integrity/wound status: Monty score of 20 with no open areas.     Code Status: Full code    Fall Risk/Safety concerns: Mahc score of 8    Educated on Emergency Plan, steps to take prior to going to the ER and when to Call Home Health First:  wife given magnet with homecare number and encouraged to call with questions or concerns    Medication issues/Concerns:Meds reviewed with pt and wife.  She sets up mediplanner.  There was confusion by the wife and she was only giving meds that had been called into local pharmacy.  THey were not giving all his current meds including bp meds and bumex.  Reviewed meds and helped her understand which meds he needs.  There is a question about jardiance as pt has 10mg at home and [facility] discharge papers state 25mg daily.  RN attempted to call [facility] with no return phone calls and had to leave a message.  Pt is seeing PCP tomorrow and wife taking all meds to that appt along with [facility] discharge papers.     Additional Problems/Concerns: Pt would benefit from current health but it was not discussed today as he was a bit overwhelmed with the informatoin given.  Assessement also indicates that pt has been rather " noncompliant with diet and daily weights.  Wife very exasperated with him.  Pt has agreed to daily wts and some low salt diet education started which were received well. Current health could be introduced in another visit or 2.    SDOH Barriers (i.e. caregiver concerns, social isolation, transportation, food insecurity, environment, income etc.)/Need for MSW: n/a    Plan for next visit: Followup on any med changes,  continue Heart failure education miller diet and daily wts.  Introduce idea of Current health.

## 2023-12-19 NOTE — HOME HEALTH
84 y/o M with receont hospitalizaion/rehab stay. Pt. presents to the ED from [facility] where staff at the facility felt patient seemed to be more confused and was having increased shortness of breath. Per EMS report patient recently was at [facility] for rehab following a pneumothorax. Pt. seen today for OT evaluation and has no further skilled OT needs at this time secondary to pt. had returned to his baseline level of independence with all ADLs at home. Patient also independent with his car transfer (OT observed patient transfer from [facility] to [place]). Pt./family demonstrated understanding of basic home safety and has all necessary DME needs at this time.     PMH listed below:  Arthritis   Bradycardia   Cataract   Cholelithiasis   Chronic fatigue   9/22/2016   CKD (chronic kidney disease) stage 3, GFR 30-59 ml/min   Colon polyp   Diabetes mellitus   DX. 1997, FSBS 1 X DAY   Enlarged prostate   GERD (gastroesophageal reflux disease)   Heartburn   HL (hearing loss)   Hyperlipidemia   Hypertension   Liver disease   Myocardial infarction   Obesity, Class II, BMI 35-39.9, with comorbidity   OMAIRA (obstructive sleep apnea)   Pacemaker    Pancreatitis  Pulmonary HTN

## 2023-12-19 NOTE — PROGRESS NOTES
Subjective   Javier Lerner is a 83 y.o. male      Chief Complaint  Hospital follow-up.  Respiratory failure.  Congestive heart failure.  Pleural effusion.       History of Present Illness  The patient was hospitalized at [facility] from 12/05/2023 to 12/16/2023. He is accompanied by an adult female.    The patient is doing well. He ordered 3 more bottles of Prevagen, which he believes is working well for him.     He notes that he was taking chewable CBD prior to his hospitalization and he believes this improved his tolerance to insulin. When the patient was admitted to [facility], his insulin was changed to Levemir 40 to 45 units in the morning and NovoLog 15 units with each meal. The adult female states that he appears to be doing well with this regimen. He was previously taking Humulin R U-500 3 to 5 times daily and Levemir 45 units 3 times daily with meals. He would like to continue his current insulin regimen. He did not tolerate Humulin R U-500 well. He was prescribed Jardiance 10 mg approximately 2 to 3 months ago by Dr. King and his dose was increased to 25 mg at [facility]. He was advised by a nurse through [facility] to take Jardiance 10 mg 2 tablets. His average blood glucose for the last 7 days has been 167 mg/dL. He has been seeing Dr. Steve Monet MD, of endocrinology for approximately 6 months.     He does not enjoy limiting sodium in his diet. The patient was informed that he had accumulated 11 L of fluid, including 1 L from his lung. He had a catheter in situ for 8 days. He experienced a pneumothorax. He had severe dyspnea. His pleural fluid was negative for infection and malignancy.      The following portions of the patient's history were reviewed and updated as appropriate: allergies, current medications, past social history and problem list.    Review of Systems   Constitutional:  Positive for fatigue. Negative for appetite change, diaphoresis, fever and unexpected weight change.    HENT:  Negative for congestion and trouble swallowing.    Eyes:  Negative for visual disturbance.   Respiratory:  Positive for chest tightness. Negative for cough, choking and shortness of breath.    Cardiovascular:  Positive for chest pain. Negative for palpitations and leg swelling.   Gastrointestinal:  Negative for abdominal distention, abdominal pain, diarrhea, nausea and vomiting.   Endocrine: Negative for polydipsia, polyphagia and polyuria.   Musculoskeletal:  Negative for back pain.   Skin:  Negative for color change.   Neurological:  Negative for dizziness, syncope, weakness, light-headedness and numbness.   Psychiatric/Behavioral:  Negative for dysphoric mood. The patient is not nervous/anxious.          Objective         Vitals:    12/19/23 1506   BP: 142/88   Pulse: 80   Resp: 20   Temp: 95.9 °F (35.5 °C)   SpO2: 96%         Physical Exam  Vitals and nursing note reviewed.   Constitutional:       General: He is not in acute distress.     Appearance: Normal appearance. He is well-developed. He is not ill-appearing, toxic-appearing or diaphoretic.   Neck:      Thyroid: No thyromegaly.      Vascular: No JVD.   Cardiovascular:      Rate and Rhythm: Normal rate and regular rhythm.      Pulses: Normal pulses.      Heart sounds: Normal heart sounds. No murmur heard.  Pulmonary:      Effort: Pulmonary effort is normal. No respiratory distress.      Breath sounds: Normal breath sounds.   Abdominal:      Palpations: Abdomen is soft. There is no mass.      Tenderness: There is no abdominal tenderness.   Musculoskeletal:         General: No swelling.      Cervical back: Neck supple.   Lymphadenopathy:      Cervical: No cervical adenopathy.   Skin:     General: Skin is warm and dry.   Neurological:      Mental Status: He is alert and oriented to person, place, and time.      Sensory: No sensory deficit.   Psychiatric:         Mood and Affect: Mood normal.         Behavior: Behavior normal.              No results  were obtained or interpreted today.      Assessment & Plan     Problems Addressed this Visit          Cardiac and Vasculature    Paroxysmal atrial fibrillation       Endocrine and Metabolic    Uncontrolled diabetes mellitus with hyperglycemia, with long-term current use of insulin       Genitourinary and Reproductive     CKD (chronic kidney disease) stage 3, GFR 30-59 ml/min     Other Visit Diagnoses       Essential hypertension    -  Primary    Class 2 severe obesity due to excess calories with serious comorbidity and body mass index (BMI) of 37.0 to 37.9 in adult              Diagnoses         Codes Comments    Essential hypertension    -  Primary ICD-10-CM: I10  ICD-9-CM: 401.9     Uncontrolled diabetes mellitus with hyperglycemia, with long-term current use of insulin     ICD-10-CM: E11.65, Z79.4  ICD-9-CM: 250.02, V58.67     Paroxysmal atrial fibrillation     ICD-10-CM: I48.0  ICD-9-CM: 427.31     Class 2 severe obesity due to excess calories with serious comorbidity and body mass index (BMI) of 37.0 to 37.9 in adult     ICD-10-CM: E66.01, Z68.37  ICD-9-CM: 278.01, V85.37     Stage 3a chronic kidney disease     ICD-10-CM: N18.31  ICD-9-CM: 585.3           Plan     No medication changes.  Refills as needed.    Encouraged to work on diet and weight management as he definitely needs to lose some weight.    I spent 40 minutes in patient care: Reviewing records prior to the visit, examining the patient, entering orders and documentation    Part of this note may be an electronic transcription/translation of spoken language to printed text using the Dragon Dictation System.        Transcribed from ambient dictation for YG Kirkpatrick MD by Vee Merino.  12/19/23   16:42 EST    Patient or patient representative verbalized consent to the visit recording.  I have personally performed the services described in this document as transcribed by the above individual, and it is both accurate and complete.

## 2023-12-21 ENCOUNTER — HOME CARE VISIT (OUTPATIENT)
Dept: HOME HEALTH SERVICES | Facility: HOME HEALTHCARE | Age: 83
End: 2023-12-21
Payer: MEDICARE

## 2023-12-21 PROCEDURE — G0151 HHCP-SERV OF PT,EA 15 MIN: HCPCS

## 2023-12-22 VITALS
TEMPERATURE: 97.9 F | RESPIRATION RATE: 16 BRPM | OXYGEN SATURATION: 97 % | DIASTOLIC BLOOD PRESSURE: 62 MMHG | SYSTOLIC BLOOD PRESSURE: 110 MMHG | HEART RATE: 74 BPM

## 2023-12-22 NOTE — HOME HEALTH
"Routine Visit Note:    Patient states he fell approximately one month ago in his living room.  He states he became dizzy and could not get up.  He spent 1.5 weeks at the hospital and then 1.5 weeks at [facility].  He states he was not given any HEP by [facility] upon his return home, \"I've just been laying here\". He states he is able to sleep in bed and does not need any help.  PLOF indep with performance of all functional mobility tasks.  He was driving and lives with spouse. Patient verbalizes motivation to obtain a written and verbal HEP.     Skill/education provided: patient and caregiver education focused on goals and role of home care services.     Patient/caregiver response: patient agreeable / motivated to participate with PT home care services.     Plan for next visit: HEP, gait training, bilateral LE strengthening"

## 2023-12-26 ENCOUNTER — TELEPHONE (OUTPATIENT)
Dept: CARDIOLOGY | Facility: CLINIC | Age: 83
End: 2023-12-26
Payer: MEDICARE

## 2023-12-26 NOTE — TELEPHONE ENCOUNTER
"    Caller: Javier Lerner \"Yfn\"    Relationship: Self    Best call back number: [phone]    Requested Prescriptions:   Requested Prescriptions     Pending Prescriptions Disp Refills    insulin detemir (Levemir FlexPen) 100 UNIT/ML injection       Sig: Inject 45 Units under the skin into the appropriate area as directed Daily.    insulin aspart (NovoLOG FlexPen) 100 UNIT/ML solution pen-injector sc pen       Sig: Inject 15 Units under the skin into the appropriate area as directed 3 (Three) Times a Day.        Pharmacy where request should be sent: [facility] [address] [phone] [facility] [phone]      Last office visit with prescribing clinician: 12/19/2023   Last telemedicine visit with prescribing clinician: Visit date not found   Next office visit with prescribing clinician: 1/26/2024     Additional details provided by patient: COMPLETELY OUT OF MEDICATIONS AND REQUESTING REFILLS ASAP     Does the patient have less than a 3 day supply:  [x] Yes  [] No    Would you like a call back once the refill request has been completed: [x] Yes [] No    If the office needs to give you a call back, can they leave a voicemail: [x] Yes [] No    Clay Mitchell Rep   12/26/23 10:55 EST         "

## 2023-12-26 NOTE — TELEPHONE ENCOUNTER
"  Caller: Javier Lerner \"Yfn\"    Relationship: Self    Best call back number: [phone]    What is the best time to reach you: ANY    Who are you requesting to speak with (clinical staff, provider,  specific staff member): ANY        What was the call regarding: PATIENT CALLED TO ADVISE HE RECEIVED A BIOTRONIK UNIT AT HOME THAT PLUGS INTO HIS WALL. HE IS INQUIRING IF THIS IS TO REPLACE THE ONE HE HAS AT HOME. PLEASE CONTACT PATIENT AND ADVISE ON THIS ISSUE.    Is it okay if the provider responds through [email or web address]: PLEASE CALL        "

## 2023-12-26 NOTE — TELEPHONE ENCOUNTER
Called  to let him know the monitor he received is from [employer] and it is an upgrade.

## 2023-12-28 ENCOUNTER — HOME CARE VISIT (OUTPATIENT)
Dept: HOME HEALTH SERVICES | Facility: HOME HEALTHCARE | Age: 83
End: 2023-12-28
Payer: MEDICARE

## 2023-12-28 PROCEDURE — G0300 HHS/HOSPICE OF LPN EA 15 MIN: HCPCS

## 2023-12-28 RX ORDER — INSULIN DETEMIR 100 [IU]/ML
45 INJECTION, SOLUTION SUBCUTANEOUS DAILY
Qty: 9 ML | Refills: 11 | Status: SHIPPED | OUTPATIENT
Start: 2023-12-28

## 2023-12-28 RX ORDER — INSULIN ASPART 100 [IU]/ML
15 INJECTION, SOLUTION INTRAVENOUS; SUBCUTANEOUS 3 TIMES DAILY
Qty: 9 ML | Refills: 11 | Status: SHIPPED | OUTPATIENT
Start: 2023-12-28

## 2023-12-29 ENCOUNTER — TELEPHONE (OUTPATIENT)
Dept: FAMILY MEDICINE CLINIC | Facility: CLINIC | Age: 83
End: 2023-12-29

## 2023-12-29 ENCOUNTER — HOME CARE VISIT (OUTPATIENT)
Dept: HOME HEALTH SERVICES | Facility: HOME HEALTHCARE | Age: 83
End: 2023-12-29
Payer: MEDICARE

## 2023-12-29 VITALS
RESPIRATION RATE: 18 BRPM | OXYGEN SATURATION: 99 % | DIASTOLIC BLOOD PRESSURE: 82 MMHG | HEART RATE: 77 BPM | SYSTOLIC BLOOD PRESSURE: 143 MMHG

## 2023-12-29 PROCEDURE — G0157 HHC PT ASSISTANT EA 15: HCPCS

## 2023-12-29 NOTE — HOME HEALTH
Routine Visit Note: LPN    Skill/education provided: Instructed on the disease process of congestive heart failure. Instructed on daily weight. instructed on diet related to CHF. Medication management.  continue Heart      Patient/caregiver response     Plan for next visit: Continue with POC, instructing on the disease process of congestive heart failure. Instruct on daily weight. Instruct on diet related to CHF. Medication management.       Other pertinent info:  Today's weight is 175mg/dl

## 2023-12-29 NOTE — TELEPHONE ENCOUNTER
Caller: KARLENE [facility]    Relationship: [facility]    Best call back number: [phone]  [phone]    What orders are you requesting (i.e. lab or imaging): ORDERS FOR MEDICATION FOR RASH/ EXCORIATED SKIN IN GROIN AREA AND WOUND CARE FOR PATIENT TO HELP TREAT RASH    In what timeframe would the patient need to come in: ASAP    Where will you receive your lab/imaging services: IN HOME    Additional notes: HOME HEALTH CALLING TO SEE ABOUT GETTING MEDICATION T HELP TREAT RASH IN GROIN AREA AS WELL AS SOME PAIN MEDICATION TO HELP WITH BURNING IN THE AREA. AS WELL AS WOUND CARE TO HELP TREAT RASH

## 2023-12-29 NOTE — TELEPHONE ENCOUNTER
Caller: [facility]    Relationship: Home Health    Best call back number: [phone] FABIO TALBOT    What is the best time to reach you: ANYTIME    Who are you requesting to speak with (clinical staff, provider,  specific staff member): PCP/MA    Do you know the name of the person who called: FABIO TALBOT    What was the call regarding: PATIENT CALLING TO CHECK ON THE STATUS OF REQUEST. NEED SENT TODAY TO [facility] ON [address]    Is it okay if the provider responds through [employer]: CALLBACK IF NEEDED

## 2023-12-30 ENCOUNTER — DOCUMENTATION (OUTPATIENT)
Dept: FAMILY MEDICINE CLINIC | Facility: CLINIC | Age: 83
End: 2023-12-30
Payer: MEDICARE

## 2023-12-30 RX ORDER — CLOTRIMAZOLE/BETAMETHASONE DIP 1 %-0.05 %
1 CREAM (GRAM) TOPICAL 2 TIMES DAILY
Qty: 30 G | Refills: 0 | Status: SHIPPED | OUTPATIENT
Start: 2023-12-30 | End: 2024-01-09

## 2023-12-30 RX ORDER — CLOTRIMAZOLE/BETAMETHASONE DIP 1 %-0.05 %
1 CREAM (GRAM) TOPICAL 2 TIMES DAILY
Qty: 30 G | Refills: 0 | Status: SHIPPED | OUTPATIENT
Start: 2023-12-30 | End: 2023-12-30 | Stop reason: SDUPTHER

## 2024-01-03 ENCOUNTER — TELEPHONE (OUTPATIENT)
Dept: FAMILY MEDICINE CLINIC | Facility: CLINIC | Age: 84
End: 2024-01-03
Payer: MEDICARE

## 2024-01-03 ENCOUNTER — HOME CARE VISIT (OUTPATIENT)
Dept: HOME HEALTH SERVICES | Facility: HOME HEALTHCARE | Age: 84
End: 2024-01-03
Payer: MEDICARE

## 2024-01-03 VITALS
OXYGEN SATURATION: 94 % | RESPIRATION RATE: 18 BRPM | DIASTOLIC BLOOD PRESSURE: 64 MMHG | SYSTOLIC BLOOD PRESSURE: 134 MMHG | HEART RATE: 76 BPM

## 2024-01-03 PROCEDURE — G0299 HHS/HOSPICE OF RN EA 15 MIN: HCPCS

## 2024-01-03 PROCEDURE — G0157 HHC PT ASSISTANT EA 15: HCPCS

## 2024-01-03 RX ORDER — INSULIN LISPRO 100 [IU]/ML
15 INJECTION, SOLUTION INTRAVENOUS; SUBCUTANEOUS
Qty: 9 ML | Refills: 11 | OUTPATIENT
Start: 2024-01-03

## 2024-01-03 NOTE — TELEPHONE ENCOUNTER
Pharmacist from [facility] called requesting to change patients Novolog to preferred alternative which is Humalog, and it's the same medication basically with the same SIG, just different brand. I gave the verbal ok.

## 2024-01-04 ENCOUNTER — TELEPHONE (OUTPATIENT)
Dept: FAMILY MEDICINE CLINIC | Facility: CLINIC | Age: 84
End: 2024-01-04
Payer: MEDICARE

## 2024-01-04 VITALS
DIASTOLIC BLOOD PRESSURE: 72 MMHG | RESPIRATION RATE: 18 BRPM | OXYGEN SATURATION: 96 % | HEART RATE: 70 BPM | SYSTOLIC BLOOD PRESSURE: 130 MMHG | OXYGEN SATURATION: 93 % | RESPIRATION RATE: 18 BRPM | TEMPERATURE: 96.7 F | TEMPERATURE: 97 F | HEART RATE: 74 BPM | SYSTOLIC BLOOD PRESSURE: 142 MMHG | DIASTOLIC BLOOD PRESSURE: 60 MMHG

## 2024-01-04 NOTE — HOME HEALTH
Routine Visit Note: LPN    Skill/education provided: Instructed on the disease process of congestive heart failure. Instructed on daily weight. instructed on diet related to CHF. Medication management.  continue Heart      Patient/caregiver response     Plan for next visit: Instruct on diabetic foot care. Instruct on the disease process of congestive heart failure. Instruct on daily weight. Instruct on diet related to CHF. Medication management.       Other pertinent info: N/A

## 2024-01-04 NOTE — TELEPHONE ENCOUNTER
CRISTOPHER CALLED TO REQUEST A VERBAL ORDER FOR WOUND CARE FOR THE PATIENT. SHE REPORTS THAT WHEN SHE SAW THE PATIENT YESTERDAY 1/3, HE HAD TINY BLISTERS ALL OVER HIS SCROTUM THAT HAD RUPTURED. SHE REPORTS THAT SHE TOOK A PHOTO AND UPLOADED IT TO HIS CHART. SHE STATES THAT THERE MAY BE EARLIER PHOTOS IN HIS CHART SHOWING THE DISCOLORATION THAT HAD DEVELOPED LAST WEEK, AND TO REFER TO THE MOST RECENT PICTURE ON 1/3.  CRISTOPHER PROVIDED HER CALLBACK NUMBER AS FOLLOWS: [phone].

## 2024-01-04 NOTE — TELEPHONE ENCOUNTER
Can someone advise on this? HH is wanting a verbal ok for wound care; there are images sent in the media.

## 2024-01-05 ENCOUNTER — TELEPHONE (OUTPATIENT)
Dept: FAMILY MEDICINE CLINIC | Facility: CLINIC | Age: 84
End: 2024-01-05
Payer: MEDICARE

## 2024-01-05 NOTE — TELEPHONE ENCOUNTER
I do not know this patient's medical conditions at all I do not feel comfortable calling in anything for what is described as a wound on the scrotum without seeing patient this could be cellulitis he could need IV or oral antibiotics I would recommend patient being seen in person here or at urgent care or the ER

## 2024-01-05 NOTE — TELEPHONE ENCOUNTER
AGNUS, RN FROM [facility] CALLED TO SEE IF SOME NYSTATIN CREAM OR POWDER CAN BE CALLED IN FOR HIM TODAY. SHE SAID THAT HE HAS WOUND ON SSCROTUM.    CALL BACK # [phone]     [id number] - [place], [place] - [address] - [phone]  - [phone] FX [address] [facility] [address] Phone: [phone] Fax: [phone] Hours: Open 24 hours

## 2024-01-05 NOTE — TELEPHONE ENCOUNTER
Relayed message from ADRYAN that patient will need to be seen or go to UC for evaluation of this area to confirm that it is not cellulitis. Advised the next opening would be with ADRYAN however he didn't want to see her or UC. Next appt with GLADIS was Friday, pt declined to schedule this appt at this time and advised that he can call back to check for cancellations.

## 2024-01-08 RX ORDER — HYDRALAZINE HYDROCHLORIDE 100 MG/1
100 TABLET, FILM COATED ORAL 2 TIMES DAILY
Qty: 180 TABLET | Refills: 1 | Status: SHIPPED | OUTPATIENT
Start: 2024-01-08

## 2024-01-08 RX ORDER — FENOFIBRATE 145 MG/1
145 TABLET, COATED ORAL DAILY
Qty: 90 TABLET | Refills: 1 | Status: SHIPPED | OUTPATIENT
Start: 2024-01-08

## 2024-01-08 RX ORDER — AMOXICILLIN 250 MG
1 CAPSULE ORAL 2 TIMES DAILY
Qty: 180 TABLET | Refills: 1 | Status: SHIPPED | OUTPATIENT
Start: 2024-01-08

## 2024-01-08 RX ORDER — SIMVASTATIN 20 MG
20 TABLET ORAL NIGHTLY
Qty: 90 TABLET | Refills: 1 | Status: SHIPPED | OUTPATIENT
Start: 2024-01-08

## 2024-01-08 RX ORDER — METOPROLOL TARTRATE 50 MG/1
50 TABLET, FILM COATED ORAL 2 TIMES DAILY
Qty: 180 TABLET | Refills: 1 | Status: SHIPPED | OUTPATIENT
Start: 2024-01-08

## 2024-01-08 RX ORDER — FINASTERIDE 1 MG/1
5 TABLET, FILM COATED ORAL DAILY
Status: CANCELLED | OUTPATIENT
Start: 2024-01-08

## 2024-01-08 NOTE — TELEPHONE ENCOUNTER
Fine for the simethicone with 90-day supply and 3 refills  Not sure why he is taking finasteride 1 mg 5 times daily when he could take 5 mg once daily.  We need to ask him about that.

## 2024-01-08 NOTE — TELEPHONE ENCOUNTER
Refilled all meds requested except Finasteride and Gas X. Doesn't look like we've refilled them before and wanted to be sure it's ok with the SIG he's requesting:  Finasteride, 5 pills a day, Gas X 3 times a day

## 2024-01-08 NOTE — TELEPHONE ENCOUNTER
"Caller: Javeir Lerner MONIK \"Yfn\"    Relationship: Self    Best call back number: [phone]    Requested Prescriptions:   Requested Prescriptions     Pending Prescriptions Disp Refills    apixaban (Eliquis) 2.5 MG tablet tablet 60 tablet      Sig: Take 1 tablet by mouth 2 (Two) Times a Day.    sennosides-docusate (senna-docusate sodium) 8.6-50 MG per tablet       Sig: Take 1 tablet by mouth 2 (Two) Times a Day.    finasteride (PROPECIA) 1 MG tablet       Sig: Take 5 tablets by mouth Daily.    fenofibrate (TRICOR) 145 MG tablet       Sig: Take 1 tablet by mouth Daily.    hydrALAZINE (APRESOLINE) 100 MG tablet 180 tablet 1     Sig: Take 1 tablet by mouth 2 (Two) Times a Day.    metoprolol tartrate (LOPRESSOR) 50 MG tablet 60 tablet 11     Sig: Take 1 tablet by mouth 2 (Two) Times a Day.    simethicone (Gas-X) 80 MG chewable tablet       Sig: Chew 1 tablet 3 (Three) Times a Day.    simvastatin (ZOCOR) 20 MG tablet 90 tablet 3     Sig: Take 1 tablet by mouth Every Night.        Pharmacy where request should be sent: [facility] [address] [phone] [place] [phone] FX     Last office visit with prescribing clinician: 12/19/2023   Last telemedicine visit with prescribing clinician: Visit date not found   Next office visit with prescribing clinician: 1/26/2024     Additional details provided by patient:     Does the patient have less than a 3 day supply:  [] Yes  [x] No    Would you like a call back once the refill request has been completed: [] Yes [x] No    If the office needs to give you a call back, can they leave a voicemail: [] Yes [x] No    Clay Valencia Rep   01/08/24 11:06 EST         "

## 2024-01-09 ENCOUNTER — TELEPHONE (OUTPATIENT)
Dept: FAMILY MEDICINE CLINIC | Facility: CLINIC | Age: 84
End: 2024-01-09

## 2024-01-09 ENCOUNTER — HOME CARE VISIT (OUTPATIENT)
Dept: HOME HEALTH SERVICES | Facility: HOME HEALTHCARE | Age: 84
End: 2024-01-09
Payer: MEDICARE

## 2024-01-09 VITALS
TEMPERATURE: 96.6 F | RESPIRATION RATE: 16 BRPM | OXYGEN SATURATION: 95 % | SYSTOLIC BLOOD PRESSURE: 130 MMHG | HEART RATE: 76 BPM | DIASTOLIC BLOOD PRESSURE: 70 MMHG

## 2024-01-09 PROCEDURE — G0495 RN CARE TRAIN/EDU IN HH: HCPCS

## 2024-01-09 RX ORDER — FINASTERIDE 5 MG/1
5 TABLET, FILM COATED ORAL DAILY
Qty: 90 TABLET | Refills: 1 | Status: SHIPPED | OUTPATIENT
Start: 2024-01-09

## 2024-01-09 RX ORDER — SIMETHICONE 80 MG
80 TABLET,CHEWABLE ORAL 3 TIMES DAILY
Qty: 270 TABLET | Refills: 1 | Status: SHIPPED | OUTPATIENT
Start: 2024-01-09

## 2024-01-09 NOTE — TELEPHONE ENCOUNTER
I do not think he needs to go suddenly from 45 units a day to 80 units a day of the Levemir.  I would suggest that he go from 45 units once a day to 25 units twice a day and increase by 5 units every 5 days until he gets up to 40 units twice a day.

## 2024-01-09 NOTE — TELEPHONE ENCOUNTER
Pt states he's a little confused; he seen a piece of paper from [facility] that says he should be taking his Levemir 45 units every am, and another one from there that says take 40 units BID, so he's confused about how he should be taking it. He said he's been taking it 45 units every morning and his glucose has been a little high- 200+. He thinks maybe he should be taking it twice a day, please advise.  FYI he is still taking Novolog 15 units 3 times a day (No confusion with that one).

## 2024-01-09 NOTE — TELEPHONE ENCOUNTER
Spoke with pt and he said he does take Finasteride 5mg once a day- he thinks it may have gotten confused while he was at [facility]. Sent both in.

## 2024-01-09 NOTE — TELEPHONE ENCOUNTER
Caller: KARLENE [facility]    Relationship: Home Health    Best call back number: [phone]     Which medication are you concerned about: INSULINS     Who prescribed you this medication: DOCTOR CINDY SPRAGUE    What are your concerns: THE CALLER STATES THAT THE PATIENTS INSULIN WAS CHANGED AT [facility] THE PATIENT IS CONFUSED BY THE DIRECTIONS HE NEEDS TO KNOW IF HE TAKES THE MEDICATION ONCE A DAY OR TWICE A DAY PLEASE CALL THE PATIENT TO DISCUSS

## 2024-01-09 NOTE — TELEPHONE ENCOUNTER
Pt notified, I told him we will wait and see what dose works best for him before we change the SIG in his chart.

## 2024-01-09 NOTE — TELEPHONE ENCOUNTER
Lotrisone 1-0.05 % cream     Caller: [facility] - [place], [place] - [address] [place] [address] [phone] [facility] [phone]     Relationship: Pharmacy    Best call back number: [phone]    Which medication are you concerned about: Lotrisone 1-0.05 % cream     Who prescribed you this medication: CORY    What are your concerns: MEDICATION IS DISCONTINUED.  PLEASE ADVISE

## 2024-01-10 NOTE — HOME HEALTH
sn visit today. Instructed pt to weigh daily. Verbalized understanding. Called Dr Kirkpatrick office and spoke to agent regarding levemir insulin. Paperwork from [facility] pharmacy instructs to take 45units levemir every 12hrs. medication profile reports levemir 45units daily. Awaiting call back. Sn visit as scheduled next week

## 2024-01-11 ENCOUNTER — HOME CARE VISIT (OUTPATIENT)
Dept: HOME HEALTH SERVICES | Facility: HOME HEALTHCARE | Age: 84
End: 2024-01-11
Payer: MEDICARE

## 2024-01-11 VITALS
HEART RATE: 75 BPM | SYSTOLIC BLOOD PRESSURE: 120 MMHG | DIASTOLIC BLOOD PRESSURE: 73 MMHG | RESPIRATION RATE: 16 BRPM | OXYGEN SATURATION: 95 %

## 2024-01-11 PROCEDURE — G0157 HHC PT ASSISTANT EA 15: HCPCS

## 2024-01-12 RX ORDER — BUMETANIDE 1 MG/1
1 TABLET ORAL DAILY
Qty: 30 TABLET | Refills: 0 | Status: SHIPPED | OUTPATIENT
Start: 2024-01-12

## 2024-01-18 ENCOUNTER — HOME CARE VISIT (OUTPATIENT)
Dept: HOME HEALTH SERVICES | Facility: HOME HEALTHCARE | Age: 84
End: 2024-01-18
Payer: MEDICARE

## 2024-01-18 VITALS
TEMPERATURE: 97.3 F | HEART RATE: 76 BPM | RESPIRATION RATE: 16 BRPM | DIASTOLIC BLOOD PRESSURE: 72 MMHG | SYSTOLIC BLOOD PRESSURE: 128 MMHG | OXYGEN SATURATION: 97 %

## 2024-01-18 PROCEDURE — G0495 RN CARE TRAIN/EDU IN HH: HCPCS

## 2024-01-18 NOTE — HOME HEALTH
sn visit today. Pt is weighing daily and today's weight= 290. Fasting fsbs 172. Pt tapering levemir insulin. Plan for sn visit as scheduled next week for disease process T/I.

## 2024-01-19 ENCOUNTER — HOME CARE VISIT (OUTPATIENT)
Dept: HOME HEALTH SERVICES | Facility: HOME HEALTHCARE | Age: 84
End: 2024-01-19
Payer: MEDICARE

## 2024-01-19 PROCEDURE — G0151 HHCP-SERV OF PT,EA 15 MIN: HCPCS

## 2024-01-19 RX ORDER — INSULIN DETEMIR 100 [IU]/ML
45 INJECTION, SOLUTION SUBCUTANEOUS DAILY
Qty: 9 ML | Refills: 11 | Status: SHIPPED | OUTPATIENT
Start: 2024-01-19

## 2024-01-21 VITALS
DIASTOLIC BLOOD PRESSURE: 68 MMHG | RESPIRATION RATE: 16 BRPM | OXYGEN SATURATION: 97 % | SYSTOLIC BLOOD PRESSURE: 134 MMHG | HEART RATE: 72 BPM | TEMPERATURE: 97.5 F

## 2024-01-24 ENCOUNTER — HOME CARE VISIT (OUTPATIENT)
Dept: HOME HEALTH SERVICES | Facility: HOME HEALTHCARE | Age: 84
End: 2024-01-24
Payer: MEDICARE

## 2024-01-24 ENCOUNTER — TELEPHONE (OUTPATIENT)
Dept: FAMILY MEDICINE CLINIC | Facility: CLINIC | Age: 84
End: 2024-01-24

## 2024-01-24 PROCEDURE — G0300 HHS/HOSPICE OF LPN EA 15 MIN: HCPCS

## 2024-01-24 NOTE — TELEPHONE ENCOUNTER
Pharmacy Name:  [facility]    Reference Number (if applicable): [id number]    Pharmacy representative name: TANIA    Pharmacy representative phone number: [phone]    What medication are you calling in regards to:     insulin detemir (Levemir FlexPen) 100 UNIT/ML injection  45 Units, Daily       What question does the pharmacy have: NOT COVERED UNDER THE INSURANCE PLAN. NEEDS A CALL ASAP    Who is the provider that prescribed the medication: CINDYBUSSUM    Additional notes:

## 2024-01-24 NOTE — HOME HEALTH
Routine Visit Note: LPN    Skill/education provided: Instructed on the disease process of congestive heart failure. Instructed on daily weight. Medication management. Instructed on diabeties management.    Patient/caregiver response: Patient verbalized understanding of instructions.       Plan for next visit: Continue with POC, instructing on the disease process of congestive heart failure. Instruct on daily weight.     Other pertinent info:  Today's weight is 292 mg/dl

## 2024-01-25 ENCOUNTER — HOME CARE VISIT (OUTPATIENT)
Dept: HOME HEALTH SERVICES | Facility: HOME HEALTHCARE | Age: 84
End: 2024-01-25
Payer: MEDICARE

## 2024-01-25 PROCEDURE — G0157 HHC PT ASSISTANT EA 15: HCPCS

## 2024-01-25 RX ORDER — INSULIN GLARGINE-YFGN 100 [IU]/ML
45 INJECTION, SOLUTION SUBCUTANEOUS 2 TIMES DAILY
Qty: 90 ML | Refills: 3 | Status: SHIPPED | OUTPATIENT
Start: 2024-01-25 | End: 2024-01-26 | Stop reason: SDUPTHER

## 2024-01-25 NOTE — TELEPHONE ENCOUNTER
Spoke with Marie at [facility], this request is very time sensitive. Levemir not covered under his plan, but Toujeo Solo or Toujeo Max solo are covered, also Semglee is covered. Please send in one of those. I deleted the Levemir from his active list but here was the SIG/instructions that were on it:      insulin detemir (Levemir FlexPen) 100 UNIT/ML injection  45 Units, Daily 11 ordered          Summary: Inject 45 Units under the skin into the appropriate area as directed Daily. tapered dose, Starting Fri 1/19/2024, Normal  Dose, Route, Frequency: 45 Units, Subcutaneous, DailyStart: 01/19/2024Ord/Sold: 01/19/2024 (O)Ordered On: 01/19/2024Pharmacy: [facility] - [address] [phone] [facility] [phone] FXReportAdh: Dx Associated: Taking: Long-term: Med Note:              Change  Patient Sig: Inject 45 Units under the skin into the appropriate area as directed Daily. tapered dose  Authorized By: Javier Kirkpatrick MD  Dispense: 9 mL  Admin Instructions: tapered dose  Dose History

## 2024-01-26 ENCOUNTER — OFFICE VISIT (OUTPATIENT)
Dept: FAMILY MEDICINE CLINIC | Facility: CLINIC | Age: 84
End: 2024-01-26
Payer: MEDICARE

## 2024-01-26 ENCOUNTER — TELEPHONE (OUTPATIENT)
Dept: FAMILY MEDICINE CLINIC | Facility: CLINIC | Age: 84
End: 2024-01-26

## 2024-01-26 VITALS
BODY MASS INDEX: 41.38 KG/M2 | WEIGHT: 295.6 LBS | RESPIRATION RATE: 16 BRPM | SYSTOLIC BLOOD PRESSURE: 144 MMHG | HEART RATE: 76 BPM | OXYGEN SATURATION: 96 % | HEIGHT: 71 IN | DIASTOLIC BLOOD PRESSURE: 90 MMHG

## 2024-01-26 VITALS
RESPIRATION RATE: 20 BRPM | OXYGEN SATURATION: 98 % | TEMPERATURE: 97.6 F | DIASTOLIC BLOOD PRESSURE: 82 MMHG | SYSTOLIC BLOOD PRESSURE: 150 MMHG | HEART RATE: 72 BPM

## 2024-01-26 VITALS
HEART RATE: 84 BPM | RESPIRATION RATE: 18 BRPM | DIASTOLIC BLOOD PRESSURE: 78 MMHG | OXYGEN SATURATION: 96 % | SYSTOLIC BLOOD PRESSURE: 133 MMHG

## 2024-01-26 DIAGNOSIS — E66.01 MORBID (SEVERE) OBESITY DUE TO EXCESS CALORIES: ICD-10-CM

## 2024-01-26 DIAGNOSIS — I48.0 PAROXYSMAL ATRIAL FIBRILLATION: ICD-10-CM

## 2024-01-26 DIAGNOSIS — Z79.4 UNCONTROLLED DIABETES MELLITUS WITH HYPERGLYCEMIA, WITH LONG-TERM CURRENT USE OF INSULIN: ICD-10-CM

## 2024-01-26 DIAGNOSIS — E11.65 UNCONTROLLED DIABETES MELLITUS WITH HYPERGLYCEMIA, WITH LONG-TERM CURRENT USE OF INSULIN: ICD-10-CM

## 2024-01-26 DIAGNOSIS — N18.31 STAGE 3A CHRONIC KIDNEY DISEASE: ICD-10-CM

## 2024-01-26 DIAGNOSIS — Z00.00 ENCOUNTER FOR SUBSEQUENT ANNUAL WELLNESS VISIT (AWV) IN MEDICARE PATIENT: Primary | ICD-10-CM

## 2024-01-26 DIAGNOSIS — I10 ESSENTIAL HYPERTENSION: ICD-10-CM

## 2024-01-26 LAB
EXPIRATION DATE: NORMAL
Lab: NORMAL
POC CREATININE URINE: 0.9
POC MICROALBUMIN URINE: 10

## 2024-01-26 RX ORDER — INSULIN GLARGINE-YFGN 100 [IU]/ML
45 INJECTION, SOLUTION SUBCUTANEOUS 2 TIMES DAILY
Qty: 90 ML | Refills: 3 | Status: SHIPPED | OUTPATIENT
Start: 2024-01-26 | End: 2024-01-26 | Stop reason: SDUPTHER

## 2024-01-26 RX ORDER — INSULIN GLARGINE-YFGN 100 [IU]/ML
45 INJECTION, SOLUTION SUBCUTANEOUS 2 TIMES DAILY
Qty: 90 ML | Refills: 3 | Status: SHIPPED | OUTPATIENT
Start: 2024-01-26

## 2024-01-26 NOTE — TELEPHONE ENCOUNTER
We are titrating his long-acting insulin up while decreasing his mealtime insulin.  He is currently up to Levemir 35 in the morning and 30 mg in the evening.  So I need the new Semglee dose to be 45 units twice a day.

## 2024-01-26 NOTE — TELEPHONE ENCOUNTER
Martha, pharmacist at [facility] called about the script that was sent in for Semglee in place of Levemir. The Levemir SIG was 45 units daily as directed (tapering dose), but the Semglee was sent in with the SIG of 45 units BID. Please clarify/resend, as the SIGs should be the exact same on those meds.  Reference # [id number]

## 2024-01-27 NOTE — PROGRESS NOTES
The ABCs of the Annual Wellness Visit  Subsequent Medicare Wellness Visit    Chief Complaint   Patient presents with    Medicare Wellness-subsequent      Subjective   History of Present Illness:  Javier Lerner is a 83 y.o. male who presents for a Subsequent Medicare Wellness Visit.    The following portions of the patient's history were reviewed and   updated as appropriate: allergies, current medications, past family history, past medical history, past social history, past surgical history, and problem list.    Compared to one year ago, the patient feels his physical   health is worse.    Compared to one year ago, the patient feels his mental   health is the same.    Recent Hospitalizations:  He was admitted within the past 365 days .      Current Medical Providers:  Patient Care Team:  Javier Kirkpatrick MD as PCP - General (Family Medicine)  Ish Sewell DO as Consulting Physician (Cardiology)  Steve Monet MD as Consulting Physician (Endocrinology)  Steve Monet MD as Consulting Physician (Endocrinology)  Prashant King MD as Consulting Physician (Cardiology)    Outpatient Medications Prior to Visit   Medication Sig Dispense Refill    alfuzosin (UROXATRAL) 10 MG 24 hr tablet Take 1 tablet by mouth Daily.      apixaban (Eliquis) 2.5 MG tablet tablet Take 1 tablet by mouth 2 (Two) Times a Day. 180 tablet 1    B-D UF III MINI PEN NEEDLES 31G X 5 MM misc USE THREE TIMES A DAY WITH INSULIN 270 each 3    bumetanide (BUMEX) 1 MG tablet Take 1 tablet by mouth Daily. 30 tablet 0    cholecalciferol (VITAMIN D3) 25 MCG (1000 UT) tablet Take 1 tablet by mouth Daily.      Continuous Blood Gluc  (FreeStyle Hernan 14 Day [place]) device 1 application As Needed (test glucose). Dx: E11.9 1 each 24    Continuous Blood Gluc Sensor (FreeStyle Hernan 14 Day Sensor) misc 1 application As Needed (test glucose). Dx: E11.9 1 each 24    Diclofenac Sodium (VOLTAREN) 1 % gel gel Apply 4 g  topically to the appropriate area as directed 3 (Three) Times a Day.      empagliflozin (Jardiance) 10 MG tablet tablet Take 1 tablet by mouth Daily. 90 tablet 3    fenofibrate (TRICOR) 145 MG tablet Take 1 tablet by mouth Daily. 90 tablet 1    finasteride (PROPECIA) 1 MG tablet Take 5 tablets by mouth Daily.      finasteride (Proscar) 5 MG tablet Take 1 tablet by mouth Daily. 90 tablet 1    glucose blood test strip OneTouch Verio test strips   USE TO TEST BLOOD GLUCOSE 3 TIMES DAILY e11.65      hydrALAZINE (APRESOLINE) 100 MG tablet Take 1 tablet by mouth 2 (Two) Times a Day. 180 tablet 1    Insulin Lispro (HumaLOG) 100 UNIT/ML injection Inject 15 Units under the skin into the appropriate area as directed 3 (Three) Times a Day Before Meals. 9 mL 11    Insulin Regular Human, Conc, (HumuLIN R U-500 KwikPen) 500 UNIT/ML solution pen-injector CONCENTRATED injection INJECT 80 UNITS THREE TIMES A DAY 42 mL 3    metoprolol tartrate (LOPRESSOR) 50 MG tablet Take 1 tablet by mouth 2 (Two) Times a Day. 180 tablet 1    multivitamins-minerals (PRESERVISION AREDS 2) capsule capsule Take 1 capsule by mouth 2 (Two) Times a Day.      nitroglycerin (NITROSTAT) 0.4 MG SL tablet Place 1 tablet under the tongue Every 5 (Five) Minutes As Needed for Chest Pain. Take no more than 3 doses in 15 minutes. 100 tablet 1    OneTouch Verio test strip       sennosides-docusate (senna-docusate sodium) 8.6-50 MG per tablet Take 1 tablet by mouth 2 (Two) Times a Day. 180 tablet 1    simethicone (Gas-X) 80 MG chewable tablet Chew 1 tablet 3 (Three) Times a Day. 270 tablet 1    simvastatin (ZOCOR) 20 MG tablet Take 1 tablet by mouth Every Night. 90 tablet 1    triamcinolone (KENALOG) 0.1 % cream Apply 1 application topically to the appropriate area as directed 2 (Two) Times a Day. 45 g 3    Insulin Glargine-yfgn (Semglee, yfgn,) 100 UNIT/ML solution pen-injector Inject 45 Units under the skin into the appropriate area as directed 2 (Two) Times a Day.  "90 mL 3     No facility-administered medications prior to visit.       No opioid medication identified on active medication list. I have reviewed chart for other potential  high risk medication/s and harmful drug interactions in the elderly.        Aspirin is not on active medication list.  Aspirin use is not indicated based on review of current medical condition/s. Risk of harm outweighs potential benefits.  .    Patient Active Problem List   Diagnosis    OMAIRA (obstructive sleep apnea)    Uncontrolled diabetes mellitus with hyperglycemia, with long-term current use of insulin    Hypertension    CKD (chronic kidney disease) stage 3, GFR 30-59 ml/min    Chronic fatigue    Class 3 severe obesity with body mass index (BMI) of 40.0 to 44.9 in adult    Chronotropic incompetence    Lower urinary tract symptoms due to benign prostatic hyperplasia    Raised prostate specific antigen    Type 2 diabetes mellitus    Presence of cardiac pacemaker    Paroxysmal atrial fibrillation    Pulmonary HTN     Advance Care Planning  ACP discussion was declined by the patient. Patient has an advance directive in EMR which is still valid.     Review of Systems      Objective      Vitals:    01/26/24 1411   BP: 144/90   Pulse: 76   Resp: 16   SpO2: 96%   Weight: 134 kg (295 lb 9.6 oz)   Height: 180.3 cm (71\")   PainSc: 0-No pain     BMI Readings from Last 1 Encounters:   01/26/24 41.23 kg/m²   BMI is above normal parameters. Recommendations include: nutrition counseling    Does the patient have evidence of cognitive impairment? No    Physical Exam            HEALTH RISK ASSESSMENT    Smoking Status:  Social History     Tobacco Use   Smoking Status Former    Packs/day: 0.50    Years: 60.00    Additional pack years: 0.00    Total pack years: 30.00    Types: Cigarettes, Pipe    Quit date: 1/4/2014    Years since quitting: 10.0    Passive exposure: Never   Smokeless Tobacco Never     Alcohol Consumption:  Social History     Substance and Sexual " Activity   Alcohol Use Yes    Comment: 3-4 drinks per month occassional     Fall Risk Screen:    MERYLMILAN Fall Risk Assessment was completed, and patient is at LOW risk for falls.Assessment completed on:2024    Depression Screenin/26/2024     2:26 PM   PHQ-2/PHQ-9 Depression Screening   Little Interest or Pleasure in Doing Things 0-->not at all   Feeling Down, Depressed or Hopeless 0-->not at all   PHQ-9: Brief Depression Severity Measure Score 0       Health Habits and Functional and Cognitive Screenin/23/2023    10:37 AM   Functional & Cognitive Status   Do you have difficulty preparing food and eating? No   Do you have difficulty bathing yourself, getting dressed or grooming yourself? No   Do you have difficulty using the toilet? No   Do you have difficulty moving around from place to place? Yes   Do you have trouble with steps or getting out of a bed or a chair? Yes   Current Diet Diabetic Diet        Current Diet Comment primarily protein, eggs   Dental Exam Up to date   Eye Exam Up to date   Exercise (times per week) 0 times per week   Current Exercises Include No Regular Exercise   Do you need help using the phone?  No   Are you deaf or do you have serious difficulty hearing?  No   Do you need help to go to places out of walking distance? No   Do you need help shopping? No   Do you need help preparing meals?  No   Do you need help with housework?  No   Do you need help with laundry? No   Do you need help taking your medications? No   Do you need help managing money? No   Do you ever drive or ride in a car without wearing a seat belt? No   Have you felt unusual stress, anger or loneliness in the last month? No   Who do you live with? Spouse   If you need help, do you have trouble finding someone available to you? No   Have you been bothered in the last four weeks by sexual problems? No   Do you have difficulty concentrating, remembering or making decisions? No       Age-appropriate  Screening Schedule:  Refer to the list below for future screening recommendations based on patient's age, sex and/or medical conditions. Orders for these recommended tests are listed in the plan section. The patient has been provided with a written plan.    Health Maintenance   Topic Date Due    Pneumococcal Vaccine 65+ (1 of 2 - PCV) Never done    TDAP/TD VACCINES (1 - Tdap) Never done    ZOSTER VACCINE (1 of 2) Never done    DIABETIC EYE EXAM  12/02/2021    INFLUENZA VACCINE  08/01/2023    HEMOGLOBIN A1C  10/26/2023    LIPID PANEL  01/23/2024    COVID-19 Vaccine (4 - 2023-24 season) 03/09/2024 (Originally 9/1/2023)    ANNUAL WELLNESS VISIT  01/26/2025    URINE MICROALBUMIN  01/26/2025    BMI FOLLOWUP  01/26/2025              Assessment & Plan     CMS Preventative Services Quick Reference  Risk Factors Identified During Encounter  Immunizations Discussed/Encouraged: Influenza, Prevnar 20 (Pneumococcal 20-valent conjugate), and Shingrix  The above risks/problems have been discussed with the patient.  Follow up actions/plans if indicated are seen below in the Assessment/Plan Section.  Pertinent information has been shared with the patient in the After Visit Summary.    Diagnoses and all orders for this visit:    1. Encounter for subsequent annual wellness visit (AWV) in Medicare patient (Primary)    2. Uncontrolled diabetes mellitus with hyperglycemia, with long-term current use of insulin  -     POCT microalbumin    3. Essential hypertension    4. Paroxysmal atrial fibrillation    5. Morbid (severe) obesity due to excess calories    6. Stage 3a chronic kidney disease        Follow Up:      An After Visit Summary and PPPS were given to the patient.

## 2024-01-30 ENCOUNTER — HOME CARE VISIT (OUTPATIENT)
Dept: HOME HEALTH SERVICES | Facility: HOME HEALTHCARE | Age: 84
End: 2024-01-30
Payer: MEDICARE

## 2024-01-30 VITALS
RESPIRATION RATE: 16 BRPM | HEART RATE: 72 BPM | SYSTOLIC BLOOD PRESSURE: 124 MMHG | TEMPERATURE: 98.1 F | OXYGEN SATURATION: 96 % | DIASTOLIC BLOOD PRESSURE: 78 MMHG

## 2024-01-30 PROCEDURE — G0495 RN CARE TRAIN/EDU IN HH: HCPCS

## 2024-02-01 ENCOUNTER — HOME CARE VISIT (OUTPATIENT)
Dept: HOME HEALTH SERVICES | Facility: HOME HEALTHCARE | Age: 84
End: 2024-02-01
Payer: MEDICARE

## 2024-02-01 PROCEDURE — G0157 HHC PT ASSISTANT EA 15: HCPCS

## 2024-02-02 VITALS
DIASTOLIC BLOOD PRESSURE: 72 MMHG | HEART RATE: 75 BPM | OXYGEN SATURATION: 97 % | TEMPERATURE: 98 F | SYSTOLIC BLOOD PRESSURE: 138 MMHG | RESPIRATION RATE: 18 BRPM

## 2024-02-08 ENCOUNTER — TELEPHONE (OUTPATIENT)
Dept: FAMILY MEDICINE CLINIC | Facility: CLINIC | Age: 84
End: 2024-02-08

## 2024-02-08 ENCOUNTER — HOME CARE VISIT (OUTPATIENT)
Dept: HOME HEALTH SERVICES | Facility: HOME HEALTHCARE | Age: 84
End: 2024-02-08
Payer: MEDICARE

## 2024-02-08 PROCEDURE — G0157 HHC PT ASSISTANT EA 15: HCPCS

## 2024-02-08 RX ORDER — MECLIZINE HYDROCHLORIDE 25 MG/1
25 TABLET ORAL 3 TIMES DAILY PRN
Qty: 30 TABLET | Refills: 0 | Status: SHIPPED | OUTPATIENT
Start: 2024-02-08

## 2024-02-08 NOTE — TELEPHONE ENCOUNTER
Edgar called and said this pt has complained about being dizzy today. Edgar said something about vertigo. He said he wanted to let Dr GRIFFITH know to see if he wanted to send in some meds for him. Please call them back to let them know.

## 2024-02-09 VITALS
OXYGEN SATURATION: 97 % | SYSTOLIC BLOOD PRESSURE: 135 MMHG | DIASTOLIC BLOOD PRESSURE: 64 MMHG | TEMPERATURE: 98.6 F | RESPIRATION RATE: 18 BRPM | HEART RATE: 76 BPM

## 2024-02-12 ENCOUNTER — HOME CARE VISIT (OUTPATIENT)
Dept: HOME HEALTH SERVICES | Facility: HOME HEALTHCARE | Age: 84
End: 2024-02-12
Payer: MEDICARE

## 2024-03-06 ENCOUNTER — OFFICE VISIT (OUTPATIENT)
Dept: ENDOCRINOLOGY | Facility: CLINIC | Age: 84
End: 2024-03-06
Payer: MEDICARE

## 2024-03-06 VITALS
OXYGEN SATURATION: 96 % | WEIGHT: 295 LBS | DIASTOLIC BLOOD PRESSURE: 72 MMHG | SYSTOLIC BLOOD PRESSURE: 130 MMHG | BODY MASS INDEX: 41.3 KG/M2 | HEIGHT: 71 IN | HEART RATE: 81 BPM

## 2024-03-06 DIAGNOSIS — E11.65 UNCONTROLLED DIABETES MELLITUS WITH HYPERGLYCEMIA, WITH LONG-TERM CURRENT USE OF INSULIN: Primary | ICD-10-CM

## 2024-03-06 DIAGNOSIS — Z79.4 UNCONTROLLED DIABETES MELLITUS WITH HYPERGLYCEMIA, WITH LONG-TERM CURRENT USE OF INSULIN: Primary | ICD-10-CM

## 2024-03-06 PROBLEM — E11.9 TYPE 2 DIABETES MELLITUS: Status: RESOLVED | Noted: 2019-06-25 | Resolved: 2024-03-06

## 2024-03-06 LAB
EXPIRATION DATE: ABNORMAL
EXPIRATION DATE: ABNORMAL
GLUCOSE BLDC GLUCOMTR-MCNC: 214 MG/DL (ref 70–130)
HBA1C MFR BLD: 7.9 % (ref 4.5–5.7)
Lab: ABNORMAL
Lab: ABNORMAL

## 2024-03-06 PROCEDURE — 99213 OFFICE O/P EST LOW 20 MIN: CPT | Performed by: INTERNAL MEDICINE

## 2024-03-06 PROCEDURE — 82947 ASSAY GLUCOSE BLOOD QUANT: CPT | Performed by: INTERNAL MEDICINE

## 2024-03-06 NOTE — PROGRESS NOTES
"     Office Note      Date: 2024  Patient Name: Javier Lerner  MRN: [id number]  : 1940    Chief Complaint   Patient presents with    Diabetes     Uncontrolled diabetes mellitus with hyperglycemia, with long-term current use of insulin           History of Present Illness:   Javier Lerner is a 83 y.o. male who presents for Diabetes type 2.   Current RX 5 daily insulin shots.. he takes 2 shots per day of basal insulin and 2-3 shots of novolog.    Bg checks are done >10 times per day/ the last 2 weeks of data are reviewed=  0 % are reviewed. 27 % are in range 58 % areslightly high and 15 % are very high.    I last saw him in April of last year. Since then he has had issues with his kidneys and his heart and jardiance was added       Last A1c:  Hemoglobin A1C   Date Value Ref Range Status   2024 7.9 (A) 4.5 - 5.7 % Final   2023 8.10 (H) 4.80 - 5.60 % Final       Changes in health since last visit: see above. Last eye exam up to date  Feet- .    Subjective              Review of Systems:   Review of Systems   Cardiovascular:  Positive for leg swelling.       The following portions of the patient's history were reviewed and updated as appropriate: allergies, current medications, past family history, past medical history, past social history, past surgical history, and problem list.    Objective     Visit Vitals  /72 (BP Location: Right leg, Patient Position: Sitting, Cuff Size: Adult)   Pulse 81   Ht 180.3 cm (71\")   Wt 134 kg (295 lb)   SpO2 96%   BMI 41.14 kg/m²           Physical Exam:  Physical Exam  Vitals reviewed.   Constitutional:       Appearance: Normal appearance. He is normal weight.   Cardiovascular:      Pulses:           Dorsalis pedis pulses are 2+ on the right side and 2+ on the left side.        Posterior tibial pulses are 2+ on the right side and 2+ on the left side.   Musculoskeletal:      Right foot: Normal range of motion. No deformity, bunion, Charcot foot, " foot drop or prominent metatarsal heads.      Left foot: Normal range of motion. No deformity, bunion, Charcot foot, foot drop or prominent metatarsal heads.   Feet:      Right foot:      Protective Sensation: 10 sites tested.  10 sites sensed.      Skin integrity: Skin integrity normal.      Toenail Condition: Right toenails are normal.      Left foot:      Protective Sensation: 10 sites tested.  10 sites sensed.      Skin integrity: Skin integrity normal.      Toenail Condition: Left toenails are normal.      Comments: 3 + edema in both feet   Neurological:      Mental Status: He is alert.          Assessment / Plan      Assessment & Plan:  Problem List Items Addressed This Visit          Other    Uncontrolled diabetes mellitus with hyperglycemia, with long-term current use of insulin - Primary    Overview               Current Assessment & Plan     Stable with A1c about 8.  Hernan data show pretty stable numbers as well   I am fine  the current program. He is requiring 1/2 as much insulin as he had been needing in past.. maybe it's because of jardiance or because he has made changes          Relevant Medications    OneTouch Verio test strip    B-D UF III MINI PEN NEEDLES 31G X 5 MM misc    empagliflozin (Jardiance) 10 MG tablet tablet    Insulin Lispro (HumaLOG) 100 UNIT/ML injection    Insulin Glargine-yfgn (Semglee, yfgn,) 100 UNIT/ML solution pen-injector    Other Relevant Orders    POC Glycosylated Hemoglobin (Hb A1C) (Completed)    POC Glucose, Blood (Completed)         Electronically signed by : Steve Monet MD  03/06/2024

## 2024-03-06 NOTE — ASSESSMENT & PLAN NOTE
Stable with A1c about 8.  Hernan data show pretty stable numbers as well   I am fine  the current program. He is requiring 1/2 as much insulin as he had been needing in past.. maybe it's because of jardiance or because he has made changes

## 2024-03-14 RX ORDER — BUMETANIDE 1 MG/1
1 TABLET ORAL DAILY
Qty: 30 TABLET | Refills: 5 | Status: SHIPPED | OUTPATIENT
Start: 2024-03-14

## 2024-03-14 NOTE — TELEPHONE ENCOUNTER
"Caller: Javier Lerner \"Yfn\"    Relationship: Self    Best call back number: [phone]     Requested Prescriptions:   Requested Prescriptions     Pending Prescriptions Disp Refills    bumetanide (BUMEX) 1 MG tablet 30 tablet 0     Sig: Take 1 tablet by mouth Daily.        Pharmacy where request should be sent: [facility] [address] [phone] [facility] [phone]      Last office visit with prescribing clinician: 1/26/2024   Last telemedicine visit with prescribing clinician: Visit date not found   Next office visit with prescribing clinician: 7/26/2024     Additional details provided by patient:     Does the patient have less than a 3 day supply:  [x] Yes  [] No    Would you like a call back once the refill request has been completed: [x] Yes [] No    If the office needs to give you a call back, can they leave a voicemail: [x] Yes [] No    Clay Zuniga Rep   03/14/24 14:34 EDT         "

## 2024-03-22 RX ORDER — INSULIN LISPRO 100 [IU]/ML
15 INJECTION, SOLUTION INTRAVENOUS; SUBCUTANEOUS
Qty: 9 ML | Refills: 11 | OUTPATIENT
Start: 2024-03-22

## 2024-03-22 NOTE — TELEPHONE ENCOUNTER
"Caller: Javier Lerner \"Yfn\"    Relationship: Self    Best call back number: [phone]     Requested Prescriptions:   Requested Prescriptions     Pending Prescriptions Disp Refills    Insulin Lispro (HumaLOG) 100 UNIT/ML injection 9 mL 11     Sig: Inject 15 Units under the skin into the appropriate area as directed 3 (Three) Times a Day Before Meals.        Pharmacy where request should be sent: [facility] [address] [phone] [place] [phone]      Last office visit with prescribing clinician: 1/26/2024   Last telemedicine visit with prescribing clinician: Visit date not found   Next office visit with prescribing clinician: 7/26/2024     Additional details provided by patient: PATIENT HAS ONE PEN LEFT    Does the patient have less than a 3 day supply:  [x] Yes  [] No    Would you like a call back once the refill request has been completed: [] Yes [x] No    If the office needs to give you a call back, can they leave a voicemail: [] Yes [x] No    Clay Kerns Rep   03/22/24 10:33 EDT       "

## 2024-04-08 RX ORDER — INSULIN LISPRO 100 [IU]/ML
15 INJECTION, SOLUTION INTRAVENOUS; SUBCUTANEOUS
Qty: 9 ML | Refills: 11 | OUTPATIENT
Start: 2024-04-08

## 2024-04-08 NOTE — TELEPHONE ENCOUNTER
"  Caller: Javier Lerner \"Yfn\"    Relationship: Self    Best call back number: [phone]     Requested Prescriptions:   Requested Prescriptions     Pending Prescriptions Disp Refills    Insulin Lispro (HumaLOG) 100 UNIT/ML injection 9 mL 11     Sig: Inject 15 Units under the skin into the appropriate area as directed 3 (Three) Times a Day Before Meals.        Pharmacy where request should be sent: [id number] - [place], [place] - [address]  - [phone]  - [phone] [facility]     Last office visit with prescribing clinician: 1/26/2024   Last telemedicine visit with prescribing clinician: Visit date not found   Next office visit with prescribing clinician: 7/26/2024     Additional details provided by patient: PATIENT ALMOST OUT OF MEDICATION, JUST ENOUGH FOR TODAY.  NEEDS UNTIL MAIL ORDER ARRIVES.      Does the patient have less than a 3 day supply:  [x] Yes  [] No    Would you like a call back once the refill request has been completed: [] Yes [x] No    If the office needs to give you a call back, can they leave a voicemail: [] Yes [x] No    Suzanne Cha   04/08/24 10:43 EDT     "

## 2024-04-22 ENCOUNTER — OFFICE VISIT (OUTPATIENT)
Dept: CARDIOLOGY | Facility: CLINIC | Age: 84
End: 2024-04-22
Payer: MEDICARE

## 2024-04-22 VITALS
BODY MASS INDEX: 39.76 KG/M2 | HEIGHT: 71 IN | HEART RATE: 90 BPM | DIASTOLIC BLOOD PRESSURE: 58 MMHG | WEIGHT: 284 LBS | SYSTOLIC BLOOD PRESSURE: 124 MMHG | OXYGEN SATURATION: 95 %

## 2024-04-22 DIAGNOSIS — G47.33 OSA (OBSTRUCTIVE SLEEP APNEA): ICD-10-CM

## 2024-04-22 DIAGNOSIS — Z95.0 PRESENCE OF CARDIAC PACEMAKER: ICD-10-CM

## 2024-04-22 DIAGNOSIS — I10 PRIMARY HYPERTENSION: ICD-10-CM

## 2024-04-22 DIAGNOSIS — I48.0 PAROXYSMAL ATRIAL FIBRILLATION: ICD-10-CM

## 2024-04-22 DIAGNOSIS — I45.89 CHRONOTROPIC INCOMPETENCE: Primary | ICD-10-CM

## 2024-04-22 PROCEDURE — 93280 PM DEVICE PROGR EVAL DUAL: CPT | Performed by: INTERNAL MEDICINE

## 2024-04-22 PROCEDURE — 99214 OFFICE O/P EST MOD 30 MIN: CPT | Performed by: INTERNAL MEDICINE

## 2024-04-22 PROCEDURE — 3078F DIAST BP <80 MM HG: CPT | Performed by: INTERNAL MEDICINE

## 2024-04-22 PROCEDURE — 3074F SYST BP LT 130 MM HG: CPT | Performed by: INTERNAL MEDICINE

## 2024-04-22 RX ORDER — KETOCONAZOLE 20 MG/ML
SHAMPOO TOPICAL
COMMUNITY
Start: 2024-03-12

## 2024-04-22 NOTE — PROGRESS NOTES
Cardiac Electrophysiology Outpatient Follow Up Note            [facility] at [facility]    Follow Up Office Visit      Javier Lerner  [id number]  04/22/2024  [unfilled]  [unfilled]    Primary Care Physician: Javier Kirkpatrick MD    Referred By: No ref. provider found    Subjective     Chief Complaint:   Diagnoses and all orders for this visit:    1. Chronotropic incompetence (Primary)    2. Presence of cardiac pacemaker    3. Primary hypertension    4. Paroxysmal atrial fibrillation    5. OMAIRA (obstructive sleep apnea)      Chief Complaint   Patient presents with    Severe sinus bradycardia       History of Present Illness:   Javier Lerner is a 83 y.o. male who presents to my electrophysiology clinic for follow up of above complaints.  Doing reasonably well.  Had a thoracentesis couple months back that was complicated by pneumothorax requiring chest tube at an outside facility he has recovered completely since then.      Past Medical History:   Past Medical History:   Diagnosis Date    Arthritis     Bradycardia     Cataract     Cholelithiasis     Chronic fatigue 9/22/2016    CKD (chronic kidney disease) stage 3, GFR 30-59 ml/min     Colon polyp     Diabetes mellitus     DX. 1997, FSBS 1 X DAY    Enlarged prostate     GERD (gastroesophageal reflux disease)     Heartburn     HL (hearing loss)     Hyperlipidemia     Hypertension     Liver disease     Myocardial infarction     Obesity, Class II, BMI 35-39.9, with comorbidity     OMAIRA (obstructive sleep apnea)     Pacemaker     Pancreatitis     HISTORY OF    Pulmonary HTN 10/16/2023    Wears glasses        Past Surgical History:   Past Surgical History:   Procedure Laterality Date    ANKLE SURGERY Right     APPENDECTOMY      CARDIAC ELECTROPHYSIOLOGY PROCEDURE N/A 1/5/2017    Procedure: Pacemaker DC new;  Surgeon: Bryson Leon MD;  Location: [facility] INVASIVE LOCATION;  Service:     CATARACT  EXTRACTION, BILATERAL      CHOLECYSTECTOMY      COLONOSCOPY      HERNIA REPAIR      UMBILICAL     PACEMAKER IMPLANTATION  01/05/2017    PER DR. HODGE       Family History:   Family History   Problem Relation Age of Onset    Heart disease Mother     Diabetes Father     Hypertension Father     Heart disease Father     Sleep apnea Father        Social History:   Social History     Socioeconomic History    Marital status:    Tobacco Use    Smoking status: Former     Current packs/day: 0.00     Average packs/day: 0.5 packs/day for 60.0 years (30.0 ttl pk-yrs)     Types: Cigarettes, Pipe     Start date: 1/4/1954     Quit date: 1/4/2014     Years since quitting: 10.3     Passive exposure: Never    Smokeless tobacco: Never   Vaping Use    Vaping status: Never Used   Substance and Sexual Activity    Alcohol use: Yes     Comment: 3-4 drinks per month occassional    Drug use: Never    Sexual activity: Defer       Medications:     Current Outpatient Medications:     apixaban (Eliquis) 2.5 MG tablet tablet, Take 1 tablet by mouth 2 (Two) Times a Day., Disp: 180 tablet, Rfl: 1    B-D UF III MINI PEN NEEDLES 31G X 5 MM misc, USE THREE TIMES A DAY WITH INSULIN, Disp: 270 each, Rfl: 3    bumetanide (BUMEX) 1 MG tablet, Take 1 tablet by mouth Daily., Disp: 30 tablet, Rfl: 5    cholecalciferol (VITAMIN D3) 25 MCG (1000 UT) tablet, Take 1 tablet by mouth Daily., Disp: , Rfl:     Continuous Blood Gluc  (FreeStyle [place] 14 Day [place]) device, 1 application As Needed (test glucose). Dx: E11.9, Disp: 1 each, Rfl: 24    Continuous Blood Gluc Sensor (FreeStyle [place] 14 Day Sensor) misc, 1 application As Needed (test glucose). Dx: E11.9, Disp: 1 each, Rfl: 24    Diclofenac Sodium (VOLTAREN) 1 % gel gel, Apply 4 g topically to the appropriate area as directed 3 (Three) Times a Day., Disp: , Rfl:     empagliflozin (Jardiance) 10 MG tablet tablet, Take 1 tablet by mouth Daily., Disp: 90 tablet, Rfl: 3    fenofibrate (TRICOR)  145 MG tablet, Take 1 tablet by mouth Daily., Disp: 90 tablet, Rfl: 1    finasteride (PROPECIA) 1 MG tablet, Take 5 tablets by mouth Daily., Disp: , Rfl:     finasteride (Proscar) 5 MG tablet, Take 1 tablet by mouth Daily., Disp: 90 tablet, Rfl: 1    glucose blood test strip, OneTouch Verio test strips  USE TO TEST BLOOD GLUCOSE 3 TIMES DAILY e11.65, Disp: , Rfl:     hydrALAZINE (APRESOLINE) 100 MG tablet, Take 1 tablet by mouth 2 (Two) Times a Day., Disp: 180 tablet, Rfl: 1    Insulin Glargine-yfgn (Semglee, yfgn,) 100 UNIT/ML solution pen-injector, Inject 45 Units under the skin into the appropriate area as directed 2 (Two) Times a Day., Disp: 90 mL, Rfl: 3    Insulin Lispro (HumaLOG) 100 UNIT/ML injection, Inject 15 Units under the skin into the appropriate area as directed 3 (Three) Times a Day Before Meals., Disp: 9 mL, Rfl: 11    ketoconazole (NIZORAL) 2 % shampoo, APPLY TO THE AFFECTED AREA(S), LATHER, LEAVE IN PLACE FOR 5 MINUTES, AND THEN RINSE OFF WITH WATER BY TOPICAL ROUTE every other day, Disp: , Rfl:     meclizine (ANTIVERT) 25 MG tablet, Take 1 tablet by mouth 3 (Three) Times a Day As Needed for Dizziness., Disp: 30 tablet, Rfl: 0    metoprolol tartrate (LOPRESSOR) 50 MG tablet, Take 1 tablet by mouth 2 (Two) Times a Day., Disp: 180 tablet, Rfl: 1    multivitamins-minerals (PRESERVISION AREDS 2) capsule capsule, Take 1 capsule by mouth 2 (Two) Times a Day., Disp: , Rfl:     mupirocin (BACTROBAN) 2 % ointment, APPLY A SMALL AMOUNT TO THE NECK BY TOPICAL ROUTE 2 TIMES PER DAY FOR 14 DAYS, Disp: , Rfl:     nitroglycerin (NITROSTAT) 0.4 MG SL tablet, Place 1 tablet under the tongue Every 5 (Five) Minutes As Needed for Chest Pain. Take no more than 3 doses in 15 minutes., Disp: 100 tablet, Rfl: 1    OneTouch Verio test strip, , Disp: , Rfl:     simethicone (Gas-X) 80 MG chewable tablet, Chew 1 tablet 3 (Three) Times a Day., Disp: 270 tablet, Rfl: 1    simvastatin (ZOCOR) 20 MG tablet, Take 1 tablet by  "mouth Every Night., Disp: 90 tablet, Rfl: 1    triamcinolone (KENALOG) 0.1 % cream, Apply 1 application topically to the appropriate area as directed 2 (Two) Times a Day., Disp: 45 g, Rfl: 3    alfuzosin (UROXATRAL) 10 MG 24 hr tablet, Take 1 tablet by mouth Daily., Disp: , Rfl:     sennosides-docusate (senna-docusate sodium) 8.6-50 MG per tablet, Take 1 tablet by mouth 2 (Two) Times a Day. (Patient not taking: Reported on 4/22/2024), Disp: 180 tablet, Rfl: 1    Allergies:   Allergies   Allergen Reactions    Penicillins Other (See Comments)     WELTS ON HANDS AND TURN PURPLE    Tetracyclines & Related Other (See Comments)     Blisters on skin       Objective   Vital Signs:   Vitals:    04/22/24 1513   BP: 124/58   BP Location: Right arm   Patient Position: Sitting   Pulse: 90   SpO2: 95%   Weight: 129 kg (284 lb)   Height: 180.3 cm (71\")       PHYSICAL EXAM  General appearance: Awake, alert, cooperative  Head: Normocephalic, without obvious abnormality, atraumatic  Eyes: Conjunctivae/corneas clear, EOMs intact  Neck: no adenopathy, no carotid bruit, no JVD, and thyroid: not enlarged  Lungs: clear to auscultation bilaterally and no rhonchi or crackles\", ' symmetric  Heart: regular rate and rhythm, S1, S2 normal, no murmur, click, rub or gallop  Abdomen: Soft, non-tender, bowel sounds normal,  no organomegaly  Extremities: extremities normal, atraumatic, no cyanosis or edema  Skin: Skin color, turgor normal, no rashes or lesions  Neurologic: Grossly normal     Lab Results   Component Value Date    GLUCOSE 117 (H) 12/15/2023    CALCIUM 10.1 12/15/2023     12/15/2023    K 3.4 (L) 12/15/2023    CO2 23.0 12/15/2023    CL 99 12/15/2023    BUN 42 (H) 12/15/2023    CREATININE 2.03 (H) 12/15/2023    EGFRIFNONA 42 (L) 01/04/2017    BCR 20.7 12/15/2023    ANIONGAP 17.0 (H) 12/15/2023     Lab Results   Component Value Date    WBC 11.91 (H) 12/15/2023    HGB 17.5 12/15/2023    HCT 51.4 (H) 12/15/2023    MCV 93.8 12/15/2023 "     12/15/2023     Lab Results   Component Value Date    INR 1.04 01/04/2017    PROTIME 11.3 01/04/2017     Lab Results   Component Value Date    TSH 1.811 09/20/2016       Cardiac Testing:     I personally viewed and interpreted the patient's EKG/Telemetry/lab data    Procedures    Tobacco Cessation: N/A  Obstructive Sleep Apnea Screening: Completed    Advance Care Planning   ACP discussion was declined by the patient. Patient does not have an advance directive, declines further assistance.       Assessment & Plan    Diagnoses and all orders for this visit:    1. Chronotropic incompetence (Primary)    2. Presence of cardiac pacemaker    3. Primary hypertension    4. Paroxysmal atrial fibrillation    5. OMAIRA (obstructive sleep apnea)         Diagnosis Plan   1. Chronotropic incompetence  Biotronik dual-chamber permanent pacemaker in situ.    Chronotropic competency approximated.  Feels well.  No residual symptoms.      2. Presence of cardiac pacemaker  Dual-chamber permanent pacemaker.  Appropriate pacing sensing impedance values.  Scant A-fib.      This patient's Cardiac Implanted Electronic Device was manually interrogated and reprogrammed during the patient encounter today.  Iterative programming changes were manually made to determine the sensing threshold, pacing threshold, lead impedance as well as underlying cardiac rhythm.  These programming changes were not limited to but included some or all of the following when appropriate: pacing mode, programmed AV delays, blanking periods, and refractory periods.  Data obtained as a result of these manual programing changes informed the patient's CIED permanent programming.        3. Primary hypertension  Blood pressure well-controlled.      4. Paroxysmal atrial fibrillation  Anticoagulated for the primary [facility] stroke.      5. OMAIRA (obstructive sleep apnea)  CPAP compliant.        Body mass index is 39.61 kg/m².    I spent 35 minutes in consultation with  this patient which included more than 65% of this time in direct face-to-face counseling, physical examination and discussion of my assessment and findings and this shared decision making with the patient.  The remainder of the time not spent face-to-face was performing one, some or all of the following actions: preparing to see the patient (e.g. reviewing tests, prior clinicians' notes, etc), ordering medications, tests or procedures, coordination of care, discussion of the plan with other healthcare providers, documenting clinical information in epic as well as independently interpreting results and communication of these results to the patient family and/or caregiver(s).  Please note that this explicitly excludes time spent on other separate billable services such as performing procedures or test interpretation, when applicable.      Follow Up:       Thank you for allowing me to participate in the care of your patient. Please to not hesitate to contact me with additional questions or concerns.      Ish Sewell DO, FACC, RS  Cardiac Electrophysiologist  [facility] / [facility]

## 2024-05-10 ENCOUNTER — OFFICE VISIT (OUTPATIENT)
Dept: CARDIOLOGY | Facility: CLINIC | Age: 84
End: 2024-05-10
Payer: MEDICARE

## 2024-05-10 VITALS
DIASTOLIC BLOOD PRESSURE: 72 MMHG | BODY MASS INDEX: 41.1 KG/M2 | OXYGEN SATURATION: 96 % | HEART RATE: 70 BPM | WEIGHT: 293.6 LBS | HEIGHT: 71 IN | SYSTOLIC BLOOD PRESSURE: 142 MMHG

## 2024-05-10 DIAGNOSIS — I50.32 CHRONIC HEART FAILURE WITH PRESERVED EJECTION FRACTION (HFPEF): Primary | ICD-10-CM

## 2024-05-10 DIAGNOSIS — Z79.4 TYPE 2 DIABETES MELLITUS WITHOUT COMPLICATION, WITH LONG-TERM CURRENT USE OF INSULIN: ICD-10-CM

## 2024-05-10 DIAGNOSIS — I10 PRIMARY HYPERTENSION: ICD-10-CM

## 2024-05-10 DIAGNOSIS — I48.0 PAROXYSMAL ATRIAL FIBRILLATION: ICD-10-CM

## 2024-05-10 DIAGNOSIS — E11.9 TYPE 2 DIABETES MELLITUS WITHOUT COMPLICATION, WITH LONG-TERM CURRENT USE OF INSULIN: ICD-10-CM

## 2024-05-10 DIAGNOSIS — E78.2 MIXED HYPERLIPIDEMIA: ICD-10-CM

## 2024-06-13 RX ORDER — FENOFIBRATE 145 MG/1
145 TABLET, COATED ORAL DAILY
Qty: 90 TABLET | Refills: 3 | Status: SHIPPED | OUTPATIENT
Start: 2024-06-13

## 2024-06-14 RX ORDER — FINASTERIDE 5 MG/1
5 TABLET, FILM COATED ORAL DAILY
Qty: 90 TABLET | Refills: 3 | Status: SHIPPED | OUTPATIENT
Start: 2024-06-14

## 2024-06-19 RX ORDER — METOPROLOL TARTRATE 50 MG/1
50 TABLET, FILM COATED ORAL 2 TIMES DAILY
Qty: 180 TABLET | Refills: 3 | Status: SHIPPED | OUTPATIENT
Start: 2024-06-19

## 2024-06-26 RX ORDER — APIXABAN 2.5 MG/1
2.5 TABLET, FILM COATED ORAL 2 TIMES DAILY
Qty: 180 TABLET | Refills: 3 | Status: SHIPPED | OUTPATIENT
Start: 2024-06-26

## 2024-07-26 ENCOUNTER — OFFICE VISIT (OUTPATIENT)
Dept: FAMILY MEDICINE CLINIC | Facility: CLINIC | Age: 84
End: 2024-07-26
Payer: MEDICARE

## 2024-07-26 VITALS
SYSTOLIC BLOOD PRESSURE: 122 MMHG | OXYGEN SATURATION: 95 % | WEIGHT: 291 LBS | BODY MASS INDEX: 40.74 KG/M2 | HEART RATE: 83 BPM | HEIGHT: 71 IN | DIASTOLIC BLOOD PRESSURE: 52 MMHG

## 2024-07-26 DIAGNOSIS — E11.65 UNCONTROLLED DIABETES MELLITUS WITH HYPERGLYCEMIA, WITH LONG-TERM CURRENT USE OF INSULIN: Primary | ICD-10-CM

## 2024-07-26 DIAGNOSIS — I48.0 PAROXYSMAL ATRIAL FIBRILLATION: ICD-10-CM

## 2024-07-26 DIAGNOSIS — I10 ESSENTIAL HYPERTENSION: ICD-10-CM

## 2024-07-26 DIAGNOSIS — Z79.4 UNCONTROLLED DIABETES MELLITUS WITH HYPERGLYCEMIA, WITH LONG-TERM CURRENT USE OF INSULIN: Primary | ICD-10-CM

## 2024-07-26 DIAGNOSIS — N18.31 STAGE 3A CHRONIC KIDNEY DISEASE: ICD-10-CM

## 2024-07-26 LAB
EXPIRATION DATE: ABNORMAL
HBA1C MFR BLD: 7.9 % (ref 4.5–5.7)
Lab: ABNORMAL

## 2024-07-27 NOTE — PROGRESS NOTES
.Subjective   Javier Lerner is a 83 y.o. male    Chief Complaint    Hypertension  Obesity  Diabetes mellitus  Atrial fibrillation  Chronic kidney disease  Decreased mobility      History of Present Illness  The patient is an 83-year-old male who is here for follow-up visit related to diabetes, hypertension, obesity, paroxysmal atrial fibrillation and stage 3a chronic kidney disease.    The patient expresses a desire to mow his yard, however, he experiences instability when attempting to utilize a lawnmower. His son assists him with this activity. His blood glucose levels have been fluctuating, with readings consistently in the 200s. This morning, his glucose level was 178. He utilizes a [employer] 2 glucose monitor. Two years ago, a wart was treated with cryotherapy by a dermatologist. However, the wart reappeared 60 days post-surgery, necessitating a second procedure. Despite the application of 4 to 5 different creams, the wart has not fully healed. The wart occasionally oozes a clear, yellow hue    Patient concerned that his activity level is rapidly deteriorating as he is becoming progressively unable to do his usual outside tasks.  He is also having difficulty with inside tasks including bathing.  He is asking about assistance if these things become worsened.  His wife has experienced this to some degree also but less pronounced but she is unable to help him partially due to his excessive weight.      The following portions of the patient's history were reviewed and updated as appropriate: allergies, current medications, past social history and problem list    Review of Systems   Constitutional:  Positive for fatigue. Negative for appetite change, chills, diaphoresis, fever and unexpected weight change.   Eyes:  Negative for visual disturbance.   Respiratory:  Negative for cough, chest tightness, shortness of breath and wheezing.    Cardiovascular:  Positive for leg swelling. Negative for chest pain and  palpitations.   Gastrointestinal:  Negative for abdominal pain, diarrhea, nausea and vomiting.   Endocrine: Negative for polydipsia, polyphagia and polyuria.   Genitourinary:  Negative for dysuria, frequency and urgency.   Musculoskeletal:  Negative for arthralgias, back pain and myalgias.   Skin:  Positive for wound. Negative for color change and rash.   Neurological:  Negative for dizziness, syncope, weakness, light-headedness, numbness and headaches.   Hematological:  Negative for adenopathy. Does not bruise/bleed easily.     Objective     Vitals:    07/26/24 1329   BP: 122/52   Pulse: 83   SpO2: 95%       Physical Exam  Vitals and nursing note reviewed.   Constitutional:       General: He is not in acute distress.     Appearance: Normal appearance. He is well-developed. He is obese. He is not ill-appearing, toxic-appearing or diaphoretic.   HENT:      Head: Normocephalic and atraumatic.   Eyes:      Conjunctiva/sclera: Conjunctivae normal.      Pupils: Pupils are equal, round, and reactive to light.   Neck:      Thyroid: No thyromegaly.      Vascular: No carotid bruit or JVD.   Cardiovascular:      Rate and Rhythm: Normal rate and regular rhythm.      Pulses: Normal pulses.      Heart sounds: Normal heart sounds. No murmur heard.  Pulmonary:      Effort: Pulmonary effort is normal. No respiratory distress.      Breath sounds: Normal breath sounds.   Abdominal:      General: Bowel sounds are normal.      Palpations: Abdomen is soft. There is no mass.      Tenderness: There is no abdominal tenderness.   Musculoskeletal:      Cervical back: Neck supple.   Lymphadenopathy:      Cervical: No cervical adenopathy.   Skin:     General: Skin is warm and dry.      Findings: No rash.   Neurological:      Mental Status: He is alert and oriented to person, place, and time.      Sensory: No sensory deficit.   Psychiatric:         Mood and Affect: Mood normal.         Behavior: Behavior normal.     Physical  Exam      Assessment & Plan   Assessment & Plan  1. Diabetes.    Problems Addressed this Visit          Cardiac and Vasculature    Paroxysmal atrial fibrillation       Endocrine and Metabolic    Uncontrolled diabetes mellitus with hyperglycemia, with long-term current use of insulin - Primary    Relevant Orders    POC Glycosylated Hemoglobin (Hb A1C) (Completed)       Genitourinary and Reproductive     CKD (chronic kidney disease) stage 3, GFR 30-59 ml/min     Other Visit Diagnoses       Essential hypertension              Diagnoses         Codes Comments    Uncontrolled diabetes mellitus with hyperglycemia, with long-term current use of insulin    -  Primary ICD-10-CM: E11.65, Z79.4  ICD-9-CM: 250.02, V58.67     Essential hypertension     ICD-10-CM: I10  ICD-9-CM: 401.9     Paroxysmal atrial fibrillation     ICD-10-CM: I48.0  ICD-9-CM: 427.31     Stage 3a chronic kidney disease     ICD-10-CM: N18.31  ICD-9-CM: 585.3           Plan    Continue current medications.  No changes.      Encourage patient to remain as active as possible.  Consider home health or in-home assistant if symptoms continue to deteriorate    I spent 40 minutes in patient care: Reviewing records prior to the visit, examining the patient, entering orders and documentation    Part of this note may be an electronic transcription/translation of spoken language to printed text using the Dragon Dictation System.

## 2024-08-08 NOTE — PROGRESS NOTES
Sleep Clinic Follow Up Note    Chief Complaint  Sleep Apnea, Follow-up, and Dry Mouth    Subjective     History of Present Illness (from previous encounter on 8/15/2023):  Javier Lerner is a 82 y.o. male who returns for follow-up compliance of PAP therapy.  Pap report has been reviewed.  Overall usage and compliance are at 100%.  Patient averages 10 hours 16 minutes.  AHI is at 8.6/H.  (Original sleep study on 6/13/2021 noted an AHI of 71.8/H).  He does have a large leak at 89 L/min and I have recommended possible mask change.  He has tried several masks but continues to have difficulty. I will refill the patient's supplies, and they will return for follow-up and compliance in 1 year or sooner should they have further questions or concerns.      We have discussed his increased dependent edema.  The patient reports that he will occasionally take 1-1/2 pills of Lasix to help with this.  I have asked him to discuss this further with primary care. His weight fluctuates and I suspect this is at least in part related to fluid retention. (End copied text).    Interval History:  Javier Lerner is a 83 y.o. male returns for follow up and compliance of PAP therapy. The patient was last seen on 8/15/2023 by me. Overall the patient feels good with regard to therapy. The device appears to be working appropriately. On average the patient sleeps 7-8 hours per night. The patient wakes 1-2 times per night.     The patient reports the following changes to their medical and medication history since they were last seen:  Knee pain    Further details are as follows:      Newark Scale is (out of 24): Total score: 5     Weight:  Current Weight: 290 lb    Weight change in the last year:  gain: 0 lbs    The patient's relevant past medical, surgical, family, and social history reviewed and updated in Epic as appropriate.    PMH:    Past Medical History:   Diagnosis Date    Arthritis     Bradycardia     Cataract     Cholelithiasis      Chronic fatigue 9/22/2016    CKD (chronic kidney disease) stage 3, GFR 30-59 ml/min     Colon polyp     Diabetes mellitus     DX. 1997, FSBS 1 X DAY    Enlarged prostate     GERD (gastroesophageal reflux disease)     Heartburn     HL (hearing loss)     Hyperlipidemia     Hypertension     Liver disease     Myocardial infarction     Obesity, Class II, BMI 35-39.9, with comorbidity     OMAIRA (obstructive sleep apnea)     Pacemaker     Pancreatitis     HISTORY OF    Pulmonary HTN 10/16/2023    Wears glasses      Past Surgical History:   Procedure Laterality Date    ANKLE SURGERY Right     APPENDECTOMY      CARDIAC ELECTROPHYSIOLOGY PROCEDURE N/A 1/5/2017    Procedure: Pacemaker DC new;  Surgeon: Bryson Leon MD;  Location: [facility];  Service:     CATARACT EXTRACTION, BILATERAL      CHOLECYSTECTOMY      COLONOSCOPY      HERNIA REPAIR      UMBILICAL     PACEMAKER IMPLANTATION  01/05/2017    PER DR. LEON       Allergies   Allergen Reactions    Penicillins Other (See Comments)     WELTS ON HANDS AND TURN PURPLE    Tetracyclines & Related Other (See Comments)     Blisters on skin       MEDS:  Prior to Admission medications    Medication Sig Start Date End Date Taking? Authorizing Provider   B-D UF III MINI PEN NEEDLES 31G X 5 MM misc USE THREE TIMES A DAY WITH INSULIN 11/2/23   Steve Monet MD   bumetanide (BUMEX) 1 MG tablet Take 1 tablet by mouth Daily. 3/14/24   Javier Kirkpatrick MD   cholecalciferol (VITAMIN D3) 25 MCG (1000 UT) tablet Take 1 tablet by mouth Daily. 5/16/21   ProviderLev MD   Continuous Blood Gluc  (FreeStyle [place] 14 Day [place]) device 1 application As Needed (test glucose). Dx: E11.9 1/20/22   Javier Kirkpatrick MD   Continuous Blood Gluc Sensor (FreeStyle Hernan 14 Day Sensor) misc 1 application As Needed (test glucose). Dx: E11.9 1/20/22   Javier Kirkpatrick MD   Diclofenac Sodium (VOLTAREN) 1 % gel gel Apply 4 g  topically to the appropriate area as directed 3 (Three) Times a Day. 12/18/23   Lev Mcgraw MD   Eliquis 2.5 MG tablet tablet TAKE 1 TABLET TWICE A DAY 6/26/24   Javier Kirkpatrick MD   empagliflozin (Jardiance) 10 MG tablet tablet Take 1 tablet by mouth Daily. 11/2/23   Prashant King MD   fenofibrate (TRICOR) 145 MG tablet TAKE 1 TABLET DAILY 6/13/24   Javier Kirkpatrick MD   finasteride (PROPECIA) 1 MG tablet Take 5 tablets by mouth Daily. 12/15/23   Lev Mcgraw MD   finasteride (PROSCAR) 5 MG tablet TAKE 1 TABLET DAILY 6/14/24   Javier Kirkpatrick MD   glucose blood test strip OneTouch Verio test strips   USE TO TEST BLOOD GLUCOSE 3 TIMES DAILY e11.65    Lev Mcgraw MD   hydrALAZINE (APRESOLINE) 100 MG tablet Take 1 tablet by mouth 2 (Two) Times a Day. 1/8/24   Javier Kirkpatrick MD   Insulin Glargine-yfgn (Semglee, yfgn,) 100 UNIT/ML solution pen-injector Inject 45 Units under the skin into the appropriate area as directed 2 (Two) Times a Day. 1/26/24   Javier Kirkpatrick MD   Insulin Lispro (HumaLOG) 100 UNIT/ML injection Inject 15 Units under the skin into the appropriate area as directed 3 (Three) Times a Day Before Meals. 4/8/24   Javier Kirkpatrick MD   ketoconazole (NIZORAL) 2 % shampoo APPLY TO THE AFFECTED AREA(S), LATHER, LEAVE IN PLACE FOR 5 MINUTES, AND THEN RINSE OFF WITH WATER BY TOPICAL ROUTE every other day 3/12/24   Lev Mcgraw MD   meclizine (ANTIVERT) 25 MG tablet Take 1 tablet by mouth 3 (Three) Times a Day As Needed for Dizziness. 2/8/24   Javier Kirkpatrick MD   metoprolol tartrate (LOPRESSOR) 50 MG tablet TAKE 1 TABLET TWICE A DAY 6/19/24   Javier Kirkpatrick MD   multivitamins-minerals (PRESERVISION AREDS 2) capsule capsule Take 1 capsule by mouth 2 (Two) Times a Day.    Provider, MD Lev   mupirocin (BACTROBAN) 2 % ointment APPLY A SMALL AMOUNT TO THE NECK BY TOPICAL  "ROUTE 2 TIMES PER DAY FOR 14 DAYS 3/12/24   Provider, MD Lev   nitroglycerin (NITROSTAT) 0.4 MG SL tablet Place 1 tablet under the tongue Every 5 (Five) Minutes As Needed for Chest Pain. Take no more than 3 doses in 15 minutes. 6/23/21   Prashant King MD   OneTouch Verio test strip  9/21/20   ProviderLev MD   sennosides-docusate (senna-docusate sodium) 8.6-50 MG per tablet Take 1 tablet by mouth 2 (Two) Times a Day. 1/8/24   Javier Kirkpatrick MD   simethicone (Gas-X) 80 MG chewable tablet Chew 1 tablet 3 (Three) Times a Day. 1/9/24   Javier Kirkpatrick MD   simvastatin (ZOCOR) 20 MG tablet Take 1 tablet by mouth Every Night. 1/8/24   Javier Kirkpatrick MD   triamcinolone (KENALOG) 0.1 % cream Apply 1 application topically to the appropriate area as directed 2 (Two) Times a Day. 6/8/23   Javier Kirkpatrick MD         FH:  Family History   Problem Relation Age of Onset    Heart disease Mother     Diabetes Father     Hypertension Father     Heart disease Father     Sleep apnea Father        Objective   Vital Signs:  /60   Pulse 76   Temp 97.1 °F (36.2 °C) (Temporal)   Ht 180.3 cm (70.98\")   Wt 132 kg (290 lb)   SpO2 95%   BMI 40.47 kg/m²              Physical Exam  Vitals reviewed.   Constitutional:       Appearance: Normal appearance.   HENT:      Head: Normocephalic and atraumatic.      Nose: Nose normal.      Mouth/Throat:      Mouth: Mucous membranes are moist.   Cardiovascular:      Rate and Rhythm: Normal rate and regular rhythm.      Heart sounds: No murmur heard.     No friction rub. No gallop.   Pulmonary:      Effort: Pulmonary effort is normal. No respiratory distress.      Breath sounds: Normal breath sounds. No wheezing or rhonchi.   Neurological:      Mental Status: He is alert and oriented to person, place, and time.   Psychiatric:         Behavior: Behavior normal.             Result Review :           PAP Report:  AHI: 6.8/h  Days of " Usage: 90/90 (100%)  Number of Days Greater than 4 hours: 90/90 (100%)  Average time (days used): 9 hours 24-minute  95th Percentile Pressure: 17.7 cmH2O  95th percentile leaks: 27.5 L/min  Settings: Auto CPAP-10/18 cm H2O, EPR full-time, EPR level 1, response standard.       Assessment and Plan  Javier Lerner is a 83 y.o. male who returns for follow-up and compliance of PAP therapy.  The Pap report has been reviewed.  Overall usage is at 100% with compliance also at 100%.  Patient averages 9 hours and 24 minutes of therapy.  Patient has a history of severe obstructive sleep apnea with an initial AHI of 71.8/H.  AHI is now at 6.8/h.  Patient reports having some difficulty with low pressure at times.  I will increase his upper limit to 20 cm H2O to see if this is helpful.  He is going to contact me and let me know how he does with this I will refill the patient's supplies, and I have asked them to return for follow-up and compliance in 1 year or sooner should they have further questions or concerns.  Patient is in a wheelchair.  He has difficulty walking making exercise difficult.    Diagnoses and all orders for this visit:    1. Obstructive sleep apnea, adult (Primary)  -     PAP Therapy    2. Morbid (severe) obesity due to excess calories         The patient continues to use and benefit from PAP therapy.    1. The patient was counseled regarding multimodal approach with healthy nutrition, healthy sleep, regular physical activity, social activities, counseling, and medications. Encouraged to practice lateral sleep position. Avoid alcohol and sedatives close to bedtime.     2.  We will refill supplies x1 year.  Return to clinic 1 year or sooner if symptoms warrant. I have reviewed the results of my evaluation and impression and discussed my recommendations in detail with the patient.           Follow Up  Return in about 1 year (around 8/13/2025) for Annual visit.  Patient was given instructions and counseling  regarding his condition or for health maintenance advice. Please see specific information pulled into the AVS if appropriate.       BOB Gillette, ACNP-BC  Pulmonology, Critical Care, and Sleep Medicine

## 2024-08-09 RX ORDER — HYDRALAZINE HYDROCHLORIDE 100 MG/1
100 TABLET, FILM COATED ORAL 2 TIMES DAILY
Qty: 180 TABLET | Refills: 3 | Status: SHIPPED | OUTPATIENT
Start: 2024-08-09

## 2024-08-13 ENCOUNTER — OFFICE VISIT (OUTPATIENT)
Dept: SLEEP MEDICINE | Age: 84
End: 2024-08-13
Payer: MEDICARE

## 2024-08-13 VITALS
TEMPERATURE: 97.1 F | DIASTOLIC BLOOD PRESSURE: 60 MMHG | OXYGEN SATURATION: 95 % | HEART RATE: 76 BPM | HEIGHT: 71 IN | WEIGHT: 290 LBS | SYSTOLIC BLOOD PRESSURE: 150 MMHG | BODY MASS INDEX: 40.6 KG/M2

## 2024-08-13 DIAGNOSIS — E66.01 MORBID (SEVERE) OBESITY DUE TO EXCESS CALORIES: ICD-10-CM

## 2024-08-13 DIAGNOSIS — G47.33 OBSTRUCTIVE SLEEP APNEA, ADULT: Primary | ICD-10-CM

## 2024-08-13 PROCEDURE — 3078F DIAST BP <80 MM HG: CPT | Performed by: NURSE PRACTITIONER

## 2024-08-13 PROCEDURE — 99213 OFFICE O/P EST LOW 20 MIN: CPT | Performed by: NURSE PRACTITIONER

## 2024-08-13 PROCEDURE — 3077F SYST BP >= 140 MM HG: CPT | Performed by: NURSE PRACTITIONER

## 2024-08-13 PROCEDURE — 1160F RVW MEDS BY RX/DR IN RCRD: CPT | Performed by: NURSE PRACTITIONER

## 2024-08-13 PROCEDURE — 1159F MED LIST DOCD IN RCRD: CPT | Performed by: NURSE PRACTITIONER

## 2024-08-19 RX ORDER — SIMVASTATIN 20 MG
20 TABLET ORAL NIGHTLY
Qty: 90 TABLET | Refills: 3 | Status: SHIPPED | OUTPATIENT
Start: 2024-08-19

## 2024-09-06 RX ORDER — BUMETANIDE 1 MG/1
1 TABLET ORAL DAILY
Qty: 90 TABLET | Refills: 3 | Status: SHIPPED | OUTPATIENT
Start: 2024-09-06

## 2024-09-30 DIAGNOSIS — Z79.4 DIABETES MELLITUS WITH INSULIN THERAPY: ICD-10-CM

## 2024-09-30 DIAGNOSIS — E11.9 DIABETES MELLITUS WITH INSULIN THERAPY: ICD-10-CM

## 2024-09-30 RX ORDER — FLURBIPROFEN SODIUM 0.3 MG/ML
SOLUTION/ DROPS OPHTHALMIC
Qty: 270 EACH | Refills: 3 | Status: SHIPPED | OUTPATIENT
Start: 2024-09-30

## 2024-09-30 RX ORDER — HYDRALAZINE HYDROCHLORIDE 100 MG/1
100 TABLET, FILM COATED ORAL 2 TIMES DAILY
Qty: 180 TABLET | Refills: 1 | Status: SHIPPED | OUTPATIENT
Start: 2024-09-30

## 2024-09-30 NOTE — TELEPHONE ENCOUNTER
"  Caller: Javier Lerner \"Yfn\"    Relationship: Self    Best call back number: [phone]     Requested Prescriptions:   Requested Prescriptions     Pending Prescriptions Disp Refills    hydrALAZINE (APRESOLINE) 100 MG tablet 180 tablet 3     Sig: Take 1 tablet by mouth 2 (Two) Times a Day.        Pharmacy where request should be sent: [facility] [address] [phone] [facility] [phone]      Last office visit with prescribing clinician: 7/26/2024   Last telemedicine visit with prescribing clinician: Visit date not found   Next office visit with prescribing clinician: Visit date not found         Does the patient have less than a 3 day supply:  [] Yes  [x] No    Would you like a call back once the refill request has been completed: [] Yes [x] No    If the office needs to give you a call back, can they leave a voicemail: [] Yes [x] No    Clay Christine   09/30/24 11:59 EDT       "

## 2024-09-30 NOTE — TELEPHONE ENCOUNTER
"    Caller: Javier Lerner \"Yfn\"    Relationship: Self    Best call back number: [phone]     Requested Prescriptions:   Requested Prescriptions     Pending Prescriptions Disp Refills    Insulin Pen Needle (B-D UF III MINI PEN NEEDLES) 31G X 5 MM misc 270 each 3        Pharmacy where request should be sent: [facility] [address] [phone] [facility] [phone] [facility]     Last office visit with prescribing clinician: 7/26/2024   Last telemedicine visit with prescribing clinician: Visit date not found   Next office visit with prescribing clinician: Visit date not found     Additional details provided by patient:     Does the patient have less than a 3 day supply:  [] Yes  [x] No    Would you like a call back once the refill request has been completed: [] Yes [x] No    If the office needs to give you a call back, can they leave a voicemail: [] Yes [x] No    Clay Torrez   09/30/24 13:05 EDT         "

## 2024-10-01 DIAGNOSIS — E11.9 DIABETES MELLITUS WITH INSULIN THERAPY: ICD-10-CM

## 2024-10-01 DIAGNOSIS — Z79.4 DIABETES MELLITUS WITH INSULIN THERAPY: ICD-10-CM

## 2024-10-01 RX ORDER — FLURBIPROFEN SODIUM 0.3 MG/ML
SOLUTION/ DROPS OPHTHALMIC
Qty: 270 EACH | Refills: 3 | OUTPATIENT
Start: 2024-10-01

## 2024-10-03 NOTE — TELEPHONE ENCOUNTER
PER PT HE RECVD AN EMAIL TODAY FROM [employer] STATING A PA IS NEED FOR THESE. PLEASE GET THIS ASAP BECAUSE THE PATIENT IS VERY LOW ON THEM

## 2024-10-07 ENCOUNTER — OFFICE VISIT (OUTPATIENT)
Age: 84
End: 2024-10-07
Payer: MEDICARE

## 2024-10-07 VITALS
OXYGEN SATURATION: 98 % | WEIGHT: 290 LBS | SYSTOLIC BLOOD PRESSURE: 144 MMHG | HEART RATE: 75 BPM | DIASTOLIC BLOOD PRESSURE: 70 MMHG | BODY MASS INDEX: 40.6 KG/M2 | HEIGHT: 71 IN

## 2024-10-07 DIAGNOSIS — E11.65 UNCONTROLLED DIABETES MELLITUS WITH HYPERGLYCEMIA, WITH LONG-TERM CURRENT USE OF INSULIN: Primary | ICD-10-CM

## 2024-10-07 DIAGNOSIS — Z79.4 UNCONTROLLED DIABETES MELLITUS WITH HYPERGLYCEMIA, WITH LONG-TERM CURRENT USE OF INSULIN: Primary | ICD-10-CM

## 2024-10-07 PROCEDURE — 3078F DIAST BP <80 MM HG: CPT | Performed by: INTERNAL MEDICINE

## 2024-10-07 PROCEDURE — 1159F MED LIST DOCD IN RCRD: CPT | Performed by: INTERNAL MEDICINE

## 2024-10-07 PROCEDURE — 99213 OFFICE O/P EST LOW 20 MIN: CPT | Performed by: INTERNAL MEDICINE

## 2024-10-07 PROCEDURE — 3077F SYST BP >= 140 MM HG: CPT | Performed by: INTERNAL MEDICINE

## 2024-10-07 PROCEDURE — 1160F RVW MEDS BY RX/DR IN RCRD: CPT | Performed by: INTERNAL MEDICINE

## 2024-10-07 NOTE — PROGRESS NOTES
"     Office Note      Date: 10/07/2024  Patient Name: Javier Lerner  MRN: [id number]  : 1940    Chief Complaint   Patient presents with    Diabetes       History of Present Illness:   Javier Lerner is a 83 y.o. male who presents for Diabetes type 2.   Current RX multiple daily insulin doses + jardiance.  He adjusts his insulin     Bg checks are done: with cgm - average is about 168 for the last 90 days   Hypoglycemia :he feels he has that occasionally. He feels nervous about lows anytime it gets below 100. The device documented 1 reading under 70 in the last 30 days       Last A1c:  Hemoglobin A1C   Date Value Ref Range Status   2024 7.9 (A) 4.5 - 5.7 % Final   2023 8.10 (H) 4.80 - 5.60 % Final       Changes in health since last visit: none . Last eye exam up to date.    Subjective              Review of Systems:   Review of Systems   Neurological:  Positive for numbness.       The following portions of the patient's history were reviewed and updated as appropriate: allergies, current medications, past family history, past medical history, past social history, past surgical history, and problem list.    Objective     Visit Vitals  /70 (BP Location: Right arm, Patient Position: Sitting, Cuff Size: Adult)   Pulse 75   Ht 180.3 cm (71\")   Wt 132 kg (290 lb)   SpO2 98%   BMI 40.45 kg/m²           Physical Exam:  Physical Exam  Vitals reviewed.   Constitutional:       Appearance: Normal appearance.   Neurological:      Mental Status: He is alert.   Psychiatric:         Mood and Affect: Mood normal.         Behavior: Behavior normal.         Thought Content: Thought content normal.         Judgment: Judgment normal.          Assessment / Plan      Assessment & Plan:  Problem List Items Addressed This Visit       Uncontrolled diabetes mellitus with hyperglycemia, with long-term current use of insulin - Primary    Overview               Current Assessment & Plan     Stable  No changes " are needed. He has pretty bad carpal tunnel and might need surgery. He will think about it         Relevant Medications    OneTouch Verio test strip    empagliflozin (Jardiance) 10 MG tablet tablet    Insulin Glargine-yfgn (Semglee, yfgn,) 100 UNIT/ML solution pen-injector    Insulin Lispro (HumaLOG) 100 UNIT/ML injection    Insulin Pen Needle (B-D UF III MINI PEN NEEDLES) 31G X 5 MM misc         Electronically signed by : Steve Monet MD  10/07/2024

## 2024-10-07 NOTE — ASSESSMENT & PLAN NOTE
Stable  No changes are needed. He has pretty bad carpal tunnel and might need surgery. He will think about it

## 2024-10-08 DIAGNOSIS — I10 PRIMARY HYPERTENSION: Primary | ICD-10-CM

## 2024-10-08 DIAGNOSIS — E78.2 MIXED HYPERLIPIDEMIA: ICD-10-CM

## 2024-10-08 RX ORDER — EMPAGLIFLOZIN 10 MG/1
10 TABLET, FILM COATED ORAL DAILY
Qty: 90 TABLET | Refills: 0 | Status: SHIPPED | OUTPATIENT
Start: 2024-10-08

## 2024-10-08 NOTE — TELEPHONE ENCOUNTER
LM on vm that he will need labs - orders placed for CMP and Lipid- last creatinine was elevated.     Lab Results   Component Value Date    GLUCOSE 117 (H) 12/15/2023    BUN 42 (H) 12/15/2023    CREATININE 2.03 (H) 12/15/2023     12/15/2023    K 3.4 (L) 12/15/2023    CL 99 12/15/2023    CALCIUM 10.1 12/15/2023    PROTEINTOT 7.2 12/15/2023    ALBUMIN 4.2 12/15/2023    ALT 23 12/15/2023    AST 21 12/15/2023    ALKPHOS 59 12/15/2023    BILITOT 1.0 12/15/2023    GLOB 3.0 12/15/2023    AGRATIO 1.4 12/15/2023    BCR 20.7 12/15/2023    ANIONGAP 17.0 (H) 12/15/2023    EGFR 32.1 (L) 12/15/2023

## 2024-10-17 ENCOUNTER — LAB (OUTPATIENT)
Facility: HOSPITAL | Age: 84
End: 2024-10-17
Payer: MEDICARE

## 2024-10-17 DIAGNOSIS — I10 PRIMARY HYPERTENSION: ICD-10-CM

## 2024-10-17 DIAGNOSIS — E78.2 MIXED HYPERLIPIDEMIA: ICD-10-CM

## 2024-10-17 LAB
ALBUMIN SERPL-MCNC: 3.9 G/DL (ref 3.5–5.2)
ALBUMIN/GLOB SERPL: 1.3 G/DL
ALP SERPL-CCNC: 55 U/L (ref 39–117)
ALT SERPL W P-5'-P-CCNC: 12 U/L (ref 1–41)
ANION GAP SERPL CALCULATED.3IONS-SCNC: 14.2 MMOL/L (ref 5–15)
AST SERPL-CCNC: 15 U/L (ref 1–40)
BILIRUB SERPL-MCNC: 0.8 MG/DL (ref 0–1.2)
BUN SERPL-MCNC: 32 MG/DL (ref 8–23)
BUN/CREAT SERPL: 20.4 (ref 7–25)
CALCIUM SPEC-SCNC: 9.4 MG/DL (ref 8.6–10.5)
CHLORIDE SERPL-SCNC: 102 MMOL/L (ref 98–107)
CHOLEST SERPL-MCNC: 143 MG/DL (ref 0–200)
CO2 SERPL-SCNC: 24.8 MMOL/L (ref 22–29)
CREAT SERPL-MCNC: 1.57 MG/DL (ref 0.76–1.27)
EGFRCR SERPLBLD CKD-EPI 2021: 43.5 ML/MIN/1.73
GLOBULIN UR ELPH-MCNC: 3 GM/DL
GLUCOSE SERPL-MCNC: 188 MG/DL (ref 65–99)
HDLC SERPL-MCNC: 30 MG/DL (ref 40–60)
LDLC SERPL CALC-MCNC: 73 MG/DL (ref 0–100)
LDLC/HDLC SERPL: 2.13 {RATIO}
POTASSIUM SERPL-SCNC: 3.5 MMOL/L (ref 3.5–5.2)
PROT SERPL-MCNC: 6.9 G/DL (ref 6–8.5)
SODIUM SERPL-SCNC: 141 MMOL/L (ref 136–145)
TRIGL SERPL-MCNC: 245 MG/DL (ref 0–150)
VLDLC SERPL-MCNC: 40 MG/DL (ref 5–40)

## 2024-10-17 PROCEDURE — 80053 COMPREHEN METABOLIC PANEL: CPT

## 2024-10-17 PROCEDURE — 80061 LIPID PANEL: CPT

## 2024-10-17 PROCEDURE — 36415 COLL VENOUS BLD VENIPUNCTURE: CPT

## 2024-10-22 ENCOUNTER — TELEPHONE (OUTPATIENT)
Dept: CARDIOLOGY | Facility: CLINIC | Age: 84
End: 2024-10-22
Payer: MEDICARE

## 2024-10-22 NOTE — TELEPHONE ENCOUNTER
----- Message from Prashant King sent at 10/22/2024 10:34 AM EDT -----  Labs reviewed  GFR better 1.5 from 2.0  Cholesterol acceptable    Ask if he used his boat this year :), he offered it to me as it sits in his garage  ----- Message -----  From: Lab, Background User  Sent: 10/17/2024   2:46 PM EDT  To: Prashant King MD    Called pt with the above results, verbalized understanding .

## 2024-11-19 ENCOUNTER — TELEPHONE (OUTPATIENT)
Dept: FAMILY MEDICINE CLINIC | Facility: CLINIC | Age: 84
End: 2024-11-19

## 2024-11-19 NOTE — TELEPHONE ENCOUNTER
Pt notified he would need to call  medical records for this- he said he spoke to Zoila already from medical records and he's waiting on someone to call him back.

## 2024-11-19 NOTE — TELEPHONE ENCOUNTER
"Caller: Javier Lerner \"Yfn\"    Relationship: Self    Best call back number:      [phone] (Mobile)     What form or medical record are you requesting:     PATIENT STATED [facility] REQUIRES ADDITIONAL INFORMATION FROM DR SHAHID REGARDING LONG TERM CARE POLICY     POLICY NUMBER      [id number]    CLAIMS DEPARTMENT - [facility]     [phone] (MON - FRIDAY) 7:00 A.M.- 5:00 P.M. CENTRAL TIME    [email or web address]    Who is requesting this form or medical record from you:     [facility] INFORMATION REQUIRED    ALL MEDICAL RECORDS     How would you like to receive the form or medical records (pick-up, mail, fax):     FAX NUMBER:    [phone]    PATIENT REQUESTED A CALL BACK/TEXT WHEN INFORMATION HAS BEEN FORWARDED TO [facility]  "

## 2024-11-23 PROCEDURE — 93296 REM INTERROG EVL PM/IDS: CPT | Performed by: INTERNAL MEDICINE

## 2024-11-23 PROCEDURE — 93294 REM INTERROG EVL PM/LDLS PM: CPT | Performed by: INTERNAL MEDICINE

## 2024-12-02 RX ORDER — SIMETHICONE 80 MG
TABLET,CHEWABLE ORAL
Qty: 300 TABLET | Refills: 2 | Status: SHIPPED | OUTPATIENT
Start: 2024-12-02

## 2024-12-09 RX ORDER — INSULIN LISPRO 100 [IU]/ML
15 INJECTION, SOLUTION INTRAVENOUS; SUBCUTANEOUS
Qty: 15 ML | Refills: 3 | OUTPATIENT
Start: 2024-12-09

## 2024-12-09 NOTE — TELEPHONE ENCOUNTER
Rx Refill Note  Requested Prescriptions     Pending Prescriptions Disp Refills    Insulin Lispro (HumaLOG) 100 UNIT/ML injection 15 mL 3     Sig: Inject 15 Units under the skin into the appropriate area as directed 3 (Three) Times a Day Before Meals.      Last office visit with prescribing clinician: 10/7/2024     Next office visit with prescribing clinician: 4/7/2025       Raina Bell MA  12/09/24, 10:55 EST

## 2024-12-26 RX ORDER — INSULIN LISPRO 100 [IU]/ML
15 INJECTION, SOLUTION INTRAVENOUS; SUBCUTANEOUS
Qty: 42 ML | Refills: 3 | OUTPATIENT
Start: 2024-12-26

## 2024-12-26 NOTE — TELEPHONE ENCOUNTER
Rx Refill Note  Requested Prescriptions     Pending Prescriptions Disp Refills    Insulin Lispro (HumaLOG) 100 UNIT/ML injection 15 mL 3     Sig: Inject 15 Units under the skin into the appropriate area as directed 3 (Three) Times a Day Before Meals.      Last office visit with prescribing clinician: 10/7/2024      Next office visit with prescribing clinician: 4/7/2025       Afshan Walden MA  12/26/24, 11:15 EST

## 2024-12-30 ENCOUNTER — TELEPHONE (OUTPATIENT)
Dept: FAMILY MEDICINE CLINIC | Facility: CLINIC | Age: 84
End: 2024-12-30
Payer: MEDICARE

## 2024-12-30 NOTE — TELEPHONE ENCOUNTER
Patient has had a cough, congested for about 3-4 days. Stated he has been taking Mucinex DM 12 HOUR, he's had 6 pills and it has not got better., He would like to be seen or something be sent in.   Please advise.

## 2024-12-30 NOTE — TELEPHONE ENCOUNTER
PATIENT HAS CALLED BACK TO SEE WHAT CAN BE DONE FOR HIM, IF SOMETHING WILL BE CALLED IN OR HE BE WORKED IN. HE DOES NOT WANT TO GO TO [facility] OR ER.

## 2024-12-31 ENCOUNTER — HOSPITAL ENCOUNTER (OUTPATIENT)
Dept: GENERAL RADIOLOGY | Facility: HOSPITAL | Age: 84
Discharge: HOME | End: 2024-12-31
Admitting: PHYSICIAN ASSISTANT
Payer: MEDICARE

## 2024-12-31 ENCOUNTER — OFFICE VISIT (OUTPATIENT)
Dept: FAMILY MEDICINE CLINIC | Facility: CLINIC | Age: 84
End: 2024-12-31
Payer: MEDICARE

## 2024-12-31 VITALS
TEMPERATURE: 98.5 F | OXYGEN SATURATION: 93 % | DIASTOLIC BLOOD PRESSURE: 60 MMHG | RESPIRATION RATE: 20 BRPM | WEIGHT: 270.6 LBS | HEIGHT: 71 IN | HEART RATE: 88 BPM | BODY MASS INDEX: 37.88 KG/M2 | SYSTOLIC BLOOD PRESSURE: 124 MMHG

## 2024-12-31 DIAGNOSIS — J40 BRONCHITIS: ICD-10-CM

## 2024-12-31 DIAGNOSIS — J40 BRONCHITIS: Primary | ICD-10-CM

## 2024-12-31 LAB
EXPIRATION DATE: NORMAL
EXPIRATION DATE: NORMAL
FLUAV AG UPPER RESP QL IA.RAPID: NOT DETECTED
FLUBV AG UPPER RESP QL IA.RAPID: NOT DETECTED
INTERNAL CONTROL: NORMAL
INTERNAL CONTROL: NORMAL
Lab: NORMAL
Lab: NORMAL
RSV AG SPEC QL: NEGATIVE
SARS-COV-2 AG UPPER RESP QL IA.RAPID: NOT DETECTED

## 2024-12-31 PROCEDURE — 3074F SYST BP LT 130 MM HG: CPT | Performed by: PHYSICIAN ASSISTANT

## 2024-12-31 PROCEDURE — 99213 OFFICE O/P EST LOW 20 MIN: CPT | Performed by: PHYSICIAN ASSISTANT

## 2024-12-31 PROCEDURE — 71046 X-RAY EXAM CHEST 2 VIEWS: CPT

## 2024-12-31 PROCEDURE — 3078F DIAST BP <80 MM HG: CPT | Performed by: PHYSICIAN ASSISTANT

## 2024-12-31 PROCEDURE — 87807 RSV ASSAY W/OPTIC: CPT | Performed by: PHYSICIAN ASSISTANT

## 2024-12-31 PROCEDURE — 87428 SARSCOV & INF VIR A&B AG IA: CPT | Performed by: PHYSICIAN ASSISTANT

## 2024-12-31 PROCEDURE — 1126F AMNT PAIN NOTED NONE PRSNT: CPT | Performed by: PHYSICIAN ASSISTANT

## 2024-12-31 RX ORDER — BENZONATATE 100 MG/1
100 CAPSULE ORAL 2 TIMES DAILY
Qty: 20 CAPSULE | Refills: 0 | Status: SHIPPED | OUTPATIENT
Start: 2024-12-31

## 2024-12-31 RX ORDER — LEVOFLOXACIN 500 MG/1
500 TABLET, FILM COATED ORAL DAILY
Qty: 10 TABLET | Refills: 0 | Status: SHIPPED | OUTPATIENT
Start: 2024-12-31

## 2024-12-31 NOTE — PROGRESS NOTES
Subjective   Javier Lerner is a 84 y.o. male  Cough (Nasal/chest congestion, wheezing for several days. Flu exposure. Treating with OTC meds. )      History of Present Illness  History of Present Illness  The patient is an 84-year-old male with multiple chronic medical conditions who presents today with cough, congestion, and wheezing in his chest. He tested negative for influenza, COVID-19, RSV, and strep in the office. He is accompanied by Antony South.    His symptoms began approximately 5 days ago, characterized by chest congestion and wheezing. He reports no sore throat or fever. Upon returning home, he experienced sneezing but did not develop a sore throat or fever. He has previously been prescribed Levaquin in 2020 and 2022 without any adverse reactions. He has been self-medicating with an over-the-counter expectorant and decongestant, which he believes have provided some relief. Additionally, he has taken 6 to 8 tablets of [facility] DM Mucus DM, which he also found beneficial.  Patient has multiple chronic medical conditions, uncontrolled type 2 diabetes, hypertension, hyperlipidemia, chronic kidney disease, CHF.    MEDICATIONS  Current: Eliquis  Past: Levaquin    The following portions of the patient's history were reviewed and updated as appropriate: allergies, current medications, past social history and problem list    Review of Systems   Constitutional:  Negative for chills, fatigue and fever.   HENT: Negative.     Respiratory:  Positive for cough, chest tightness and wheezing. Negative for shortness of breath.    Cardiovascular:  Negative for chest pain.   Gastrointestinal:  Negative for nausea.       Objective     Vitals:    12/31/24 1248   BP: 124/60   Pulse: 88   Resp: 20   Temp: 98.5 °F (36.9 °C)   SpO2: 93%       Physical Exam  Vitals and nursing note reviewed.   Constitutional:       General: He is not in acute distress.     Appearance: He is well-developed. He is not diaphoretic.   HENT:       Head: Normocephalic and atraumatic.      Right Ear: Tympanic membrane and ear canal normal.      Left Ear: Tympanic membrane and ear canal normal.      Nose: Nose normal. Mucosal edema and rhinorrhea present.      Right Sinus: Maxillary sinus tenderness and frontal sinus tenderness present.      Left Sinus: Maxillary sinus tenderness and frontal sinus tenderness present.      Mouth/Throat:      Pharynx: No oropharyngeal exudate.   Eyes:      Pupils: Pupils are equal, round, and reactive to light.   Neck:      Vascular: No JVD.   Cardiovascular:      Rate and Rhythm: Normal rate and regular rhythm.      Heart sounds: Normal heart sounds. No murmur heard.  Pulmonary:      Effort: Pulmonary effort is normal. No respiratory distress.      Breath sounds: No stridor. Wheezing present.   Musculoskeletal:      Cervical back: Neck supple.   Lymphadenopathy:      Cervical: No cervical adenopathy.       Physical Exam  Wheezing noted in the lungs.    Assessment & Plan   Assessment & Plan  1. Bronchitis.  He presents with significant wheezing in the chest, congestion, and cough. Despite testing negative for influenza, COVID-19, RSV, and strep, the severity of symptoms necessitates further investigation. A chest x-ray has been ordered to rule out pneumonia. A prescription for Levaquin 500 mg has been sent to [facility] with a cough suppressant in tablet form. The potential side effects of Levaquin, including gastrointestinal upset and tendonitis, were discussed. He is advised to continue using over-the-counter expectorants and decongestants as needed.    Diagnoses and all orders for this visit:    1. Bronchitis (Primary)  -     levoFLOXacin (LEVAQUIN) 500 MG tablet; Take 1 tablet by mouth Daily.  Dispense: 10 tablet; Refill: 0  -     benzonatate (Tessalon Perles) 100 MG capsule; Take 1 capsule by mouth 2 (Two) Times a Day.  Dispense: 20 capsule; Refill: 0  -     XR Chest 2 View; Future     I spent 15 minutes in patient care:  Reviewing records prior to the visit, examining the patient, entering orders and documentation    Part of this note may be an electronic transcription/translation of spoken language to printed text using the Dragon Dictation System.       Patient or patient representative verbalized consent for the use of Ambient Listening during the visit with  EVELIO Coughlin for chart documentation. 12/31/2024  13:14 EST

## 2025-01-08 RX ORDER — INSULIN LISPRO 100 [IU]/ML
15 INJECTION, SOLUTION INTRAVENOUS; SUBCUTANEOUS
Qty: 42 ML | Refills: 3 | OUTPATIENT
Start: 2025-01-08

## 2025-01-08 NOTE — TELEPHONE ENCOUNTER
PATIENT IS CALLING STATING [facility] IS NEEDING US TO AUTHORIZE THIS PRESCRIPTION FOR [facility] SO THEY CAN MAIL PATIENT THE MEDICATION. PATIENTS NUMBER [phone]

## 2025-01-20 RX ORDER — INSULIN LISPRO 100 [IU]/ML
15 INJECTION, SOLUTION INTRAVENOUS; SUBCUTANEOUS
Qty: 42 ML | Refills: 3 | OUTPATIENT
Start: 2025-01-20 | End: 2025-01-27 | Stop reason: SDUPTHER

## 2025-01-20 NOTE — TELEPHONE ENCOUNTER
Rx Refill Note  Requested Prescriptions     Pending Prescriptions Disp Refills    Insulin Lispro (HumaLOG) 100 UNIT/ML injection 42 mL 3     Sig: Inject 15 Units under the skin into the appropriate area as directed 3 (Three) Times a Day Before Meals.      Last office visit with prescribing clinician:    10/07/2024  Next office visit with prescribing clinician: 04/07/2025    Afshan Walden MA  01/20/25, 09:46 EST

## 2025-01-20 NOTE — TELEPHONE ENCOUNTER
"  Caller: Javier Lerner \"Yfn\"    Relationship: Self    Best call back number: [phone]    Requested Prescriptions:   Requested Prescriptions     Pending Prescriptions Disp Refills    Insulin Lispro (HumaLOG) 100 UNIT/ML injection 42 mL 3     Sig: Inject 15 Units under the skin into the appropriate area as directed 3 (Three) Times a Day Before Meals.        Pharmacy where request should be sent:    [facility] [address] [phone] [facility] [phone]             Last office visit with prescribing clinician: Visit date not found   Last telemedicine visit with prescribing clinician: Visit date not found   Next office visit with prescribing clinician: Visit date not found     Additional details provided by patient: 4 PENS LEFT; SECOND REQUEST - REQUESTED 01/08/25.    Does the patient have less than a 3 day supply:  [] Yes  [x] No    Would you like a call back once the refill request has been completed: [] Yes [x] No    If the office needs to give you a call back, can they leave a voicemail: [x] Yes [] No  "

## 2025-01-27 RX ORDER — INSULIN LISPRO 100 [IU]/ML
15 INJECTION, SOLUTION INTRAVENOUS; SUBCUTANEOUS
Qty: 42 ML | Refills: 3 | Status: SHIPPED | OUTPATIENT
Start: 2025-01-27

## 2025-02-20 ENCOUNTER — TELEPHONE (OUTPATIENT)
Age: 85
End: 2025-02-20
Payer: MEDICARE

## 2025-02-20 DIAGNOSIS — G56.03 BILATERAL CARPAL TUNNEL SYNDROME: Primary | ICD-10-CM

## 2025-02-20 NOTE — TELEPHONE ENCOUNTER
Called and spoke with patient to let him know the referral has been placed and routed to neurosurgery.

## 2025-02-20 NOTE — TELEPHONE ENCOUNTER
"  Caller: Javier Lerner \"Yfn\"    Relationship: Self    Best call back number:   Telephone Information:   Mobile [phone]       What is the best time to reach you: ANYTIME, OKAY TO LVM     Who are you requesting to speak with (clinical staff, provider,  specific staff member): CLINICAL STAFF / PROVIDER     What was the call regarding: PT CALLED STATING IN HIS LAST VISIT WITH PROVIDER HE MENTIONED GETTING SCHEDULED WITH SURGEON FOR CARPATONAL IN HIS RIGHT WRIST. PT STATED HE WAS TOLD TO GIVE A CALLED WHEN HE IS READY TO SCHEDULE, PT IS READY TO SCHEDULE WITH SURGEON. PLEASE ADVISE.     "

## 2025-04-02 RX ORDER — TRIAMCINOLONE ACETONIDE 1 MG/G
1 CREAM TOPICAL 2 TIMES DAILY
Qty: 45 G | Refills: 3 | Status: SHIPPED | OUTPATIENT
Start: 2025-04-02

## 2025-04-02 RX ORDER — INSULIN LISPRO 100 [IU]/ML
15 INJECTION, SOLUTION INTRAVENOUS; SUBCUTANEOUS
Qty: 42 ML | Refills: 3 | Status: SHIPPED | OUTPATIENT
Start: 2025-04-02

## 2025-04-02 NOTE — TELEPHONE ENCOUNTER
"  Caller: Javier Lerner MONIK \"Yfn\"    Relationship: Self    Best call back number: [phone]     Requested Prescriptions:   Requested Prescriptions     Pending Prescriptions Disp Refills    Insulin Lispro (HumaLOG) 100 UNIT/ML injection 42 mL 3     Sig: Inject 15 Units under the skin into the appropriate area as directed 3 (Three) Times a Day Before Meals.    triamcinolone (KENALOG) 0.1 % cream 45 g 3     Sig: Apply 1 Application topically to the appropriate area as directed 2 (Two) Times a Day.        Pharmacy where request should be sent: [facility] [address] [phone] [facility] [phone]      Last office visit with prescribing clinician: 7/26/2024   Last telemedicine visit with prescribing clinician: Visit date not found   Next office visit with prescribing clinician: 5/27/2025     Additional details provided by patient:     Does the patient have less than a 3 day supply:  [] Yes  [] No    Would you like a call back once the refill request has been completed: [] Yes [x] No    If the office needs to give you a call back, can they leave a voicemail: [] Yes [x] No    Clay Magaña   04/02/25 12:20 EDT               "

## 2025-04-07 ENCOUNTER — OFFICE VISIT (OUTPATIENT)
Age: 85
End: 2025-04-07
Payer: MEDICARE

## 2025-04-07 VITALS
WEIGHT: 270 LBS | BODY MASS INDEX: 37.8 KG/M2 | HEIGHT: 71 IN | HEART RATE: 81 BPM | OXYGEN SATURATION: 98 % | SYSTOLIC BLOOD PRESSURE: 128 MMHG | DIASTOLIC BLOOD PRESSURE: 64 MMHG

## 2025-04-07 DIAGNOSIS — Z79.4 UNCONTROLLED DIABETES MELLITUS WITH HYPERGLYCEMIA, WITH LONG-TERM CURRENT USE OF INSULIN: Primary | ICD-10-CM

## 2025-04-07 DIAGNOSIS — E11.65 UNCONTROLLED DIABETES MELLITUS WITH HYPERGLYCEMIA, WITH LONG-TERM CURRENT USE OF INSULIN: Primary | ICD-10-CM

## 2025-04-07 LAB
EXPIRATION DATE: ABNORMAL
EXPIRATION DATE: ABNORMAL
GLUCOSE BLDC GLUCOMTR-MCNC: 162 MG/DL (ref 70–130)
HBA1C MFR BLD: 6.7 % (ref 4.5–5.7)
Lab: ABNORMAL
Lab: ABNORMAL

## 2025-04-07 NOTE — PROGRESS NOTES
"     Office Note      Date: 2025  Patient Name: Javier Lerner  MRN: [id number]  : 1940    Chief Complaint   Patient presents with    Diabetes       History of Present Illness:   Javier Lerner is a 84 y.o. male who presents for Diabetes type 2.   Current RX BBI and jardiance     Bg checks are done: with dragan /   Hypoglycemia : rare      Last A1c:  Hemoglobin A1C   Date Value Ref Range Status   2025 6.7 (A) 4.5 - 5.7 % Final   2023 8.10 (H) 4.80 - 5.60 % Final       Changes in health since last visit: none.  Subjective            Review of Systems:   Review of Systems   Eyes:  Positive for visual disturbance.       The following portions of the patient's history were reviewed and updated as appropriate: allergies, current medications, past family history, past medical history, past social history, past surgical history, and problem list.    Objective     Visit Vitals  /64 (BP Location: Right arm, Patient Position: Sitting, Cuff Size: Adult)   Pulse 81   Ht 180.3 cm (71\")   Wt 122 kg (270 lb)   SpO2 98%   BMI 37.66 kg/m²           Physical Exam:  Physical Exam  Vitals reviewed.   Constitutional:       Appearance: Normal appearance.   Neurological:      Mental Status: He is alert.   Psychiatric:         Mood and Affect: Mood normal.         Behavior: Behavior normal.         Thought Content: Thought content normal.         Judgment: Judgment normal.          Assessment / Plan      Assessment & Plan:  Problem List Items Addressed This Visit       Uncontrolled diabetes mellitus with hyperglycemia, with long-term current use of insulin - Primary    Overview             Current Assessment & Plan    Eyes- up to date- no retinopathy  Kidneys-  egfr- 43    Lipids done in October  Feet checked today    Assessment - improved- A1c 6.7  Plan : no changes.          Relevant Medications    OneTouch Verio test strip    Insulin Glargine-yfgn (Semglee, yfgn,) 100 UNIT/ML solution pen-injector "    Insulin Pen Needle (B-D UF III MINI PEN NEEDLES) 31G X 5 MM misc    Jardiance 10 MG tablet tablet    Insulin Lispro (HumaLOG) 100 UNIT/ML injection    Other Relevant Orders    POC Glucose, Blood (Completed)    POC Glycosylated Hemoglobin (Hb A1C) (Completed)         Electronically signed by : Steve Monet MD  04/07/2025

## 2025-04-07 NOTE — ASSESSMENT & PLAN NOTE
Eyes- up to date- no retinopathy  Kidneys-  egfr- 43    Lipids done in October  Feet checked today    Assessment - improved- A1c 6.7  Plan : no changes.

## 2025-04-10 ENCOUNTER — TELEPHONE (OUTPATIENT)
Dept: FAMILY MEDICINE CLINIC | Facility: CLINIC | Age: 85
End: 2025-04-10
Payer: MEDICARE

## 2025-04-10 DIAGNOSIS — E11.9 DIABETES MELLITUS WITH INSULIN THERAPY: ICD-10-CM

## 2025-04-10 DIAGNOSIS — Z79.4 DIABETES MELLITUS WITH INSULIN THERAPY: ICD-10-CM

## 2025-04-10 RX ORDER — TRIAMCINOLONE ACETONIDE 1 MG/G
1 CREAM TOPICAL 2 TIMES DAILY
Qty: 135 G | Refills: 3 | Status: SHIPPED | OUTPATIENT
Start: 2025-04-10

## 2025-04-10 RX ORDER — FLURBIPROFEN SODIUM 0.3 MG/ML
SOLUTION/ DROPS OPHTHALMIC
Qty: 270 EACH | Refills: 3 | Status: SHIPPED | OUTPATIENT
Start: 2025-04-10

## 2025-04-10 NOTE — TELEPHONE ENCOUNTER
"Caller: Javier Lerner \"Yfn\"    Relationship: Self    Best call back number:       [phone] (Mobile)     Which medication are you concerned about:       triamcinolone (KENALOG) 0.1 % cream     Who prescribed you this medication:     DR SHAHID REFILLS MEDICATION    What are your concerns:     PATIENT STATED [facility] WILL ONLY REFILL MEDICATION WITH (90) DAY SUPPLY    [facility] - [facility], [place]    TELEPHONE CONTACT:    [phone]    "

## 2025-04-10 NOTE — TELEPHONE ENCOUNTER
"Caller: Javier Lerner \"Yfn\"    Relationship: Self    Best call back number:       [phone] (Mobile)     Requested Prescriptions:   Requested Prescriptions     Pending Prescriptions Disp Refills    Insulin Pen Needle (B-D UF III MINI PEN NEEDLES) 31G X 5 MM misc 270 each 3     Sig: Use with insulin as directed, 3 times a day        sennosides-docusate (senna-docusate sodium) 8.6-50 MG per tablet     Pharmacy where request should be sent:     [facility] [address] [phone] [facility] [phone]      Last office visit with prescribing clinician: 7/26/2024   Last telemedicine visit with prescribing clinician: Visit date not found   Next office visit with prescribing clinician: 5/27/2025     Additional details provided by patient:     PATIENT STATED HE IS OUT OF THE SENNOSIDES-DOCUSATE    Does the patient have less than a 3 day supply:  [x] Yes  [] No    Would you like a call back once the refill request has been completed: [] Yes [] No    If the office needs to give you a call back, can they leave a voicemail: [] Yes [] No    Clay Avina Rep   04/10/25 11:36 EDT     "

## 2025-04-22 ENCOUNTER — OFFICE VISIT (OUTPATIENT)
Dept: NEUROSURGERY | Facility: CLINIC | Age: 85
End: 2025-04-22
Payer: MEDICARE

## 2025-04-22 VITALS
SYSTOLIC BLOOD PRESSURE: 124 MMHG | TEMPERATURE: 97.1 F | HEIGHT: 71 IN | BODY MASS INDEX: 38.86 KG/M2 | WEIGHT: 277.6 LBS | DIASTOLIC BLOOD PRESSURE: 64 MMHG

## 2025-04-22 DIAGNOSIS — G56.03 BILATERAL CARPAL TUNNEL SYNDROME: Primary | ICD-10-CM

## 2025-04-22 PROCEDURE — 99203 OFFICE O/P NEW LOW 30 MIN: CPT | Performed by: PHYSICIAN ASSISTANT

## 2025-04-22 PROCEDURE — 1159F MED LIST DOCD IN RCRD: CPT | Performed by: PHYSICIAN ASSISTANT

## 2025-04-22 PROCEDURE — 1160F RVW MEDS BY RX/DR IN RCRD: CPT | Performed by: PHYSICIAN ASSISTANT

## 2025-04-22 PROCEDURE — 3074F SYST BP LT 130 MM HG: CPT | Performed by: PHYSICIAN ASSISTANT

## 2025-04-22 PROCEDURE — 3078F DIAST BP <80 MM HG: CPT | Performed by: PHYSICIAN ASSISTANT

## 2025-04-22 NOTE — PROGRESS NOTES
Patient: Javier Lerner  : 1940  Chart #: [id number]    Date of Service: 2025    CHIEF COMPLAINT: Bilateral hand numbness and tingling    History of Present Illness Mr. Lerner is a 84-year-old RHD gentleman with past medical history of CHF, HLD, HTN, DM 2, CKD, and obesity.  He presents with complaints of bilateral hand numbness and tingling that has been present and progressive over many years.  Right> left.  He sleeps with a wrist splint on the right hand which helps with nocturnal awakenings.  He describes sensory alteration in the median nerve distribution.  No radicular spread of symptoms.      Past Medical History:   Diagnosis Date    Allergic     Tetracycline    Arthritis     Bradycardia     Cataract     Cholelithiasis     Chronic fatigue 2016    CKD (chronic kidney disease) stage 3, GFR 30-59 ml/min     Colon polyp     Diabetes insipidus ’s    Oberflow of sugar in irine    Diabetes mellitus     DX. , FSBS 1 X DAY    Enlarged prostate     Erectile dysfunction     Years ago    GERD (gastroesophageal reflux disease)     Heartburn     HL (hearing loss)     Hyperlipidemia     Hypertension     Liver disease     Myocardial infarction     Obesity, Class II, BMI 35-39.9, with comorbidity     OMAIRA (obstructive sleep apnea)     Pacemaker     Pancreatitis     HISTORY OF    Pulmonary HTN 10/16/2023    Renal insufficiency     See nephrology dr koenig    Type 2 diabetes mellitus     Wears glasses          Current Outpatient Medications:     bumetanide (BUMEX) 1 MG tablet, TAKE 1 TABLET DAILY, Disp: 90 tablet, Rfl: 3    Continuous Blood Gluc  (FreeStyle Hernan 14 Day Imperial) device, 1 application As Needed (test glucose). Dx: E11.9, Disp: 1 each, Rfl: 24    Continuous Blood Gluc Sensor (FreeStyle Hernan 14 Day Sensor) misc, 1 application As Needed (test glucose). Dx: E11.9, Disp: 1 each, Rfl: 24    Diclofenac Sodium (VOLTAREN) 1 % gel gel, Apply 4 g topically to the appropriate  area as directed 3 (Three) Times a Day., Disp: , Rfl:     Eliquis 2.5 MG tablet tablet, TAKE 1 TABLET TWICE A DAY, Disp: 180 tablet, Rfl: 3    fenofibrate (TRICOR) 145 MG tablet, TAKE 1 TABLET DAILY, Disp: 90 tablet, Rfl: 3    finasteride (PROSCAR) 5 MG tablet, TAKE 1 TABLET DAILY, Disp: 90 tablet, Rfl: 3    glucose blood test strip, OneTouch Verio test strips  USE TO TEST BLOOD GLUCOSE 3 TIMES DAILY e11.65, Disp: , Rfl:     hydrALAZINE (APRESOLINE) 100 MG tablet, Take 1 tablet by mouth 2 (Two) Times a Day., Disp: 180 tablet, Rfl: 1    Insulin Glargine-yfgn (Semglee, yfgn,) 100 UNIT/ML solution pen-injector, Inject 45 Units under the skin into the appropriate area as directed 2 (Two) Times a Day., Disp: 90 mL, Rfl: 3    Insulin Lispro (HumaLOG) 100 UNIT/ML injection, Inject 15 Units under the skin into the appropriate area as directed 3 (Three) Times a Day Before Meals., Disp: 42 mL, Rfl: 3    Insulin Pen Needle (B-D UF III MINI PEN NEEDLES) 31G X 5 MM misc, Use with insulin as directed, 3 times a day, Disp: 270 each, Rfl: 3    Jardiance 10 MG tablet tablet, TAKE 1 TABLET DAILY, Disp: 90 tablet, Rfl: 0    meclizine (ANTIVERT) 25 MG tablet, Take 1 tablet by mouth 3 (Three) Times a Day As Needed for Dizziness., Disp: 30 tablet, Rfl: 0    metoprolol tartrate (LOPRESSOR) 50 MG tablet, TAKE 1 TABLET TWICE A DAY, Disp: 180 tablet, Rfl: 3    multivitamins-minerals (PRESERVISION AREDS 2) capsule capsule, Take 1 capsule by mouth 2 (Two) Times a Day., Disp: , Rfl:     nitroglycerin (NITROSTAT) 0.4 MG SL tablet, Place 1 tablet under the tongue Every 5 (Five) Minutes As Needed for Chest Pain. Take no more than 3 doses in 15 minutes., Disp: 100 tablet, Rfl: 1    OneTouch Verio test strip, , Disp: , Rfl:     sennosides-docusate (senna-docusate sodium) 8.6-50 MG per tablet, Take 1 tablet by mouth 2 (Two) Times a Day., Disp: 180 tablet, Rfl: 1    simethicone (MYLICON) 80 MG chewable tablet, CHEW 1 TABLET THREE TIMES A DAY, Disp:  300 tablet, Rfl: 2    simvastatin (ZOCOR) 20 MG tablet, TAKE 1 TABLET EVERY NIGHT, Disp: 90 tablet, Rfl: 3    triamcinolone (KENALOG) 0.1 % cream, Apply 1 Application topically to the appropriate area as directed 2 (Two) Times a Day., Disp: 135 g, Rfl: 3    cholecalciferol (VITAMIN D3) 25 MCG (1000 UT) tablet, Take 1 tablet by mouth Daily. (Patient not taking: Reported on 2025), Disp: , Rfl:     Past Surgical History:   Procedure Laterality Date    ANKLE SURGERY Right     APPENDECTOMY      CARDIAC ELECTROPHYSIOLOGY PROCEDURE N/A 2017    Procedure: Pacemaker DC new;  Surgeon: Bryson Leon MD;  Location: [facility] INVASIVE LOCATION;  Service:     CATARACT EXTRACTION, BILATERAL      CHOLECYSTECTOMY      COLONOSCOPY      HERNIA REPAIR      UMBILICAL     INSERT / REPLACE / REMOVE PACEMAKER      PACEMAKER IMPLANTATION  2017    PER DR. LEON       Social History     Socioeconomic History    Marital status:    Tobacco Use    Smoking status: Former     Current packs/day: 0.00     Average packs/day: 0.5 packs/day for 60.0 years (30.0 ttl pk-yrs)     Types: Cigarettes, Pipe     Start date: 1954     Quit date: 2014     Years since quittin.3     Passive exposure: Never    Smokeless tobacco: Never   Vaping Use    Vaping status: Never Used   Substance and Sexual Activity    Alcohol use: Yes     Alcohol/week: 2.0 standard drinks of alcohol     Types: 2 Glasses of wine per week     Comment: Occasionally    Drug use: Never    Sexual activity: Not Currently     Partners: Female     Birth control/protection: Birth control pill         Review of Systems   Constitutional:  Positive for activity change. Negative for appetite change, chills, diaphoresis, fatigue, fever and unexpected weight change.   HENT:  Negative for congestion, dental problem, drooling, ear discharge, ear pain, facial swelling, hearing loss, mouth sores, nosebleeds, postnasal drip, rhinorrhea, sinus pressure, sinus pain,  "sneezing, sore throat, tinnitus, trouble swallowing and voice change.    Eyes:  Negative for photophobia, pain, discharge, redness, itching and visual disturbance.   Respiratory:  Negative for apnea, cough, choking, chest tightness, shortness of breath, wheezing and stridor.    Cardiovascular:  Negative for chest pain, palpitations and leg swelling.   Gastrointestinal:  Negative for abdominal distention, abdominal pain, anal bleeding, blood in stool, constipation, diarrhea, nausea, rectal pain and vomiting.   Endocrine: Negative for cold intolerance, heat intolerance, polydipsia, polyphagia and polyuria.   Genitourinary:  Negative for decreased urine volume, difficulty urinating, dysuria, enuresis, flank pain, frequency, genital sores, hematuria, penile discharge, penile pain, penile swelling, scrotal swelling, testicular pain and urgency.   Musculoskeletal:  Negative for arthralgias, back pain, gait problem, joint swelling, myalgias, neck pain and neck stiffness.   Skin:  Negative for color change, pallor, rash and wound.   Allergic/Immunologic: Negative for environmental allergies, food allergies and immunocompromised state.   Neurological:  Positive for weakness and numbness. Negative for dizziness, tremors, seizures, syncope, facial asymmetry, speech difficulty, light-headedness and headaches.   Hematological:  Negative for adenopathy. Does not bruise/bleed easily.   Psychiatric/Behavioral:  Negative for agitation, behavioral problems, confusion, decreased concentration, dysphoric mood, hallucinations, self-injury, sleep disturbance and suicidal ideas. The patient is not nervous/anxious and is not hyperactive.        Objective   Vital Signs: Blood pressure 124/64, temperature 97.1 °F (36.2 °C), temperature source Infrared, height 180.3 cm (71\"), weight 126 kg (277 lb 9.6 oz).  Physical Exam  Vitals and nursing note reviewed.   Constitutional:       General: He is not in acute distress.     Appearance: He is " well-developed.   Psychiatric:         Behavior: Behavior normal.         Thought Content: Thought content normal.     Musculoskeletal:      intact   Neurologic:     Muscle tone is normal throughout.     Coordination is intact.     Deep tendon reflexes: diminished throughout     Sensation is intact to light touch throughout.     Patient is oriented to person, place, and time.      Assessment & Plan   Diagnosis: Bilateral hand numbness and tingling consistent with carpal tunnel syndrome    Medical Decision Making: I referred patient for electrodiagnostic studies.  He will follow-up with me and we will discuss further treatment measures. Continue wrist splint.     Diagnoses and all orders for this visit:    1. Bilateral carpal tunnel syndrome (Primary)  -     EMG & Nerve Conduction Test; Future                    Hailey Sharma PA-C  Patient Care Team:  Javier Kirkpatrick MD as PCP - General (Family Medicine)  Ish Sewell DO as Consulting Physician (Cardiology)  Steve Monet MD as Consulting Physician (Endocrinology)  Prashant King MD as Consulting Physician (Cardiology)

## 2025-04-28 RX ORDER — EMPAGLIFLOZIN 10 MG/1
10 TABLET, FILM COATED ORAL DAILY
Qty: 90 TABLET | Refills: 3 | Status: SHIPPED | OUTPATIENT
Start: 2025-04-28

## 2025-05-05 ENCOUNTER — OFFICE VISIT (OUTPATIENT)
Dept: CARDIOLOGY | Facility: CLINIC | Age: 85
End: 2025-05-05
Payer: MEDICARE

## 2025-05-05 VITALS
DIASTOLIC BLOOD PRESSURE: 68 MMHG | WEIGHT: 281 LBS | OXYGEN SATURATION: 96 % | BODY MASS INDEX: 39.34 KG/M2 | HEART RATE: 80 BPM | SYSTOLIC BLOOD PRESSURE: 136 MMHG | HEIGHT: 71 IN

## 2025-05-05 DIAGNOSIS — G47.33 OSA (OBSTRUCTIVE SLEEP APNEA): ICD-10-CM

## 2025-05-05 DIAGNOSIS — G89.29 CHRONIC CHEST PAIN WITH HIGH RISK FOR CAD: ICD-10-CM

## 2025-05-05 DIAGNOSIS — R07.9 CHRONIC CHEST PAIN WITH HIGH RISK FOR CAD: ICD-10-CM

## 2025-05-05 DIAGNOSIS — I45.89 CHRONOTROPIC INCOMPETENCE: Primary | ICD-10-CM

## 2025-05-05 DIAGNOSIS — I48.19 ATRIAL FIBRILLATION, PERSISTENT: ICD-10-CM

## 2025-05-05 DIAGNOSIS — Z91.89 CHRONIC CHEST PAIN WITH HIGH RISK FOR CAD: ICD-10-CM

## 2025-05-05 DIAGNOSIS — I10 ESSENTIAL HYPERTENSION: ICD-10-CM

## 2025-05-05 RX ORDER — NITROGLYCERIN 0.4 MG/1
0.4 TABLET SUBLINGUAL
Qty: 100 TABLET | Refills: 1 | Status: SHIPPED | OUTPATIENT
Start: 2025-05-05

## 2025-05-05 NOTE — PROGRESS NOTES
Cardiac Electrophysiology Outpatient Follow Up Note            [facility]    Follow Up Office Visit      Javier Lerner  [id number]  05/05/2025  [unfilled]  [unfilled]    Primary Care Physician: Javier Kirkpatrick MD    Referred By: No ref. provider found    Subjective     Chief Complaint   Patient presents with    Chronotropic incompetence       History of Present Illness:   Javier Lerner is a 84 y.o. male who presents to my electrophysiology clinic for follow up of paroxysmal atrial fibrillation, chronotropic incompetency, hypertension, obstructive sleep apnea.      Since the patients last visit he states he has been having intermittent episodes of chest discomfort. He describes left side chest discomfort described as a dull ache radiates through to his back. He denies shortness of breath, nausea, vomiting. He is unable to tell if there are any exacerbating factors.  He does have edema.  He denies palpitations.         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 1997, FSBS 1 X DAY    Enlarged prostate     Erectile dysfunction     Years ago    GERD (gastroesophageal reflux disease)     Heartburn     HL (hearing loss)     Hyperlipidemia     Hypertension     Liver disease     Myocardial infarction     Obesity, Class II, BMI 35-39.9, with comorbidity     OMAIRA (obstructive sleep apnea)     Pacemaker     Pancreatitis     HISTORY OF    Pulmonary HTN 10/16/2023    Renal insufficiency     See nephrology dr koenig    Type 2 diabetes mellitus 1996    Wears glasses        Past Surgical History:   Procedure Laterality Date    ANKLE SURGERY Right     APPENDECTOMY      CARDIAC  ELECTROPHYSIOLOGY PROCEDURE N/A 2017    Procedure: Pacemaker DC new;  Surgeon: Bryson Leon MD;  Location: [facility] INVASIVE LOCATION;  Service:     CATARACT EXTRACTION, BILATERAL      CHOLECYSTECTOMY      COLONOSCOPY      HERNIA REPAIR      UMBILICAL     INSERT / REPLACE / REMOVE PACEMAKER      PACEMAKER IMPLANTATION  2017    PER DR. LEON       Family History   Problem Relation Age of Onset    Heart disease Mother     Diabetes Father     Hypertension Father     Heart disease Father     Sleep apnea Father        Social History     Socioeconomic History    Marital status:    Tobacco Use    Smoking status: Former     Current packs/day: 0.00     Average packs/day: 0.5 packs/day for 60.0 years (30.0 ttl pk-yrs)     Types: Cigarettes, Pipe     Start date: 1954     Quit date: 2014     Years since quittin.3     Passive exposure: Never    Smokeless tobacco: Never   Vaping Use    Vaping status: Never Used   Substance and Sexual Activity    Alcohol use: Yes     Alcohol/week: 2.0 standard drinks of alcohol     Types: 2 Glasses of wine per week     Comment: Occasionally    Drug use: Never    Sexual activity: Not Currently     Partners: Female     Birth control/protection: Birth control pill         Current Outpatient Medications:     bumetanide (BUMEX) 1 MG tablet, TAKE 1 TABLET DAILY, Disp: 90 tablet, Rfl: 3    Continuous Blood Gluc  (FreeStyle Hernan 14 Day Nova) device, 1 application As Needed (test glucose). Dx: E11.9, Disp: 1 each, Rfl: 24    Continuous Blood Gluc Sensor (FreeStyle Hernan 14 Day Sensor) misc, 1 application As Needed (test glucose). Dx: E11.9, Disp: 1 each, Rfl: 24    Diclofenac Sodium (VOLTAREN) 1 % gel gel, Apply 4 g topically to the appropriate area as directed 3 (Three) Times a Day., Disp: , Rfl:     Eliquis 2.5 MG tablet tablet, TAKE 1 TABLET TWICE A DAY, Disp: 180 tablet, Rfl: 3    fenofibrate (TRICOR) 145 MG tablet, TAKE 1 TABLET DAILY, Disp: 90  tablet, Rfl: 3    finasteride (PROSCAR) 5 MG tablet, TAKE 1 TABLET DAILY, Disp: 90 tablet, Rfl: 3    glucose blood test strip, OneTouch Verio test strips  USE TO TEST BLOOD GLUCOSE 3 TIMES DAILY e11.65, Disp: , Rfl:     hydrALAZINE (APRESOLINE) 100 MG tablet, Take 1 tablet by mouth 2 (Two) Times a Day., Disp: 180 tablet, Rfl: 1    Insulin Glargine-yfgn (Semglee, yfgn,) 100 UNIT/ML solution pen-injector, Inject 45 Units under the skin into the appropriate area as directed 2 (Two) Times a Day., Disp: 90 mL, Rfl: 3    Insulin Lispro (HumaLOG) 100 UNIT/ML injection, Inject 15 Units under the skin into the appropriate area as directed 3 (Three) Times a Day Before Meals., Disp: 42 mL, Rfl: 3    Insulin Pen Needle (B-D UF III MINI PEN NEEDLES) 31G X 5 MM misc, Use with insulin as directed, 3 times a day, Disp: 270 each, Rfl: 3    Jardiance 10 MG tablet tablet, TAKE 1 TABLET DAILY, Disp: 90 tablet, Rfl: 3    meclizine (ANTIVERT) 25 MG tablet, Take 1 tablet by mouth 3 (Three) Times a Day As Needed for Dizziness., Disp: 30 tablet, Rfl: 0    metoprolol tartrate (LOPRESSOR) 50 MG tablet, TAKE 1 TABLET TWICE A DAY, Disp: 180 tablet, Rfl: 3    multivitamins-minerals (PRESERVISION AREDS 2) capsule capsule, Take 1 capsule by mouth 2 (Two) Times a Day., Disp: , Rfl:     nitroglycerin (NITROSTAT) 0.4 MG SL tablet, Place 1 tablet under the tongue Every 5 (Five) Minutes As Needed for Chest Pain. Take no more than 3 doses in 15 minutes., Disp: 100 tablet, Rfl: 1    OneTouch Verio test strip, , Disp: , Rfl:     sennosides-docusate (senna-docusate sodium) 8.6-50 MG per tablet, Take 1 tablet by mouth 2 (Two) Times a Day., Disp: 180 tablet, Rfl: 1    simethicone (MYLICON) 80 MG chewable tablet, CHEW 1 TABLET THREE TIMES A DAY, Disp: 300 tablet, Rfl: 2    simvastatin (ZOCOR) 20 MG tablet, TAKE 1 TABLET EVERY NIGHT, Disp: 90 tablet, Rfl: 3    triamcinolone (KENALOG) 0.1 % cream, Apply 1 Application topically to the appropriate area as  "directed 2 (Two) Times a Day., Disp: 135 g, Rfl: 3    cholecalciferol (VITAMIN D3) 25 MCG (1000 UT) tablet, Take 1 tablet by mouth Daily. (Patient not taking: Reported on 5/5/2025), Disp: , Rfl:     Allergies   Allergen Reactions    Penicillins Other (See Comments)     WELTS ON HANDS AND TURN PURPLE    Tetracyclines & Related Other (See Comments)     Blisters on skin       Objective     Vitals:    05/05/25 1415   BP: (S) 136/68   BP Location: Right arm   Patient Position: Sitting   Cuff Size: Adult   Pulse: 80   SpO2: 96%   Weight: 127 kg (281 lb)   Height: 180.3 cm (71\")     Body mass index is 39.19 kg/m².    Cardiovascular:      PMI at left midclavicular line. Normal rate. Regular rhythm. Normal S1. Normal S2.       Murmurs: There is no murmur.   Edema:     Peripheral edema present.     Feet: bilateral 1+ edema of the feet.        Lab Results   Component Value Date    GLUCOSE 188 (H) 10/17/2024    CALCIUM 9.4 10/17/2024     10/17/2024    K 3.5 10/17/2024    CO2 24.8 10/17/2024     10/17/2024    BUN 32 (H) 10/17/2024    CREATININE 1.57 (H) 10/17/2024    EGFRIFNONA 42 (L) 01/04/2017    BCR 20.4 10/17/2024    ANIONGAP 14.2 10/17/2024     Lab Results   Component Value Date    WBC 11.91 (H) 12/15/2023    HGB 17.5 12/15/2023    HCT 51.4 (H) 12/15/2023    MCV 93.8 12/15/2023     12/15/2023     Lab Results   Component Value Date    INR 1.04 01/04/2017    PROTIME 11.3 01/04/2017     Lab Results   Component Value Date    TSH 1.811 09/20/2016       Cardiac Testing:     I personally viewed and interpreted the patient's EKG/Telemetry/lab data.    Procedures    Tobacco Cessation: N/A  Obstructive Sleep Apnea Screening: Completed    Advance Care Planning   ACP discussion was held with the patient during this visit. Patient has an advance directive (not in EMR), copy requested.       Assessment & Plan      Assessment & Plan  Chronotropic incompetence  DEVICE INTERROGATION:  BTK: RA pacing 98%, RV pacing 2%. P " wave is paced at 40 with a threshold of 0.8 V at 0.4 msec and an impedance of 504 ohms. R wave is 12.8 mV with a threshold of 1.0 V at 0.4 msec and an impedance of 487 ohms. Battery voltage is 35%. Events: approx 5.5 hours total since 24 longest 5 min most seems to be in July.           Atrial fibrillation, persistent  Chronically anticoagulated with Eliquis 2.5mg PO BID       OMAIRA (obstructive sleep apnea)  BiPAP complaint       Essential hypertension    Initial blood pressure 182/88, retake 136/68         Chronic chest pain with high risk for CAD  Since the patients last visit he states he has been having intermittent episodes of chest discomfort. He describes left side chest discomfort described as a dull ache radiates through to his back. He denies shortness of breath, nausea, vomiting. He is unable to tell if there are any exacerbating factors.  He does have edema.  He denies palpitations.     Symptoms the patient describes are very suspicious for coronary in nature.  We discussed undergoing repeat ischemic evaluation.  The patient is reluctant to proceed.  He would like to talk to his primary cardiologist Dr. King.  I did call him in a prescription for nitroglycerin sublingual as he is unsure if his current prescription is  or not.  Patient is been instructed if he would like to go ahead with imaging prior to seeing Dr. Knig I be happy to arrange that for him if he should change his mind.             Follow Up:   Return in about 1 year (around 2026).    Thank you for allowing me to participate in the care of your patient. Please to not hesitate to contact me with additional questions or concerns.      BOB Valladares  [facility] / [facility]

## 2025-05-05 NOTE — ASSESSMENT & PLAN NOTE
DEVICE INTERROGATION:  BTK: RA pacing 98%, RV pacing 2%. P wave is paced at 40 with a threshold of 0.8 V at 0.4 msec and an impedance of 504 ohms. R wave is 12.8 mV with a threshold of 1.0 V at 0.4 msec and an impedance of 487 ohms. Battery voltage is 35%. Events: approx 5.5 hours total since 4/22/24 longest 5 min most seems to be in July.

## 2025-05-07 ENCOUNTER — OFFICE VISIT (OUTPATIENT)
Dept: NEUROSURGERY | Facility: CLINIC | Age: 85
End: 2025-05-07
Payer: MEDICARE

## 2025-05-07 ENCOUNTER — TELEPHONE (OUTPATIENT)
Dept: CARDIOLOGY | Facility: CLINIC | Age: 85
End: 2025-05-07
Payer: MEDICARE

## 2025-05-07 VITALS — TEMPERATURE: 97.7 F | WEIGHT: 277.2 LBS | HEIGHT: 71 IN | BODY MASS INDEX: 38.81 KG/M2

## 2025-05-07 DIAGNOSIS — G56.01 RIGHT CARPAL TUNNEL SYNDROME: ICD-10-CM

## 2025-05-07 DIAGNOSIS — G56.03 BILATERAL CARPAL TUNNEL SYNDROME: Primary | ICD-10-CM

## 2025-05-07 PROCEDURE — 1160F RVW MEDS BY RX/DR IN RCRD: CPT | Performed by: PHYSICIAN ASSISTANT

## 2025-05-07 PROCEDURE — 99214 OFFICE O/P EST MOD 30 MIN: CPT | Performed by: PHYSICIAN ASSISTANT

## 2025-05-07 PROCEDURE — 1159F MED LIST DOCD IN RCRD: CPT | Performed by: PHYSICIAN ASSISTANT

## 2025-05-07 RX ORDER — FLUOROURACIL 50 MG/G
CREAM TOPICAL
COMMUNITY

## 2025-05-07 RX ORDER — CHLORHEXIDINE GLUCONATE 40 MG/ML
1 SOLUTION TOPICAL DAILY
Qty: 236 ML | Refills: 0 | Status: SHIPPED | OUTPATIENT
Start: 2025-05-07

## 2025-05-07 NOTE — PROGRESS NOTES
Patient: Javier Lerner  : 1940  Chart #: [id number]    Date of Service: 2025    CHIEF COMPLAINT: Bilateral hand numbness and tingling    History of Present Illness Mr. Lerner is a 84-year-old RHD gentleman with past medical history of CHF, HLD, HTN, DM 2, CKD, and obesity.  He presents with complaints of bilateral hand numbness and tingling that has been present and progressive over many years.  Right> left.  He sleeps with a wrist splint on the right hand which helps with nocturnal awakenings.  He describes sensory alteration in the median nerve distribution.  No radicular spread of symptoms.  He has got some very mild bilateral  weakness which has not been progressive      Past Medical History:   Diagnosis Date    Allergic     Tetracycline    Arthritis     Bradycardia     Cataract     Cholelithiasis     Chronic fatigue 2016    CKD (chronic kidney disease) stage 3, GFR 30-59 ml/min     Colon polyp     Diabetes insipidus ’s    Oberflow of sugar in irine    Diabetes mellitus     DX. , FSBS 1 X DAY    Enlarged prostate     Erectile dysfunction     Years ago    GERD (gastroesophageal reflux disease)     Heartburn     HL (hearing loss)     Hyperlipidemia     Hypertension     Liver disease     Myocardial infarction     Obesity, Class II, BMI 35-39.9, with comorbidity     OMAIRA (obstructive sleep apnea)     Pacemaker     Pancreatitis     HISTORY OF    Pulmonary HTN 10/16/2023    Renal insufficiency     See nephrology dr koenig    Type 2 diabetes mellitus     Wears glasses          Current Outpatient Medications:     bumetanide (BUMEX) 1 MG tablet, TAKE 1 TABLET DAILY, Disp: 90 tablet, Rfl: 3    Continuous Blood Gluc  (FreeStyle Hernan 14 Day [place]) device, 1 application As Needed (test glucose). Dx: E11.9, Disp: 1 each, Rfl: 24    Continuous Blood Gluc Sensor (FreeStyle Hernan 14 Day Sensor) misc, 1 application As Needed (test glucose). Dx: E11.9, Disp: 1 each, Rfl: 24     Diclofenac Sodium (VOLTAREN) 1 % gel gel, Apply 4 g topically to the appropriate area as directed 3 (Three) Times a Day., Disp: , Rfl:     Eliquis 2.5 MG tablet tablet, TAKE 1 TABLET TWICE A DAY, Disp: 180 tablet, Rfl: 3    fenofibrate (TRICOR) 145 MG tablet, TAKE 1 TABLET DAILY, Disp: 90 tablet, Rfl: 3    finasteride (PROSCAR) 5 MG tablet, TAKE 1 TABLET DAILY, Disp: 90 tablet, Rfl: 3    fluorouracil (EFUDEX) 5 % cream, Please see attached for detailed directions, Disp: , Rfl:     glucose blood test strip, OneTouch Verio test strips  USE TO TEST BLOOD GLUCOSE 3 TIMES DAILY e11.65, Disp: , Rfl:     hydrALAZINE (APRESOLINE) 100 MG tablet, Take 1 tablet by mouth 2 (Two) Times a Day., Disp: 180 tablet, Rfl: 1    Insulin Glargine-yfgn (Semglee, yfgn,) 100 UNIT/ML solution pen-injector, Inject 45 Units under the skin into the appropriate area as directed 2 (Two) Times a Day., Disp: 90 mL, Rfl: 3    Insulin Lispro (HumaLOG) 100 UNIT/ML injection, Inject 15 Units under the skin into the appropriate area as directed 3 (Three) Times a Day Before Meals., Disp: 42 mL, Rfl: 3    Insulin Pen Needle (B-D UF III MINI PEN NEEDLES) 31G X 5 MM misc, Use with insulin as directed, 3 times a day, Disp: 270 each, Rfl: 3    Jardiance 10 MG tablet tablet, TAKE 1 TABLET DAILY, Disp: 90 tablet, Rfl: 3    meclizine (ANTIVERT) 25 MG tablet, Take 1 tablet by mouth 3 (Three) Times a Day As Needed for Dizziness., Disp: 30 tablet, Rfl: 0    metoprolol tartrate (LOPRESSOR) 50 MG tablet, TAKE 1 TABLET TWICE A DAY, Disp: 180 tablet, Rfl: 3    multivitamins-minerals (PRESERVISION AREDS 2) capsule capsule, Take 1 capsule by mouth 2 (Two) Times a Day., Disp: , Rfl:     nitroglycerin (NITROSTAT) 0.4 MG SL tablet, Place 1 tablet under the tongue Every 5 (Five) Minutes As Needed for Chest Pain. Take no more than 3 doses in 15 minutes., Disp: 100 tablet, Rfl: 1    OneTouch Verio test strip, , Disp: , Rfl:     sennosides-docusate (senna-docusate sodium)  8.6-50 MG per tablet, Take 1 tablet by mouth 2 (Two) Times a Day., Disp: 180 tablet, Rfl: 1    simethicone (MYLICON) 80 MG chewable tablet, CHEW 1 TABLET THREE TIMES A DAY, Disp: 300 tablet, Rfl: 2    simvastatin (ZOCOR) 20 MG tablet, TAKE 1 TABLET EVERY NIGHT, Disp: 90 tablet, Rfl: 3    triamcinolone (KENALOG) 0.1 % cream, Apply 1 Application topically to the appropriate area as directed 2 (Two) Times a Day., Disp: 135 g, Rfl: 3    Chlorhexidine Gluconate 4 % solution, Apply 1 Application topically to the appropriate area as directed Daily. Shower with hibiclens solution for 5 days prior to surgery. Bring prescription to [facility] pharmacy to be filled if possible., Disp: 236 mL, Rfl: 0    Past Surgical History:   Procedure Laterality Date    ANKLE SURGERY Right     APPENDECTOMY      CARDIAC ELECTROPHYSIOLOGY PROCEDURE N/A 2017    Procedure: Pacemaker DC new;  Surgeon: Bryson Leon MD;  Location: [facility] INVASIVE LOCATION;  Service:     CATARACT EXTRACTION, BILATERAL      CHOLECYSTECTOMY      COLONOSCOPY      HERNIA REPAIR      UMBILICAL     INSERT / REPLACE / REMOVE PACEMAKER      PACEMAKER IMPLANTATION  2017    PER DR. LEON       Social History     Socioeconomic History    Marital status:    Tobacco Use    Smoking status: Former     Current packs/day: 0.00     Average packs/day: 0.5 packs/day for 60.0 years (30.0 ttl pk-yrs)     Types: Cigarettes, Pipe     Start date: 1954     Quit date: 2014     Years since quittin.3     Passive exposure: Never    Smokeless tobacco: Never   Vaping Use    Vaping status: Never Used   Substance and Sexual Activity    Alcohol use: Yes     Alcohol/week: 2.0 standard drinks of alcohol     Types: 2 Glasses of wine per week     Comment: Occasionally    Drug use: Never    Sexual activity: Not Currently     Partners: Female     Birth control/protection: Birth control pill         Review of Systems   Constitutional:  Positive for activity  change. Negative for appetite change, chills, diaphoresis, fatigue, fever and unexpected weight change.   HENT:  Negative for congestion, dental problem, drooling, ear discharge, ear pain, facial swelling, hearing loss, mouth sores, nosebleeds, postnasal drip, rhinorrhea, sinus pressure, sinus pain, sneezing, sore throat, tinnitus, trouble swallowing and voice change.    Eyes:  Negative for photophobia, pain, discharge, redness, itching and visual disturbance.   Respiratory:  Negative for apnea, cough, choking, chest tightness, shortness of breath, wheezing and stridor.    Cardiovascular:  Negative for chest pain, palpitations and leg swelling.   Gastrointestinal:  Negative for abdominal distention, abdominal pain, anal bleeding, blood in stool, constipation, diarrhea, nausea, rectal pain and vomiting.   Endocrine: Negative for cold intolerance, heat intolerance, polydipsia, polyphagia and polyuria.   Genitourinary:  Negative for decreased urine volume, difficulty urinating, dysuria, enuresis, flank pain, frequency, genital sores, hematuria, penile discharge, penile pain, penile swelling, scrotal swelling, testicular pain and urgency.   Musculoskeletal:  Negative for arthralgias, back pain, gait problem, joint swelling, myalgias, neck pain and neck stiffness.   Skin:  Negative for color change, pallor, rash and wound.   Allergic/Immunologic: Negative for environmental allergies, food allergies and immunocompromised state.   Neurological:  Positive for weakness and numbness. Negative for dizziness, tremors, seizures, syncope, facial asymmetry, speech difficulty, light-headedness and headaches.   Hematological:  Negative for adenopathy. Does not bruise/bleed easily.   Psychiatric/Behavioral:  Negative for agitation, behavioral problems, confusion, decreased concentration, dysphoric mood, hallucinations, self-injury, sleep disturbance and suicidal ideas. The patient is not nervous/anxious and is not hyperactive.   "      Objective   Vital Signs: Temperature 97.7 °F (36.5 °C), temperature source Infrared, height 180.3 cm (71\"), weight 126 kg (277 lb 3.2 oz).  Physical Exam  Vitals and nursing note reviewed.   Constitutional:       General: He is not in acute distress.     Appearance: He is well-developed.   Psychiatric:         Behavior: Behavior normal.         Thought Content: Thought content normal.     Musculoskeletal:      intact   Neurologic:     Muscle tone is normal throughout.     Coordination is intact.     Deep tendon reflexes: diminished throughout     Sensation is intact to light touch throughout.     Patient is oriented to person, place, and time.    Electrodiagnostic studies demonstrate severe right carpal tunnel syndrome, moderate to severe left carpal tunnel syndrome.  Additionally there is mild to moderate bilateral ulnar neuropathies at the elbow    Assessment & Plan   Diagnosis:   1.  Right greater than left carpal tunnel syndrome    Medical Decision Making: Patient has struggled with carpal tunnel syndrome for a number of years.  He wears wrist splints at night yet his symptoms have been progressive.  Electrodiagnostic studies confirm severe right greater than left carpal tunnel syndrome.  Dr. Iraheta has recommended a staged decompression beginning on the right.  I have discussed the general nature of the procedure as well as risks, limitations and complications and patient would like to proceed.  He will need to discontinue Eliquis in the perioperative period.          Diagnoses and all orders for this visit:    1. Bilateral carpal tunnel syndrome (Primary)    2. Right carpal tunnel syndrome                  Hailey Sharma PA-C  Patient Care Team:  Javier Kirkpatrick MD as PCP - General (Family Medicine)  Ish Sewell DO as Consulting Physician (Cardiology)  Steve Monet MD as Consulting Physician (Endocrinology)  Prashant King MD as Consulting Physician " (Cardiology)

## 2025-05-07 NOTE — TELEPHONE ENCOUNTER
Patient will be undergoing Carpal tunnel release surgery with Dr. Rio Iraheta.     Dr. Iraheta's office called is requesting patient hold eliquis 3 days prior to surgery.     Please advise.

## 2025-05-12 DIAGNOSIS — R07.9 CHEST PAIN, UNSPECIFIED TYPE: Primary | ICD-10-CM

## 2025-05-12 NOTE — TELEPHONE ENCOUNTER
Patient would like you to call him to discuss having a nuclear stress test so he can get clearance for his carpal tunnel surgery.

## 2025-05-21 ENCOUNTER — HOSPITAL ENCOUNTER (OUTPATIENT)
Dept: CARDIOLOGY | Facility: HOSPITAL | Age: 85
Discharge: HOME | End: 2025-05-21
Payer: MEDICARE

## 2025-05-21 DIAGNOSIS — R07.9 CHEST PAIN, UNSPECIFIED TYPE: ICD-10-CM

## 2025-05-21 RX ORDER — REGADENOSON 0.08 MG/ML
0.4 INJECTION, SOLUTION INTRAVENOUS ONCE
Status: DISCONTINUED | OUTPATIENT
Start: 2025-05-21 | End: 2025-05-22 | Stop reason: HOSPADM

## 2025-05-22 ENCOUNTER — HOSPITAL ENCOUNTER (OUTPATIENT)
Dept: CARDIOLOGY | Facility: HOSPITAL | Age: 85
Discharge: HOME | End: 2025-05-22
Admitting: INTERNAL MEDICINE
Payer: MEDICARE

## 2025-05-22 PROCEDURE — 78431 MYOCRD IMG PET RST&STRS CT: CPT

## 2025-05-22 PROCEDURE — 34310000005 RUBIDIUM CHLORIDE: Performed by: INTERNAL MEDICINE

## 2025-05-22 PROCEDURE — 93017 CV STRESS TEST TRACING ONLY: CPT

## 2025-05-22 PROCEDURE — A9555 RB82 RUBIDIUM: HCPCS | Performed by: INTERNAL MEDICINE

## 2025-05-22 PROCEDURE — 25010000002 REGADENOSON 0.4 MG/5ML SOLUTION: Performed by: INTERNAL MEDICINE

## 2025-05-22 RX ORDER — REGADENOSON 0.08 MG/ML
0.4 INJECTION, SOLUTION INTRAVENOUS ONCE
Status: COMPLETED | OUTPATIENT
Start: 2025-05-22 | End: 2025-05-22

## 2025-05-22 RX ADMIN — REGADENOSON 0.4 MG: 0.08 INJECTION, SOLUTION INTRAVENOUS at 08:14

## 2025-05-22 RX ADMIN — RUBIDIUM CHLORIDE RB-82 1 DOSE: 150 INJECTION, SOLUTION INTRAVENOUS at 08:15

## 2025-05-22 RX ADMIN — RUBIDIUM CHLORIDE RB-82 1 DOSE: 150 INJECTION, SOLUTION INTRAVENOUS at 08:03

## 2025-05-23 ENCOUNTER — TELEPHONE (OUTPATIENT)
Dept: CARDIOLOGY | Facility: CLINIC | Age: 85
End: 2025-05-23
Payer: MEDICARE

## 2025-05-23 LAB
BH CV REST NUCLEAR ISOTOPE DOSE: 29.4 MCI
BH CV STRESS BP STAGE 2: NORMAL
BH CV STRESS BP STAGE 4: NORMAL
BH CV STRESS COMMENTS STAGE 1: NORMAL
BH CV STRESS DOSE REGADENOSON STAGE 1: 0.4
BH CV STRESS DURATION MIN STAGE 1: 1
BH CV STRESS DURATION MIN STAGE 2: 1
BH CV STRESS DURATION MIN STAGE 3: 1
BH CV STRESS DURATION MIN STAGE 4: 1
BH CV STRESS DURATION SEC STAGE 1: 0
BH CV STRESS DURATION SEC STAGE 2: 0
BH CV STRESS DURATION SEC STAGE 3: 0
BH CV STRESS DURATION SEC STAGE 4: 0
BH CV STRESS HR STAGE 1: 80
BH CV STRESS HR STAGE 2: 85
BH CV STRESS HR STAGE 3: 82
BH CV STRESS HR STAGE 4: 80
BH CV STRESS NUCLEAR ISOTOPE DOSE: 29.4 MCI
BH CV STRESS O2 STAGE 1: 94
BH CV STRESS O2 STAGE 2: 99
BH CV STRESS O2 STAGE 3: 99
BH CV STRESS O2 STAGE 4: 99
BH CV STRESS PROTOCOL 1: NORMAL
BH CV STRESS RECOVERY BP: NORMAL MMHG
BH CV STRESS RECOVERY HR: 74 BPM
BH CV STRESS RECOVERY O2: 96 %
BH CV STRESS STAGE 1: 1
BH CV STRESS STAGE 2: 2
BH CV STRESS STAGE 3: 3
BH CV STRESS STAGE 4: 4
MAXIMAL PREDICTED HEART RATE: 136 BPM
PERCENT MAX PREDICTED HR: 63.24 %
STRESS BASELINE BP: NORMAL MMHG
STRESS BASELINE HR: 76 BPM
STRESS O2 SAT REST: 94 %
STRESS PERCENT HR: 74 %
STRESS POST ESTIMATED WORKLOAD: 1 METS
STRESS POST EXERCISE DUR MIN: 4 MIN
STRESS POST EXERCISE DUR SEC: 0 SEC
STRESS POST O2 SAT PEAK: 99 %
STRESS POST PEAK BP: NORMAL MMHG
STRESS POST PEAK HR: 86 BPM
STRESS TARGET HR: 116 BPM

## 2025-05-23 NOTE — TELEPHONE ENCOUNTER
Prashant King MD Jackson, Kristina, RN  Stress test low risk at current.    Pt called  informed of the above results, verbalized understanding.

## 2025-05-28 ENCOUNTER — OFFICE VISIT (OUTPATIENT)
Dept: CARDIOLOGY | Facility: CLINIC | Age: 85
End: 2025-05-28
Payer: MEDICARE

## 2025-05-28 VITALS
DIASTOLIC BLOOD PRESSURE: 68 MMHG | SYSTOLIC BLOOD PRESSURE: 140 MMHG | OXYGEN SATURATION: 96 % | HEART RATE: 76 BPM | WEIGHT: 270 LBS | HEIGHT: 71 IN | BODY MASS INDEX: 37.8 KG/M2

## 2025-05-28 DIAGNOSIS — Z79.4 TYPE 2 DIABETES MELLITUS WITHOUT COMPLICATION, WITH LONG-TERM CURRENT USE OF INSULIN: ICD-10-CM

## 2025-05-28 DIAGNOSIS — E78.2 MIXED HYPERLIPIDEMIA: ICD-10-CM

## 2025-05-28 DIAGNOSIS — I50.32 CHRONIC HEART FAILURE WITH PRESERVED EJECTION FRACTION (HFPEF): Primary | ICD-10-CM

## 2025-05-28 DIAGNOSIS — I10 ESSENTIAL HYPERTENSION: ICD-10-CM

## 2025-05-28 DIAGNOSIS — E11.9 TYPE 2 DIABETES MELLITUS WITHOUT COMPLICATION, WITH LONG-TERM CURRENT USE OF INSULIN: ICD-10-CM

## 2025-05-28 NOTE — PROGRESS NOTES
[facility] Cardiology  Office Progress Note  Javier Lerner  1940  [address] [facility] [address]       Visit Date: 05/28/25    PCP: Javier Kirkpatrick MD  [address]   [facility] [address]    IDENTIFICATION: A 84 y.o. 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  [place]otor homes to [place] - no longer travels     PROBLEM LIST:  CAD  MPS 10/16: normal perfusion with no evidence of ischemia, EF 67%.  9/20 Lexiscan wnl w diaphragm attn EF 60%  Echo 9/2023: LVEF 51-55%, mild to mod pulm hypertension  proBNP normal 9/23  Fanny PET 5/25 wnl EF >60%, diffuse multivessel coronary calcifications noted  Symptomatic bradycardia  Hesselson: holter avg rate mid 40's and planned PPM in January  Echo 10/16: LVEF >70, Anatomically and functionally normal valves  BTK ppm 1/17- abh lower rate 70 ([place]asbo following)  HTN  HLD  7/2018   HDL 30 LDL 46  1/23 138/177/41/67  Chronic fatigue  CKD, stage 3, GFR 30-59   9/23 1.5  Obesity  DMT2  A1c 9-7.2 2017 4/23 A1c 7.8  4/25 6.7  OMAIRA with BiPAP  Tobacco use - cessation 3 years ago  Carpal tunnel synd 2025   PAF  [id number], a/c Eliquis      CC:   Chief Complaint   Patient presents with    Chronic heart failure with preserved ejection fraction     1 year follow up       Allergies  Allergies   Allergen Reactions    Penicillins Other (See Comments)     WELTS ON HANDS AND TURN PURPLE    Tetracyclines & Related Other (See Comments)     Blisters on skin       Current Medications  Current Outpatient Medications   Medication Instructions    bumetanide (BUMEX) 1 mg, Oral, Daily    Chlorhexidine Gluconate 4 % solution 1 Application, Topical, Daily, Shower with hibiclens solution for 5 days prior to surgery. Bring prescription to [facility] to be filled if possible.    Continuous Blood Gluc  (FreeStyle Hernan 14 Day [place]) device 1 application , Not Applicable, As Needed, Dx: E11.9     "Continuous Blood Gluc Sensor (FreeStyle Hernan 14 Day Sensor) misc 1 application , Not Applicable, As Needed, Dx: E11.9    Diclofenac Sodium (VOLTAREN) 4 g, As Needed    Eliquis 2.5 mg, Oral, 2 Times Daily    fenofibrate (TRICOR) 145 mg, Oral, Daily    finasteride (PROSCAR) 5 mg, Oral, Daily    fluorouracil (EFUDEX) 5 % cream Please see attached for detailed directions    glucose blood test strip OneTouch Verio test strips   USE TO TEST BLOOD GLUCOSE 3 TIMES DAILY e11.65    hydrALAZINE (APRESOLINE) 100 mg, Oral, 2 Times Daily    Insulin Glargine-yfgn (SEMGLEE (YFGN)) 45 Units, Subcutaneous, 2 Times Daily    Insulin Lispro (HUMALOG) 15 Units, Subcutaneous, 3 Times Daily Before Meals    Insulin Pen Needle (B-D UF III MINI PEN NEEDLES) 31G X 5 MM misc Use with insulin as directed, 3 times a day    Jardiance 10 mg, Oral, Daily    meclizine (ANTIVERT) 25 mg, Oral, 3 Times Daily PRN    metoprolol tartrate (LOPRESSOR) 50 mg, Oral, 2 Times Daily    multivitamins-minerals (PRESERVISION AREDS 2) capsule capsule 1 capsule, Daily    nitroglycerin (NITROSTAT) 0.4 mg, Sublingual, Every 5 Minutes PRN, Take no more than 3 doses in 15 minutes.     OneTouch Verio test strip No dose, route, or frequency recorded.    sennosides-docusate (senna-docusate sodium) 8.6-50 MG per tablet 1 tablet, Oral, 2 Times Daily    simethicone (MYLICON) 80 MG chewable tablet CHEW 1 TABLET THREE TIMES A DAY    simvastatin (ZOCOR) 20 mg, Nightly    triamcinolone (KENALOG) 0.1 % cream 1 Application, Topical, 2 Times Daily        History of Present Illness   Javier Lerner is a 84 y.o. year old male here for follow up.  BAseline gutiérrez no cp.  Bilat CTS considering release.     OBJECTIVE:  Vitals:    05/28/25 1327   BP: 140/68   BP Location: Right arm   Patient Position: Sitting   Pulse: 76   SpO2: 96%   Weight: 122 kg (270 lb)   Height: 179.1 cm (70.5\")       Wt Readings from Last 3 Encounters:   05/28/25 122 kg (270 lb)   05/07/25 126 kg (277 lb 3.2 oz) "   05/05/25 127 kg (281 lb)      Body mass index is 38.19 kg/m².    Vitals reviewed.   Constitutional:       Appearance: Not in distress. Morbidly obese. Chronically ill-appearing.   Pulmonary:      Effort: Pulmonary effort is normal.      Breath sounds: Normal breath sounds.   Cardiovascular:      Normal rate. Regular rhythm. Normal S1. Normal S2.       Murmurs: There is no murmur.   Edema:     Peripheral edema present.  Skin:     General: Skin is warm and dry.   Neurological:      General: No focal deficit present.      Mental Status: Alert and oriented to person, place and time.   Psychiatric:         Behavior: Behavior is cooperative.       Diagnostic Data:  Procedures    Advance Care Planning   ACP discussion was declined by the patient. Patient has an advance directive (not in EMR), copy requested.         ASSESSMENT:   Diagnosis Plan   1. Chronic heart failure with preserved ejection fraction (HFpEF)        2. Essential hypertension        3. Mixed hyperlipidemia        4. Type 2 diabetes mellitus without complication, with long-term current use of insulin              PLAN:  HFpEF continue current GDMT    Paroxysmal A-fib continue systemic anticoagulation    Insulin requiring diabetes with improved A1c and decrease insulin requirements    Hypertension continue GDMT CHF regimen    Prashant King MD, FACC

## 2025-05-29 ENCOUNTER — TELEPHONE (OUTPATIENT)
Dept: CARDIOLOGY | Facility: CLINIC | Age: 85
End: 2025-05-29
Payer: MEDICARE

## 2025-05-29 NOTE — TELEPHONE ENCOUNTER
Dr. Iraheta's office called requesting cardiac clearance for carpal tunnel release surgery. They are requesting patient hold Eliquis 3 day prior to surgery. Patient was seen in our office yesterday. Please advise.

## 2025-05-30 NOTE — TELEPHONE ENCOUNTER
Spoke with patient about recommendations. Patient verbalizes understanding. Contacted Dr. Iraheta's office, LVM that patient can hold Eliquis 3 days prior. Left office number to return call with any questions.

## 2025-06-09 RX ORDER — FENOFIBRATE 145 MG/1
145 TABLET, FILM COATED ORAL DAILY
Qty: 90 TABLET | Refills: 3 | Status: SHIPPED | OUTPATIENT
Start: 2025-06-09

## 2025-06-09 RX ORDER — FINASTERIDE 5 MG/1
5 TABLET, FILM COATED ORAL DAILY
Qty: 90 TABLET | Refills: 3 | Status: SHIPPED | OUTPATIENT
Start: 2025-06-09

## 2025-06-16 ENCOUNTER — PRE-ADMISSION TESTING (OUTPATIENT)
Dept: PREADMISSION TESTING | Facility: HOSPITAL | Age: 85
End: 2025-06-16
Payer: MEDICARE

## 2025-06-16 VITALS — BODY MASS INDEX: 37.89 KG/M2 | WEIGHT: 270.61 LBS | HEIGHT: 71 IN

## 2025-06-16 DIAGNOSIS — G56.01 RIGHT CARPAL TUNNEL SYNDROME: ICD-10-CM

## 2025-06-16 LAB
ALBUMIN SERPL-MCNC: 4 G/DL (ref 3.5–5.2)
ALBUMIN/GLOB SERPL: 1.6 G/DL
ALP SERPL-CCNC: 51 U/L (ref 39–117)
ALT SERPL W P-5'-P-CCNC: 10 U/L (ref 1–41)
ANION GAP SERPL CALCULATED.3IONS-SCNC: 11 MMOL/L (ref 5–15)
AST SERPL-CCNC: 17 U/L (ref 1–40)
BILIRUB SERPL-MCNC: 0.5 MG/DL (ref 0–1.2)
BUN SERPL-MCNC: 21.7 MG/DL (ref 8–23)
BUN/CREAT SERPL: 16.2 (ref 7–25)
CALCIUM SPEC-SCNC: 9 MG/DL (ref 8.6–10.5)
CHLORIDE SERPL-SCNC: 102 MMOL/L (ref 98–107)
CO2 SERPL-SCNC: 25 MMOL/L (ref 22–29)
CREAT SERPL-MCNC: 1.34 MG/DL (ref 0.76–1.27)
DEPRECATED RDW RBC AUTO: 45.5 FL (ref 37–54)
EGFRCR SERPLBLD CKD-EPI 2021: 52.2 ML/MIN/1.73
ERYTHROCYTE [DISTWIDTH] IN BLOOD BY AUTOMATED COUNT: 13.1 % (ref 12.3–15.4)
GLOBULIN UR ELPH-MCNC: 2.5 GM/DL
GLUCOSE SERPL-MCNC: 202 MG/DL (ref 65–99)
HCT VFR BLD AUTO: 49.1 % (ref 37.5–51)
HGB BLD-MCNC: 16.1 G/DL (ref 13–17.7)
INR PPP: 1.24 (ref 0.89–1.12)
MCH RBC QN AUTO: 31.1 PG (ref 26.6–33)
MCHC RBC AUTO-ENTMCNC: 32.8 G/DL (ref 31.5–35.7)
MCV RBC AUTO: 95 FL (ref 79–97)
PLATELET # BLD AUTO: 260 10*3/MM3 (ref 140–450)
PMV BLD AUTO: 10 FL (ref 6–12)
POTASSIUM SERPL-SCNC: 4.1 MMOL/L (ref 3.5–5.2)
PROT SERPL-MCNC: 6.5 G/DL (ref 6–8.5)
PROTHROMBIN TIME: 16.4 SECONDS (ref 12.2–15.3)
RBC # BLD AUTO: 5.17 10*6/MM3 (ref 4.14–5.8)
SODIUM SERPL-SCNC: 138 MMOL/L (ref 136–145)
WBC NRBC COR # BLD AUTO: 8.13 10*3/MM3 (ref 3.4–10.8)

## 2025-06-16 PROCEDURE — 85610 PROTHROMBIN TIME: CPT

## 2025-06-16 PROCEDURE — 80053 COMPREHEN METABOLIC PANEL: CPT

## 2025-06-16 PROCEDURE — 36415 COLL VENOUS BLD VENIPUNCTURE: CPT

## 2025-06-16 PROCEDURE — 85027 COMPLETE CBC AUTOMATED: CPT

## 2025-06-16 NOTE — PAT
An arrival time for procedure was not provided during PAT visit. If patient had any questions or concerns about their arrival time, they were instructed to contact their surgeon/physician.  Additionally, if the patient referred to an arrival time that was acquired from their my chart account, patient was encouraged to verify that time with their surgeon/physician. Arrival times are NOT provided in Pre Admission Testing Department.    Patient denies any current skin issues.     Bactroban (if prescribed) and Chlorhexidine Prescription prescribed by physician before PAT visit.  Verified with patient that medication(s) were picked up from their pharmacy.  Written instructions given to patient during PAT visit.  Patient/family also instructed to complete skin prep checklist and return the checklist on the day of surgery to preoperative staff.  Patient/family verbalized understanding.    Patient to apply Chlorhexadine wipes  to surgical area (as instructed) the night before procedure and the AM of procedure. Wipes provided.    One-on-one Pre Admission Testing general education provided during PAT visit.  Copies of PAT general education handouts (Incentive Spirometry, Meds to Beds Program, Patient Belongings, Pre-op skin preparation instructions, Blood Glucose testing, Visitor policy, Surgery FAQ, Code H) distributed to patient if not viewed electronically on [facility] website. Encouraged patient/family to read PAT general education handouts (electronic copies or hard copies) thoroughly and notify PAT staff with any questions or concerns. Patient instructed to bring PAT pass and completed skin prep sheet (if applicable) on the day of procedure. Patient verbalized understanding of all information and priority content.

## 2025-06-17 ENCOUNTER — TELEPHONE (OUTPATIENT)
Dept: FAMILY MEDICINE CLINIC | Facility: CLINIC | Age: 85
End: 2025-06-17
Payer: MEDICARE

## 2025-06-17 RX ORDER — INSULIN GLARGINE-YFGN 100 [IU]/ML
45 INJECTION, SOLUTION SUBCUTANEOUS 2 TIMES DAILY
Qty: 90 ML | Refills: 3 | Status: SHIPPED | OUTPATIENT
Start: 2025-06-17

## 2025-06-17 NOTE — TELEPHONE ENCOUNTER
"Caller: Javier Lerner \"Yfn\"    Relationship: Self    Best call back number:       [phone] (Mobile)     Which medication are you concerned about:       Insulin Glargine-yfgn (Semglee, yfgn,) 100 UNIT/ML solution pen-injector     Who prescribed you this medication:     DR SHAHID    What are your concerns:     PATIENT REQUESTED MEDICATION REFILL BE FORWARDED TO     [place], [place]    TELEPHONE CONTACT:    [phone]    PATIENT STATED [facility] IS OUT OF THE MEDICATION    PATIENT ALSO STATED HE HAS (2) VIALS LEFT OF INSULIN   "

## 2025-06-21 ENCOUNTER — ANESTHESIA EVENT (OUTPATIENT)
Dept: PERIOP | Facility: HOSPITAL | Age: 85
End: 2025-06-21
Payer: MEDICARE

## 2025-06-21 RX ORDER — FAMOTIDINE 10 MG/ML
20 INJECTION, SOLUTION INTRAVENOUS ONCE
Status: CANCELLED | OUTPATIENT
Start: 2025-06-21 | End: 2025-06-21

## 2025-06-23 ENCOUNTER — ANESTHESIA (OUTPATIENT)
Dept: PERIOP | Facility: HOSPITAL | Age: 85
End: 2025-06-23
Payer: MEDICARE

## 2025-06-23 ENCOUNTER — HOSPITAL ENCOUNTER (OUTPATIENT)
Facility: HOSPITAL | Age: 85
Setting detail: HOSPITAL OUTPATIENT SURGERY
Discharge: HOME | End: 2025-06-23
Attending: NEUROLOGICAL SURGERY | Admitting: NEUROLOGICAL SURGERY
Payer: MEDICARE

## 2025-06-23 VITALS
HEART RATE: 75 BPM | RESPIRATION RATE: 16 BRPM | HEIGHT: 71 IN | TEMPERATURE: 98 F | SYSTOLIC BLOOD PRESSURE: 184 MMHG | OXYGEN SATURATION: 95 % | BODY MASS INDEX: 37.8 KG/M2 | DIASTOLIC BLOOD PRESSURE: 86 MMHG | WEIGHT: 270 LBS

## 2025-06-23 DIAGNOSIS — G56.01 RIGHT CARPAL TUNNEL SYNDROME: ICD-10-CM

## 2025-06-23 LAB — GLUCOSE BLDC GLUCOMTR-MCNC: 221 MG/DL (ref 70–130)

## 2025-06-23 PROCEDURE — 25010000002 PROPOFOL 10 MG/ML EMULSION

## 2025-06-23 PROCEDURE — 25810000003 LACTATED RINGERS PER 1000 ML: Performed by: ANESTHESIOLOGY

## 2025-06-23 PROCEDURE — 25010000002 FENTANYL CITRATE (PF) 100 MCG/2ML SOLUTION

## 2025-06-23 PROCEDURE — 25010000002 LIDOCAINE PF 1% 1 % SOLUTION

## 2025-06-23 PROCEDURE — 64721 CARPAL TUNNEL SURGERY: CPT | Performed by: NEUROLOGICAL SURGERY

## 2025-06-23 PROCEDURE — 82948 REAGENT STRIP/BLOOD GLUCOSE: CPT

## 2025-06-23 PROCEDURE — 25010000002 CEFAZOLIN PER 500 MG: Performed by: PHYSICIAN ASSISTANT

## 2025-06-23 PROCEDURE — 25010000002 LIDOCAINE PF 1% 1 % SOLUTION: Performed by: ANESTHESIOLOGY

## 2025-06-23 PROCEDURE — 25010000002 LIDOCAINE 1 % SOLUTION 50 ML VIAL: Performed by: NEUROLOGICAL SURGERY

## 2025-06-23 RX ORDER — HYDROCODONE BITARTRATE AND ACETAMINOPHEN 5; 325 MG/1; MG/1
1 TABLET ORAL ONCE AS NEEDED
Status: DISCONTINUED | OUTPATIENT
Start: 2025-06-23 | End: 2025-06-23 | Stop reason: HOSPADM

## 2025-06-23 RX ORDER — SODIUM CHLORIDE 0.9 % (FLUSH) 0.9 %
10 SYRINGE (ML) INJECTION EVERY 12 HOURS SCHEDULED
Status: DISCONTINUED | OUTPATIENT
Start: 2025-06-23 | End: 2025-06-23 | Stop reason: HOSPADM

## 2025-06-23 RX ORDER — PROMETHAZINE HYDROCHLORIDE 25 MG/1
25 SUPPOSITORY RECTAL ONCE AS NEEDED
Status: DISCONTINUED | OUTPATIENT
Start: 2025-06-23 | End: 2025-06-23 | Stop reason: HOSPADM

## 2025-06-23 RX ORDER — LIDOCAINE HYDROCHLORIDE 10 MG/ML
INJECTION, SOLUTION EPIDURAL; INFILTRATION; INTRACAUDAL; PERINEURAL AS NEEDED
Status: DISCONTINUED | OUTPATIENT
Start: 2025-06-23 | End: 2025-06-23 | Stop reason: SURG

## 2025-06-23 RX ORDER — LIDOCAINE HYDROCHLORIDE 10 MG/ML
0.5 INJECTION, SOLUTION EPIDURAL; INFILTRATION; INTRACAUDAL; PERINEURAL ONCE AS NEEDED
Status: COMPLETED | OUTPATIENT
Start: 2025-06-23 | End: 2025-06-23

## 2025-06-23 RX ORDER — DROPERIDOL 2.5 MG/ML
0.62 INJECTION, SOLUTION INTRAMUSCULAR; INTRAVENOUS
Status: DISCONTINUED | OUTPATIENT
Start: 2025-06-23 | End: 2025-06-23 | Stop reason: HOSPADM

## 2025-06-23 RX ORDER — HYDRALAZINE HYDROCHLORIDE 20 MG/ML
5 INJECTION INTRAMUSCULAR; INTRAVENOUS
Status: DISCONTINUED | OUTPATIENT
Start: 2025-06-23 | End: 2025-06-23 | Stop reason: HOSPADM

## 2025-06-23 RX ORDER — HYDROCODONE BITARTRATE AND ACETAMINOPHEN 7.5; 325 MG/1; MG/1
1 TABLET ORAL EVERY 4 HOURS PRN
Status: DISCONTINUED | OUTPATIENT
Start: 2025-06-23 | End: 2025-06-23 | Stop reason: HOSPADM

## 2025-06-23 RX ORDER — SODIUM CHLORIDE 0.9 % (FLUSH) 0.9 %
3 SYRINGE (ML) INJECTION EVERY 12 HOURS SCHEDULED
Status: DISCONTINUED | OUTPATIENT
Start: 2025-06-23 | End: 2025-06-23 | Stop reason: HOSPADM

## 2025-06-23 RX ORDER — LABETALOL HYDROCHLORIDE 5 MG/ML
5 INJECTION, SOLUTION INTRAVENOUS
Status: DISCONTINUED | OUTPATIENT
Start: 2025-06-23 | End: 2025-06-23 | Stop reason: HOSPADM

## 2025-06-23 RX ORDER — PROPOFOL 10 MG/ML
VIAL (ML) INTRAVENOUS AS NEEDED
Status: DISCONTINUED | OUTPATIENT
Start: 2025-06-23 | End: 2025-06-23 | Stop reason: SURG

## 2025-06-23 RX ORDER — PROMETHAZINE HYDROCHLORIDE 25 MG/1
25 TABLET ORAL ONCE AS NEEDED
Status: DISCONTINUED | OUTPATIENT
Start: 2025-06-23 | End: 2025-06-23 | Stop reason: HOSPADM

## 2025-06-23 RX ORDER — SODIUM CHLORIDE, SODIUM LACTATE, POTASSIUM CHLORIDE, CALCIUM CHLORIDE 600; 310; 30; 20 MG/100ML; MG/100ML; MG/100ML; MG/100ML
9 INJECTION, SOLUTION INTRAVENOUS CONTINUOUS
Status: DISCONTINUED | OUTPATIENT
Start: 2025-06-23 | End: 2025-06-23 | Stop reason: HOSPADM

## 2025-06-23 RX ORDER — NALOXONE HCL 0.4 MG/ML
0.4 VIAL (ML) INJECTION AS NEEDED
Status: DISCONTINUED | OUTPATIENT
Start: 2025-06-23 | End: 2025-06-23 | Stop reason: HOSPADM

## 2025-06-23 RX ORDER — SODIUM CHLORIDE 0.9 % (FLUSH) 0.9 %
10 SYRINGE (ML) INJECTION AS NEEDED
Status: DISCONTINUED | OUTPATIENT
Start: 2025-06-23 | End: 2025-06-23 | Stop reason: HOSPADM

## 2025-06-23 RX ORDER — MIDAZOLAM HYDROCHLORIDE 1 MG/ML
0.5 INJECTION, SOLUTION INTRAMUSCULAR; INTRAVENOUS
Status: DISCONTINUED | OUTPATIENT
Start: 2025-06-23 | End: 2025-06-23 | Stop reason: HOSPADM

## 2025-06-23 RX ORDER — BUPIVACAINE HCL/0.9 % NACL/PF 0.125 %
PLASTIC BAG, INJECTION (ML) EPIDURAL AS NEEDED
Status: DISCONTINUED | OUTPATIENT
Start: 2025-06-23 | End: 2025-06-23 | Stop reason: SURG

## 2025-06-23 RX ORDER — FENTANYL CITRATE 50 UG/ML
INJECTION, SOLUTION INTRAMUSCULAR; INTRAVENOUS AS NEEDED
Status: DISCONTINUED | OUTPATIENT
Start: 2025-06-23 | End: 2025-06-23 | Stop reason: SURG

## 2025-06-23 RX ORDER — SODIUM CHLORIDE 0.9 % (FLUSH) 0.9 %
3-10 SYRINGE (ML) INJECTION AS NEEDED
Status: DISCONTINUED | OUTPATIENT
Start: 2025-06-23 | End: 2025-06-23 | Stop reason: HOSPADM

## 2025-06-23 RX ORDER — FENTANYL CITRATE 50 UG/ML
50 INJECTION, SOLUTION INTRAMUSCULAR; INTRAVENOUS
Status: DISCONTINUED | OUTPATIENT
Start: 2025-06-23 | End: 2025-06-23 | Stop reason: HOSPADM

## 2025-06-23 RX ORDER — MAGNESIUM HYDROXIDE 1200 MG/15ML
LIQUID ORAL AS NEEDED
Status: DISCONTINUED | OUTPATIENT
Start: 2025-06-23 | End: 2025-06-23 | Stop reason: HOSPADM

## 2025-06-23 RX ORDER — HYDROMORPHONE HYDROCHLORIDE 1 MG/ML
0.5 INJECTION, SOLUTION INTRAMUSCULAR; INTRAVENOUS; SUBCUTANEOUS
Status: DISCONTINUED | OUTPATIENT
Start: 2025-06-23 | End: 2025-06-23 | Stop reason: HOSPADM

## 2025-06-23 RX ORDER — OXYCODONE AND ACETAMINOPHEN 5; 325 MG/1; MG/1
1 TABLET ORAL 3 TIMES DAILY PRN
Qty: 12 TABLET | Refills: 0 | Status: SHIPPED | OUTPATIENT
Start: 2025-06-23 | End: 2025-07-03

## 2025-06-23 RX ORDER — DROPERIDOL 2.5 MG/ML
0.62 INJECTION, SOLUTION INTRAMUSCULAR; INTRAVENOUS ONCE AS NEEDED
Status: DISCONTINUED | OUTPATIENT
Start: 2025-06-23 | End: 2025-06-23 | Stop reason: HOSPADM

## 2025-06-23 RX ORDER — SODIUM CHLORIDE, SODIUM LACTATE, POTASSIUM CHLORIDE, CALCIUM CHLORIDE 600; 310; 30; 20 MG/100ML; MG/100ML; MG/100ML; MG/100ML
9 INJECTION, SOLUTION INTRAVENOUS CONTINUOUS
Status: DISCONTINUED | OUTPATIENT
Start: 2025-06-24 | End: 2025-06-23 | Stop reason: HOSPADM

## 2025-06-23 RX ORDER — FAMOTIDINE 20 MG/1
20 TABLET, FILM COATED ORAL ONCE
Status: COMPLETED | OUTPATIENT
Start: 2025-06-23 | End: 2025-06-23

## 2025-06-23 RX ORDER — IPRATROPIUM BROMIDE AND ALBUTEROL SULFATE 2.5; .5 MG/3ML; MG/3ML
3 SOLUTION RESPIRATORY (INHALATION) ONCE AS NEEDED
Status: DISCONTINUED | OUTPATIENT
Start: 2025-06-23 | End: 2025-06-23 | Stop reason: HOSPADM

## 2025-06-23 RX ORDER — ONDANSETRON 2 MG/ML
4 INJECTION INTRAMUSCULAR; INTRAVENOUS ONCE AS NEEDED
Status: DISCONTINUED | OUTPATIENT
Start: 2025-06-23 | End: 2025-06-23 | Stop reason: HOSPADM

## 2025-06-23 RX ORDER — SODIUM CHLORIDE 9 MG/ML
9 INJECTION, SOLUTION INTRAVENOUS AS NEEDED
Status: DISCONTINUED | OUTPATIENT
Start: 2025-06-23 | End: 2025-06-23 | Stop reason: HOSPADM

## 2025-06-23 RX ADMIN — FAMOTIDINE 20 MG: 20 TABLET, FILM COATED ORAL at 06:33

## 2025-06-23 RX ADMIN — SODIUM CHLORIDE, POTASSIUM CHLORIDE, SODIUM LACTATE AND CALCIUM CHLORIDE 9 ML/HR: 600; 310; 30; 20 INJECTION, SOLUTION INTRAVENOUS at 06:25

## 2025-06-23 RX ADMIN — Medication 200 MCG: at 07:15

## 2025-06-23 RX ADMIN — LIDOCAINE HYDROCHLORIDE 100 MG: 10 INJECTION, SOLUTION EPIDURAL; INFILTRATION; INTRACAUDAL; PERINEURAL at 06:55

## 2025-06-23 RX ADMIN — SODIUM CHLORIDE 2 G: 900 INJECTION INTRAVENOUS at 07:00

## 2025-06-23 RX ADMIN — PROPOFOL 50 MG: 10 INJECTION, EMULSION INTRAVENOUS at 06:55

## 2025-06-23 RX ADMIN — FENTANYL CITRATE 25 MCG: 50 INJECTION, SOLUTION INTRAMUSCULAR; INTRAVENOUS at 06:58

## 2025-06-23 RX ADMIN — Medication 100 MCG: at 07:09

## 2025-06-23 RX ADMIN — PROPOFOL 100 MCG/KG/MIN: 10 INJECTION, EMULSION INTRAVENOUS at 06:56

## 2025-06-23 RX ADMIN — FENTANYL CITRATE 25 MCG: 50 INJECTION, SOLUTION INTRAMUSCULAR; INTRAVENOUS at 06:55

## 2025-06-23 RX ADMIN — LIDOCAINE HYDROCHLORIDE 0.5 ML: 10 INJECTION, SOLUTION EPIDURAL; INFILTRATION; INTRACAUDAL; PERINEURAL at 06:25

## 2025-06-23 NOTE — ANESTHESIA POSTPROCEDURE EVALUATION
Patient: Javier Lerner    Procedure Summary       Date: 06/23/25 Room / Location:  [facility] [address] [place] [address] [facility]    Anesthesia Start: 0651 Anesthesia Stop: 0729    Procedure: CARPAL TUNNEL RELEASE (Right: Wrist) Diagnosis:       Right carpal tunnel syndrome      (Right carpal tunnel syndrome [G56.01])    Surgeons: Rio Iraheta MD Provider: Gee Veliz MD    Anesthesia Type: MAC ASA Status: 3            Anesthesia Type: MAC    Vitals  Vitals Value Taken Time   /75 06/23/25 07:28   Temp 97 °F (36.1 °C) 06/23/25 07:28   Pulse 78 06/23/25 07:28   Resp     SpO2 94 % 06/23/25 07:28           Post Anesthesia Care and Evaluation    Patient location during evaluation: PACU  Patient participation: complete - patient participated  Level of consciousness: awake and alert  Pain management: adequate    Airway patency: patent  Anesthetic complications: No anesthetic complications  PONV Status: none  Cardiovascular status: hemodynamically stable and acceptable  Respiratory status: nonlabored ventilation, acceptable and nasal cannula  Hydration status: acceptable

## 2025-06-23 NOTE — LETTER
[facility]    Patient: Javier Lerner  : 1940      Dear Aman,    I just completed the right carpal tunnel release on Mr. Lerner.  Thus far all has gone well.  He will come back to our office in about 10 days for wound check and suture removal.      With kindest regards,    Rio Iraheta MD  25  07:33 EDT

## 2025-06-23 NOTE — H&P
Pre-Op H&P  Javier Lerner  [id number]  1940      Chief complaint: Right hand numbness      Subjective:  Patient is a 84 y.o.male presents for scheduled surgery by Dr. Iraheta. He anticipates a CARPAL TUNNEL RELEASE  today. He reports worsening right hand numbness and difficulty with  over the past couple years. He sleeps with a wrist splint on the right hand which helps with nocturnal awakenings. He describes sensory alteration in the median nerve distribution. No radicular spread of symptoms.       Review of Systems:  Constitutional-- No fever, chills or sweats. No fatigue.  CV-- No chest pain, palpitation or syncope. +HTN, HLD, CAD, PPM, CHF, afib  +cardiac clearance   Resp-- No cough, hemoptysis. +SOB, OMAIRA on bipap  Skin--No rashes or lesions      Allergies:   Allergies   Allergen Reactions    Penicillins Other (See Comments)     WELTS ON HANDS AND TURN PURPLE    Tetracyclines & Related Other (See Comments)     Blisters on skin         Home Meds:  Medications Prior to Admission   Medication Sig Dispense Refill Last Dose/Taking    bumetanide (BUMEX) 1 MG tablet TAKE 1 TABLET DAILY 90 tablet 3 6/22/2025    Chlorhexidine Gluconate 4 % solution Apply 1 Application topically to the appropriate area as directed Daily. Shower with hibiclens solution for 5 days prior to surgery. Bring prescription to [facility] pharmacy to be filled if possible. 236 mL 0 6/23/2025 Morning    Eliquis 2.5 MG tablet tablet TAKE 1 TABLET TWICE A  tablet 3 6/19/2025    fenofibrate (TRICOR) 145 MG tablet TAKE 1 TABLET DAILY 90 tablet 3 6/22/2025    finasteride (PROSCAR) 5 MG tablet TAKE 1 TABLET DAILY 90 tablet 3 6/22/2025    hydrALAZINE (APRESOLINE) 100 MG tablet Take 1 tablet by mouth 2 (Two) Times a Day. 180 tablet 1 6/22/2025    Insulin Glargine-yfgn (Semglee, yfgn,) 100 UNIT/ML solution pen-injector Inject 45 Units under the skin into the appropriate area as directed 2 (Two) Times a Day. 90 mL 3 6/22/2025     Insulin Lispro (HumaLOG) 100 UNIT/ML injection Inject 15 Units under the skin into the appropriate area as directed 3 (Three) Times a Day Before Meals. (Patient taking differently: Inject 15 Units under the skin into the appropriate area as directed 3 (Three) Times a Day Before Meals. Sliding scale) 42 mL 3 6/22/2025    Jardiance 10 MG tablet tablet TAKE 1 TABLET DAILY 90 tablet 3 6/22/2025    metoprolol tartrate (LOPRESSOR) 50 MG tablet TAKE 1 TABLET TWICE A  tablet 3 6/22/2025 at 10:00 AM    multivitamins-minerals (PRESERVISION AREDS 2) capsule capsule Take 1 capsule by mouth Daily.   6/22/2025    nitroglycerin (NITROSTAT) 0.4 MG SL tablet Place 1 tablet under the tongue Every 5 (Five) Minutes As Needed for Chest Pain. Take no more than 3 doses in 15 minutes. 100 tablet 1 Past Week    sennosides-docusate (senna-docusate sodium) 8.6-50 MG per tablet Take 1 tablet by mouth 2 (Two) Times a Day. 180 tablet 1 Past Month    simethicone (MYLICON) 80 MG chewable tablet CHEW 1 TABLET THREE TIMES A  tablet 2 6/22/2025    simvastatin (ZOCOR) 20 MG tablet TAKE 1 TABLET EVERY NIGHT 90 tablet 3 Past Week    triamcinolone (KENALOG) 0.1 % cream Apply 1 Application topically to the appropriate area as directed 2 (Two) Times a Day. (Patient taking differently: Apply 1 Application topically to the appropriate area as directed As Needed.) 135 g 3 Past Week    Continuous Blood Gluc  (FreeStyle Hernan 14 Day [place]) device 1 application As Needed (test glucose). Dx: E11.9 1 each 24     Continuous Blood Gluc Sensor (FreeStyle Hernan 14 Day Sensor) misc 1 application As Needed (test glucose). Dx: E11.9 1 each 24     Diclofenac Sodium (VOLTAREN) 1 % gel gel Apply 4 g topically to the appropriate area as directed As Needed.   More than a month    glucose blood test strip OneTouch Verio test strips   USE TO TEST BLOOD GLUCOSE 3 TIMES DAILY e11.65       Insulin Pen Needle (B-D UF III MINI PEN NEEDLES) 31G X 5 MM misc Use  with insulin as directed, 3 times a day 270 each 3     meclizine (ANTIVERT) 25 MG tablet Take 1 tablet by mouth 3 (Three) Times a Day As Needed for Dizziness. 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 1997, FSBS 1 X DAY    Enlarged prostate     Erectile dysfunction     Years ago    GERD (gastroesophageal reflux disease)     Heartburn     HL (hearing loss)     Hyperlipidemia     Hypertension     Liver disease     Myocardial infarction     Obesity, Class II, BMI 35-39.9, with comorbidity     OMAIRA (obstructive sleep apnea)     Pacemaker     Pancreatitis     HISTORY OF    Pulmonary HTN 10/16/2023    Renal insufficiency     See nephrology dr koenig    Type 2 diabetes mellitus 1996    Wears glasses      PSH:    Past Surgical History:   Procedure Laterality Date    ANKLE SURGERY Right     APPENDECTOMY      CARDIAC ELECTROPHYSIOLOGY PROCEDURE N/A 01/05/2017    Procedure: Pacemaker DC new;  Surgeon: Bryson Hodge MD;  Location: [facility][1] INVASIVE LOCATION;  Service:     CATARACT EXTRACTION, BILATERAL      CHOLECYSTECTOMY      COLONOSCOPY      HERNIA REPAIR      UMBILICAL     INSERT / REPLACE / REMOVE PACEMAKER      PACEMAKER IMPLANTATION  01/05/2017    PER DR. HODGE       Immunization History:  Influenza: No  Pneumococcal: No  Tetanus: Unknown     Social History:   Tobacco:   Social History     Tobacco Use   Smoking Status Former    Current packs/day: 0.00    Average packs/day: 0.5 packs/day for 60.0 years (30.0 ttl pk-yrs)    Types: Cigarettes, Pipe    Start date: 1/4/1954    Quit date: 1/4/2014    Years since quitting:  "11.4    Passive exposure: Never   Smokeless Tobacco Never      Alcohol:     Social History     Substance and Sexual Activity   Alcohol Use Yes    Alcohol/week: 2.0 standard drinks of alcohol    Types: 2 Glasses of wine per week    Comment: Occasionally         Physical Exam:/79 (BP Location: Right arm, Patient Position: Sitting)   Pulse 76   Temp 97.7 °F (36.5 °C) (Temporal)   Resp 18   Ht 179.1 cm (70.5\")   Wt 122 kg (270 lb)   SpO2 94%   BMI 38.19 kg/m²       General Appearance:    Alert, cooperative, no distress, appears stated age   Head:    Normocephalic, without obvious abnormality, atraumatic   Lungs:     Clear to auscultation bilaterally, respirations unlabored    Heart:   Regular rate and rhythm, S1 and S2 normal    Abdomen:    Soft without tenderness   Extremities:   Extremities normal, atraumatic, no cyanosis or edema   Skin:   Skin color, texture, turgor normal, no rashes or lesions   Neurologic:   Grossly intact     Results Review:     LABS:  Lab Results   Component Value Date    WBC 8.13 06/16/2025    HGB 16.1 06/16/2025    HCT 49.1 06/16/2025    MCV 95.0 06/16/2025     06/16/2025    NEUTROABS 7.97 (H) 12/15/2023    GLUCOSE 202 (H) 06/16/2025    BUN 21.7 06/16/2025    CREATININE 1.34 (H) 06/16/2025    EGFRIFNONA 42 (L) 01/04/2017     06/16/2025    K 4.1 06/16/2025     06/16/2025    CO2 25.0 06/16/2025    MG 1.7 09/07/2023    PHOS 3.2 05/13/2015    CALCIUM 9.0 06/16/2025    ALBUMIN 4.0 06/16/2025    AST 17 06/16/2025    ALT 10 06/16/2025    BILITOT 0.5 06/16/2025       RADIOLOGY:  Imaging Results (Last 72 Hours)       ** No results found for the last 72 hours. **            I reviewed the patient's new clinical results.    Cancer Staging (if applicable)  Cancer Patient: __ yes __no __unknown; If yes, clinical stage T:__ N:__M:__, stage group or __N/A      Impression: Right carpal tunnel syndrome       Plan: CARPAL TUNNEL RELEASE -right      BOB Rodriguez "   6/23/2025   06:34 EDT

## 2025-06-23 NOTE — OP NOTE
NEUROSURGICAL OPERATIVE NOTE        PREOPERATIVE DIAGNOSIS:    Right carpal tunnel syndrome      POSTOPERATIVE DIAGNOSIS:  Same      PROCEDURE:  Right carpal tunnel release      SURGEON:  Rio Iraheta M.D.      ANESTHESIA: Local/MAC      ESTIMATED BLOOD LOSS: Minimal      SPECIMEN: None      DRAINS: None      COMPLICATIONS:  None      CLINICAL NOTE:  The patient is an 84 old gentleman with bilateral upper extremity sensory alteration and pain.  Studies demonstrate significant bilateral carpal tunnel syndrome worse on the right.  Given his ongoing symptoms despite the use of wrist splints he presents at this time for right carpal tunnel release.  The nature of the procedure as well as the potential risks, complications, limitations, and alternatives to the procedure were discussed at length with the patient and the patient has agreed to proceed with surgery.      TECHNICAL NOTE:  The patient was brought to the operating room and placed on the operating room table in the supine position.  Sedation was provided.  Tourniquet was placed about his right upper extremity proximally.  Right upper extremity from the elbow to the fingertips was prepared and draped in the usual fashion.  An incision was marked in the proximal palm of the right hand in line with the 4th digit.  This was infiltrated with 1% lidocaine with sodium bicarbonate.  Tourniquet was inflated.  Incision was fashioned.  Soft tissues were divided to provide exposure to a thickened transverse carpal ligament which was divided sharply with scalpel and tenotomy scissors.  Dissection was conducted proximally and then distally until palmar fat was identified.  Good decompression was achieved.  The tourniquet was lowered.  Modest bleeding points were controlled with bipolar cautery.  The wound was washed out with saline solution and then closed in an interrupted vertical mattress fashion with 4-0 nylon suture.  Sterile dressing was applied.  He did receive  preoperative antibiotics.  He was then taken to the recovery room in satisfactory condition.  There were no overt intraoperative complications.             Rio Iraheta M.D.

## 2025-06-23 NOTE — ANESTHESIA PREPROCEDURE EVALUATION
Anesthesia Evaluation     Patient summary reviewed and Nursing notes reviewed   NPO Solid Status: > 8 hours  NPO Liquid Status: > 8 hours           Airway   Mallampati: III  TM distance: >3 FB  Neck ROM: full  No difficulty expected  Dental - normal exam     Pulmonary - normal exam   Cardiovascular   Exercise tolerance: poor (<4 METS)    Rhythm: regular  Rate: normal        Neuro/Psych  GI/Hepatic/Renal/Endo      Musculoskeletal     Abdominal    Substance History      OB/GYN          Other                    Anesthesia Plan    ASA 3     MAC     intravenous induction     Anesthetic plan, risks, benefits, and alternatives have been provided, discussed and informed consent has been obtained with: patient.    CODE STATUS:

## 2025-06-23 NOTE — NURSING NOTE
..Caregiver Telephone Number    Phone Number for Ride/Caregiver: TYLER ARCHER [phone]

## 2025-07-03 ENCOUNTER — OFFICE VISIT (OUTPATIENT)
Dept: NEUROSURGERY | Facility: CLINIC | Age: 85
End: 2025-07-03
Payer: MEDICARE

## 2025-07-03 VITALS — BODY MASS INDEX: 37.83 KG/M2 | TEMPERATURE: 97.8 F | WEIGHT: 270.2 LBS | HEIGHT: 71 IN

## 2025-07-03 DIAGNOSIS — G56.01 RIGHT CARPAL TUNNEL SYNDROME: Primary | ICD-10-CM

## 2025-07-03 RX ORDER — INSULIN LISPRO 100 [IU]/ML
INJECTION, SOLUTION INTRAVENOUS; SUBCUTANEOUS
COMMUNITY
Start: 2025-04-04

## 2025-07-03 NOTE — PROGRESS NOTES
Subjective     Chief Complaint: right hand pain with sensory alteration                                  S/p right carpal tunnel release     Patient ID: Javier Lerner is a 84 y.o. male is here today for follow-up.    Post-op      History of Present Illness  Mr Rand is an 84 old gentleman with bilateral upper extremity sensory alteration and pain. Studies demonstrated significant bilateral carpal tunnel syndrome worse on the right. Given his ongoing symptoms despite the use of wrist splints he presented on 25 for right carpal tunnel release.  He is here today for suture removal.  He states that he has had some minor decrease in the numbness and tingling in his hand.  He notes that his fingertips remain quite numb and have not changed.  His incisional pain has been minimal.  He denies any fevers chills or drainage from his incision    The following portions of the patient's history were reviewed and updated as appropriate: allergies, current medications, past family history, past medical history, past social history, past surgical history and problem list.    Family history:   Family History   Problem Relation Age of Onset    Heart disease Mother     Diabetes Father     Hypertension Father     Heart disease Father     Sleep apnea Father        Social history:   Social History     Socioeconomic History    Marital status:    Tobacco Use    Smoking status: Former     Current packs/day: 0.00     Average packs/day: 0.5 packs/day for 60.0 years (30.0 ttl pk-yrs)     Types: Cigarettes, Pipe     Start date: 1954     Quit date: 2014     Years since quittin.5     Passive exposure: Never    Smokeless tobacco: Never   Vaping Use    Vaping status: Never Used   Substance and Sexual Activity    Alcohol use: Yes     Alcohol/week: 2.0 standard drinks of alcohol     Types: 2 Glasses of wine per week     Comment: Occasionally    Drug use: Never    Sexual activity: Not Currently     Partners: Female      Birth control/protection: Birth control pill       Review of Systems   Constitutional:  Positive for activity change. Negative for appetite change, chills, diaphoresis, fatigue, fever and unexpected weight change.   HENT:  Negative for congestion, dental problem, drooling, ear discharge, ear pain, facial swelling, hearing loss, mouth sores, nosebleeds, postnasal drip, rhinorrhea, sinus pressure, sinus pain, sneezing, sore throat, tinnitus, trouble swallowing and voice change.    Eyes:  Negative for photophobia, pain, discharge, redness, itching and visual disturbance.   Respiratory:  Negative for apnea, cough, choking, chest tightness, shortness of breath, wheezing and stridor.    Cardiovascular:  Negative for chest pain, palpitations and leg swelling.   Gastrointestinal:  Negative for abdominal distention, abdominal pain, anal bleeding, blood in stool, constipation, diarrhea, nausea, rectal pain and vomiting.   Endocrine: Negative for cold intolerance, heat intolerance, polydipsia, polyphagia and polyuria.   Genitourinary:  Negative for decreased urine volume, difficulty urinating, dysuria, enuresis, flank pain, frequency, genital sores, hematuria, penile discharge, penile pain, penile swelling, scrotal swelling, testicular pain and urgency.   Musculoskeletal:  Negative for arthralgias, back pain, gait problem, joint swelling, myalgias, neck pain and neck stiffness.   Skin:  Negative for color change, pallor, rash and wound.   Allergic/Immunologic: Negative for environmental allergies, food allergies and immunocompromised state.   Neurological:  Positive for weakness and numbness. Negative for dizziness, tremors, seizures, syncope, facial asymmetry, speech difficulty, light-headedness and headaches.   Hematological:  Negative for adenopathy. Does not bruise/bleed easily.   Psychiatric/Behavioral:  Negative for agitation, behavioral problems, confusion, decreased concentration, dysphoric mood, hallucinations,  "self-injury, sleep disturbance and suicidal ideas. The patient is not nervous/anxious and is not hyperactive.        Objective   Temperature 97.8 °F (36.6 °C), temperature source Infrared, height 179.1 cm (70.5\"), weight 123 kg (270 lb 3.2 oz).  Body mass index is 38.22 kg/m².    Physical Exam  Constitutional:       Appearance: Normal appearance.   Eyes:      Pupils: Pupils are equal, round, and reactive to light.   Cardiovascular:      Pulses: Normal pulses.   ____Pulmonary:      Effort: Pulmonary effort is normal.      Breath sounds: Normal breath sounds.   Musculoskeletal:      Comments:  strength mildly decreased on the right compared to the left.  Subjective numbness of the tips of his fingers.  Skin:     Comments: Incision clean, dry, and intact with no swelling, tenderness, or erythema.  His incision was cleaned well with alcohol swabs and the sutures were removed.  There was no bleeding or separation of the incision.    _Neurological:      General: No focal deficit present.      Mental Status:   alert and oriented to person, place, and time.   Psychiatric:         Mood and Affect: Mood normal.         Behavior: Behavior normal.    Assessment & Plan   Mr Lerner has had a decrease in the burning pain in his hand and some mild reduction in the numbness of his palm and fingers other than the tips which remain numb.  He was cautioned that sometimes this does not completely resolve but even when it does it takes quite a long time to improve.  He will do some hand exercises and incision massage.  He does not feel that he needs physical therapy at this time but we will be happy to prescribe it should he change his mind.  We will plan to follow-up with him on an as-needed basis going forward.  He will call the office if he has any incisional drainage or redness    Diagnoses and all orders for this visit:    1. Right carpal tunnel syndrome (Primary)      Return if symptoms worsen or fail to improve.       This " document signed by Karyna Wetzel PA-C  July 3, 2025 17:39 EDT

## 2025-07-08 RX ORDER — METOPROLOL TARTRATE 50 MG
50 TABLET ORAL 2 TIMES DAILY
Qty: 180 TABLET | Refills: 3 | Status: SHIPPED | OUTPATIENT
Start: 2025-07-08

## 2025-07-08 RX ORDER — APIXABAN 2.5 MG/1
2.5 TABLET, FILM COATED ORAL 2 TIMES DAILY
Qty: 180 TABLET | Refills: 3 | Status: SHIPPED | OUTPATIENT
Start: 2025-07-08

## 2025-07-18 LAB
MDC_IDC_MSMT_BATTERY_REMAINING_PERCENTAGE: 35 %
MDC_IDC_MSMT_BATTERY_STATUS: NORMAL
MDC_IDC_MSMT_LEADCHNL_RA_DTM: NORMAL
MDC_IDC_MSMT_LEADCHNL_RA_IMPEDANCE_VALUE: 468
MDC_IDC_MSMT_LEADCHNL_RA_SENSING_INTR_AMPL: 2.2
MDC_IDC_MSMT_LEADCHNL_RV_DTM: NORMAL
MDC_IDC_MSMT_LEADCHNL_RV_IMPEDANCE_VALUE: 468
MDC_IDC_MSMT_LEADCHNL_RV_PACING_THRESHOLD_AMPLITUDE: 1.1
MDC_IDC_MSMT_LEADCHNL_RV_PACING_THRESHOLD_POLARITY: NORMAL
MDC_IDC_MSMT_LEADCHNL_RV_PACING_THRESHOLD_PULSEWIDTH: 0.4
MDC_IDC_MSMT_LEADCHNL_RV_SENSING_INTR_AMPL: 10.7
MDC_IDC_PG_IMPLANT_DTM: NORMAL
MDC_IDC_PG_MFG: NORMAL
MDC_IDC_PG_MODEL: NORMAL
MDC_IDC_PG_SERIAL: NORMAL
MDC_IDC_PG_TYPE: NORMAL
MDC_IDC_SESS_DTM: NORMAL
MDC_IDC_SESS_TYPE: NORMAL
MDC_IDC_SET_BRADY_AT_MODE_SWITCH_RATE: 160
MDC_IDC_SET_BRADY_LOWRATE: 70
MDC_IDC_SET_BRADY_MAX_SENSOR_RATE: 100
MDC_IDC_SET_BRADY_MAX_TRACKING_RATE: 130
MDC_IDC_SET_BRADY_MODE: NORMAL
MDC_IDC_SET_BRADY_PAV_DELAY: 245
MDC_IDC_SET_BRADY_SAV_DELAY: 245
MDC_IDC_SET_LEADCHNL_RA_PACING_AMPLITUDE: 2.4
MDC_IDC_SET_LEADCHNL_RA_PACING_POLARITY: NORMAL
MDC_IDC_SET_LEADCHNL_RA_PACING_PULSEWIDTH: 0.4
MDC_IDC_SET_LEADCHNL_RA_SENSING_POLARITY: NORMAL
MDC_IDC_SET_LEADCHNL_RV_PACING_AMPLITUDE: 1.6
MDC_IDC_SET_LEADCHNL_RV_PACING_POLARITY: NORMAL
MDC_IDC_SET_LEADCHNL_RV_PACING_PULSEWIDTH: 0.4
MDC_IDC_SET_LEADCHNL_RV_SENSING_POLARITY: NORMAL
MDC_IDC_STAT_AT_BURDEN_PERCENT: 0
MDC_IDC_STAT_BRADY_RA_PERCENT_PACED: 97
MDC_IDC_STAT_BRADY_RV_PERCENT_PACED: 0

## 2025-07-28 RX ORDER — HYDRALAZINE HYDROCHLORIDE 100 MG/1
100 TABLET, FILM COATED ORAL 2 TIMES DAILY
Qty: 180 TABLET | Refills: 3 | Status: SHIPPED | OUTPATIENT
Start: 2025-07-28

## 2025-08-12 RX ORDER — SIMVASTATIN 20 MG
20 TABLET ORAL NIGHTLY
Qty: 90 TABLET | Refills: 3 | Status: SHIPPED | OUTPATIENT
Start: 2025-08-12

## 2025-08-15 ENCOUNTER — OFFICE VISIT (OUTPATIENT)
Dept: SLEEP MEDICINE | Age: 85
End: 2025-08-15
Payer: MEDICARE

## 2025-08-15 VITALS
TEMPERATURE: 96.6 F | DIASTOLIC BLOOD PRESSURE: 70 MMHG | BODY MASS INDEX: 37.38 KG/M2 | OXYGEN SATURATION: 96 % | SYSTOLIC BLOOD PRESSURE: 140 MMHG | WEIGHT: 267 LBS | HEIGHT: 71 IN | HEART RATE: 74 BPM

## 2025-08-15 DIAGNOSIS — G47.33 OBSTRUCTIVE SLEEP APNEA, ADULT: Primary | ICD-10-CM

## 2025-08-15 DIAGNOSIS — E66.01 MORBID (SEVERE) OBESITY DUE TO EXCESS CALORIES: ICD-10-CM

## 2025-08-26 RX ORDER — FEXOFENADINE HCL AND PSEUDOEPHEDRINE HCL 180; 240 MG/1; MG/1
1 TABLET, EXTENDED RELEASE ORAL DAILY
Qty: 15 TABLET | Refills: 0 | Status: SHIPPED | OUTPATIENT
Start: 2025-08-26

## 2025-08-26 RX ORDER — LEVOFLOXACIN 500 MG/1
500 TABLET, FILM COATED ORAL DAILY
Qty: 10 TABLET | Refills: 0 | Status: SHIPPED | OUTPATIENT
Start: 2025-08-26

## (undated) DEVICE — SURGUARD3 SAFETY HYPODERMIC NEEDLE: Brand: SURGUARD3

## (undated) DEVICE — DISPOSABLE TOURNIQUET CUFF SINGLE BLADDER, DUAL PORT AND QUICK CONNECT CONNECTOR: Brand: COLOR CUFF

## (undated) DEVICE — PK EXTREM UPPR 10

## (undated) DEVICE — GLV SURG PREMIERPRO MIC LTX PF SZ7.5 BRN

## (undated) DEVICE — DISPOSABLE BIPOLAR FORCEPS 5 1/2" (14CM) SEMKIN, 0.7MM TIP AND 12 FT. (3.6M) CABLE: Brand: KIRWAN

## (undated) DEVICE — STOCKINETTE,DOUBLE PLY,4X48,STERILE: Brand: MEDLINE

## (undated) DEVICE — PAD,NON-ADHERENT,3X8,STERILE,LF,1/PK: Brand: MEDLINE

## (undated) DEVICE — SUT ETHLN 4/0 PS2 18IN 1667H

## (undated) DEVICE — SYR CONTRL LUERLOK 10CC

## (undated) DEVICE — 3M™ MEDIPORE™ H SOFT CLOTH SURGICAL TAPE, 2863, 3 IN X 10 YD, 12/CASE: Brand: 3M™ MEDIPORE™